# Patient Record
Sex: FEMALE | Race: WHITE | NOT HISPANIC OR LATINO | Employment: FULL TIME | URBAN - METROPOLITAN AREA
[De-identification: names, ages, dates, MRNs, and addresses within clinical notes are randomized per-mention and may not be internally consistent; named-entity substitution may affect disease eponyms.]

---

## 2017-01-04 ENCOUNTER — TRANSCRIBE ORDERS (OUTPATIENT)
Dept: ADMINISTRATIVE | Facility: HOSPITAL | Age: 22
End: 2017-01-04

## 2017-01-04 DIAGNOSIS — G47.00 INSOMNIA WITH SLEEP APNEA, UNSPECIFIED: Primary | ICD-10-CM

## 2017-01-04 DIAGNOSIS — G47.30 INSOMNIA WITH SLEEP APNEA, UNSPECIFIED: Primary | ICD-10-CM

## 2017-02-14 ENCOUNTER — ALLSCRIPTS OFFICE VISIT (OUTPATIENT)
Dept: OTHER | Facility: OTHER | Age: 22
End: 2017-02-14

## 2017-02-17 LAB
HERPES SIMPLEX TYPING (HISTORICAL): DETECTED
HERPES SIMPLEX TYPING (HISTORICAL): NOT DETECTED

## 2017-02-24 ENCOUNTER — ALLSCRIPTS OFFICE VISIT (OUTPATIENT)
Dept: OTHER | Facility: OTHER | Age: 22
End: 2017-02-24

## 2017-04-28 ENCOUNTER — APPOINTMENT (OUTPATIENT)
Dept: LAB | Age: 22
End: 2017-04-28
Attending: PREVENTIVE MEDICINE
Payer: COMMERCIAL

## 2017-04-28 ENCOUNTER — TRANSCRIBE ORDERS (OUTPATIENT)
Dept: ADMINISTRATIVE | Age: 22
End: 2017-04-28

## 2017-04-28 DIAGNOSIS — Z02.1 PRE-EMPLOYMENT HEALTH SCREENING EXAMINATION: Primary | ICD-10-CM

## 2017-04-28 DIAGNOSIS — Z02.1 PRE-EMPLOYMENT HEALTH SCREENING EXAMINATION: ICD-10-CM

## 2017-04-28 PROCEDURE — 86787 VARICELLA-ZOSTER ANTIBODY: CPT

## 2017-04-28 PROCEDURE — 86765 RUBEOLA ANTIBODY: CPT

## 2017-04-28 PROCEDURE — 36415 COLL VENOUS BLD VENIPUNCTURE: CPT

## 2017-04-28 PROCEDURE — 86480 TB TEST CELL IMMUN MEASURE: CPT

## 2017-04-28 PROCEDURE — 86762 RUBELLA ANTIBODY: CPT

## 2017-04-28 PROCEDURE — 86735 MUMPS ANTIBODY: CPT

## 2017-04-29 LAB — RUBV IGG SERPL IA-ACNC: 163.1 IU/ML

## 2017-04-30 LAB
ANNOTATION COMMENT IMP: NORMAL
GAMMA INTERFERON BACKGROUND BLD IA-ACNC: 0.03 IU/ML
M TB IFN-G BLD-IMP: NEGATIVE
M TB IFN-G CD4+ BCKGRND COR BLD-ACNC: <0.01 IU/ML
M TB IFN-G CD4+ T-CELLS BLD-ACNC: 0.02 IU/ML
MITOGEN IGNF BLD-ACNC: 8.6 IU/ML
QUANTIFERON-TB GOLD IN TUBE: NORMAL
SERVICE CMNT-IMP: NORMAL

## 2017-05-02 LAB
MEV IGG SER QL: NORMAL
MUV IGG SER QL: NORMAL
VZV IGG SER IA-ACNC: NORMAL

## 2017-05-03 ENCOUNTER — APPOINTMENT (EMERGENCY)
Dept: RADIOLOGY | Facility: HOSPITAL | Age: 22
End: 2017-05-03
Payer: COMMERCIAL

## 2017-05-03 ENCOUNTER — HOSPITAL ENCOUNTER (EMERGENCY)
Facility: HOSPITAL | Age: 22
Discharge: HOME | End: 2017-05-03
Attending: EMERGENCY MEDICINE | Admitting: EMERGENCY MEDICINE
Payer: COMMERCIAL

## 2017-05-03 VITALS
TEMPERATURE: 97.7 F | WEIGHT: 220 LBS | HEART RATE: 97 BPM | BODY MASS INDEX: 33.34 KG/M2 | HEIGHT: 68 IN | DIASTOLIC BLOOD PRESSURE: 99 MMHG | OXYGEN SATURATION: 98 % | SYSTOLIC BLOOD PRESSURE: 150 MMHG | RESPIRATION RATE: 16 BRPM

## 2017-05-03 DIAGNOSIS — S80.01XA CONTUSION OF RIGHT KNEE, INITIAL ENCOUNTER: ICD-10-CM

## 2017-05-03 DIAGNOSIS — S80.02XA CONTUSION OF LEFT KNEE, INITIAL ENCOUNTER: ICD-10-CM

## 2017-05-03 DIAGNOSIS — S09.90XA CLOSED HEAD INJURY WITHOUT CONCUSSION, INITIAL ENCOUNTER: Primary | ICD-10-CM
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
## 2017-05-03 DIAGNOSIS — S16.1XXA CERVICAL STRAIN, INITIAL ENCOUNTER: ICD-10-CM

## 2017-05-03 DIAGNOSIS — T07.XXXA ABRASIONS OF MULTIPLE SITES: ICD-10-CM

## 2017-05-03 PROCEDURE — 72040 X-RAY EXAM NECK SPINE 2-3 VW: CPT

## 2017-05-03 PROCEDURE — 73564 X-RAY EXAM KNEE 4 OR MORE: CPT

## 2017-05-03 PROCEDURE — 96372 THER/PROPH/DIAG INJ SC/IM: CPT

## 2017-05-03 PROCEDURE — 99284 EMERGENCY DEPT VISIT MOD MDM: CPT

## 2017-05-03 RX ORDER — FLUOXETINE HYDROCHLORIDE 20 MG/1
20 CAPSULE ORAL DAILY
COMMUNITY
End: 2018-01-24

## 2017-05-03 RX ORDER — KETOROLAC TROMETHAMINE 30 MG/ML
30 INJECTION, SOLUTION INTRAMUSCULAR; INTRAVENOUS ONCE
Status: COMPLETED | OUTPATIENT
Start: 2017-05-03 | End: 2017-05-03

## 2017-05-03 RX ORDER — BUPROPION HYDROCHLORIDE 150 MG/1
TABLET, EXTENDED RELEASE ORAL
COMMUNITY
End: 2018-01-16

## 2017-05-03 RX ORDER — DIAZEPAM 5 MG/1
5 TABLET ORAL EVERY 8 HOURS PRN
Qty: 15 TABLET | Refills: 0 | Status: SHIPPED | OUTPATIENT
Start: 2017-05-03 | End: 2018-01-16

## 2017-05-03 RX ADMIN — KETOROLAC TROMETHAMINE 30 MG: 30 INJECTION, SOLUTION INTRAMUSCULAR at 14:41

## 2017-07-05 ENCOUNTER — GENERIC CONVERSION - ENCOUNTER (OUTPATIENT)
Dept: OTHER | Facility: OTHER | Age: 22
End: 2017-07-05

## 2017-07-18 ENCOUNTER — TRANSCRIBE ORDERS (OUTPATIENT)
Dept: ADMINISTRATIVE | Facility: HOSPITAL | Age: 22
End: 2017-07-18

## 2017-07-18 DIAGNOSIS — R10.11 RUQ PAIN: Primary | ICD-10-CM

## 2017-07-22 ENCOUNTER — HOSPITAL ENCOUNTER (OUTPATIENT)
Dept: ULTRASOUND IMAGING | Facility: HOSPITAL | Age: 22
Discharge: HOME | End: 2017-07-22
Payer: COMMERCIAL

## 2017-07-22 DIAGNOSIS — R10.11 RUQ PAIN: ICD-10-CM

## 2017-07-22 PROCEDURE — 76705 ECHO EXAM OF ABDOMEN: CPT

## 2017-07-26 ENCOUNTER — TRANSCRIBE ORDERS (OUTPATIENT)
Dept: ADMINISTRATIVE | Facility: HOSPITAL | Age: 22
End: 2017-07-26

## 2017-07-26 DIAGNOSIS — R10.11 RUQ PAIN: Primary | ICD-10-CM

## 2017-08-28 ENCOUNTER — APPOINTMENT (OUTPATIENT)
Dept: LAB | Facility: CLINIC | Age: 22
End: 2017-08-28
Payer: COMMERCIAL

## 2017-08-28 ENCOUNTER — HOSPITAL ENCOUNTER (OUTPATIENT)
Dept: NUCLEAR MEDICINE | Facility: HOSPITAL | Age: 22
Discharge: HOME | End: 2017-08-28
Payer: COMMERCIAL

## 2017-08-28 ENCOUNTER — TRANSCRIBE ORDERS (OUTPATIENT)
Dept: LAB | Facility: CLINIC | Age: 22
End: 2017-08-28

## 2017-08-28 DIAGNOSIS — Z00.8 HEALTH EXAMINATION IN POPULATION SURVEY: Primary | ICD-10-CM

## 2017-08-28 DIAGNOSIS — Z00.8 HEALTH EXAMINATION IN POPULATION SURVEY: ICD-10-CM

## 2017-08-28 DIAGNOSIS — R10.11 RUQ PAIN: ICD-10-CM

## 2017-08-28 DIAGNOSIS — R16.0 HEPATOMEGALY: Primary | ICD-10-CM

## 2017-08-28 DIAGNOSIS — R16.0 HEPATOMEGALY: ICD-10-CM

## 2017-08-28 LAB
ALBUMIN SERPL BCP-MCNC: 3.3 G/DL (ref 3.5–5)
ALP SERPL-CCNC: 104 U/L (ref 46–116)
ALT SERPL W P-5'-P-CCNC: 27 U/L (ref 12–78)
ANION GAP SERPL CALCULATED.3IONS-SCNC: 8 MMOL/L (ref 4–13)
AST SERPL W P-5'-P-CCNC: 15 U/L (ref 5–45)
BASOPHILS # BLD AUTO: 0.02 THOUSANDS/ΜL (ref 0–0.1)
BASOPHILS NFR BLD AUTO: 0 % (ref 0–1)
BILIRUB SERPL-MCNC: 0.23 MG/DL (ref 0.2–1)
BUN SERPL-MCNC: 11 MG/DL (ref 5–25)
CALCIUM SERPL-MCNC: 8.7 MG/DL (ref 8.3–10.1)
CHLORIDE SERPL-SCNC: 109 MMOL/L (ref 100–108)
CHOLEST SERPL-MCNC: 167 MG/DL (ref 50–200)
CO2 SERPL-SCNC: 22 MMOL/L (ref 21–32)
CREAT SERPL-MCNC: 0.48 MG/DL (ref 0.6–1.3)
EOSINOPHIL # BLD AUTO: 0.12 THOUSAND/ΜL (ref 0–0.61)
EOSINOPHIL NFR BLD AUTO: 1 % (ref 0–6)
ERYTHROCYTE [DISTWIDTH] IN BLOOD BY AUTOMATED COUNT: 12.2 % (ref 11.6–15.1)
EST. AVERAGE GLUCOSE BLD GHB EST-MCNC: 94 MG/DL
FERRITIN SERPL-MCNC: 94 NG/ML (ref 8–388)
GFR SERPL CREATININE-BSD FRML MDRD: 141 ML/MIN/1.73SQ M
GLUCOSE P FAST SERPL-MCNC: 73 MG/DL (ref 65–99)
HBA1C MFR BLD: 4.9 % (ref 4.2–6.3)
HCT VFR BLD AUTO: 40.7 % (ref 34.8–46.1)
HDLC SERPL-MCNC: 60 MG/DL (ref 40–60)
HGB BLD-MCNC: 14 G/DL (ref 11.5–15.4)
IGA SERPL-MCNC: 162 MG/DL (ref 70–400)
INR PPP: 0.98 (ref 0.86–1.16)
IRON SATN MFR SERPL: 33 %
IRON SERPL-MCNC: 101 UG/DL (ref 50–170)
LDLC SERPL CALC-MCNC: 85 MG/DL (ref 0–100)
LYMPHOCYTES # BLD AUTO: 3.77 THOUSANDS/ΜL (ref 0.6–4.47)
LYMPHOCYTES NFR BLD AUTO: 38 % (ref 14–44)
MCH RBC QN AUTO: 31.7 PG (ref 26.8–34.3)
MCHC RBC AUTO-ENTMCNC: 34.4 G/DL (ref 31.4–37.4)
MCV RBC AUTO: 92 FL (ref 82–98)
MONOCYTES # BLD AUTO: 0.65 THOUSAND/ΜL (ref 0.17–1.22)
MONOCYTES NFR BLD AUTO: 7 % (ref 4–12)
NEUTROPHILS # BLD AUTO: 5.37 THOUSANDS/ΜL (ref 1.85–7.62)
NEUTS SEG NFR BLD AUTO: 54 % (ref 43–75)
NRBC BLD AUTO-RTO: 0 /100 WBCS
PLATELET # BLD AUTO: 377 THOUSANDS/UL (ref 149–390)
PMV BLD AUTO: 10 FL (ref 8.9–12.7)
POTASSIUM SERPL-SCNC: 3.8 MMOL/L (ref 3.5–5.3)
PROT SERPL-MCNC: 6.7 G/DL (ref 6.4–8.2)
PROTHROMBIN TIME: 13 SECONDS (ref 12.1–14.4)
RBC # BLD AUTO: 4.42 MILLION/UL (ref 3.81–5.12)
SODIUM SERPL-SCNC: 139 MMOL/L (ref 136–145)
TIBC SERPL-MCNC: 307 UG/DL (ref 250–450)
TRIGL SERPL-MCNC: 110 MG/DL
WBC # BLD AUTO: 9.94 THOUSAND/UL (ref 4.31–10.16)

## 2017-08-28 PROCEDURE — 86038 ANTINUCLEAR ANTIBODIES: CPT

## 2017-08-28 PROCEDURE — 85610 PROTHROMBIN TIME: CPT

## 2017-08-28 PROCEDURE — 83540 ASSAY OF IRON: CPT

## 2017-08-28 PROCEDURE — 87350 HEPATITIS BE AG IA: CPT

## 2017-08-28 PROCEDURE — 86803 HEPATITIS C AB TEST: CPT

## 2017-08-28 PROCEDURE — 83550 IRON BINDING TEST: CPT

## 2017-08-28 PROCEDURE — 83520 IMMUNOASSAY QUANT NOS NONAB: CPT

## 2017-08-28 PROCEDURE — 82103 ALPHA-1-ANTITRYPSIN TOTAL: CPT

## 2017-08-28 PROCEDURE — 82784 ASSAY IGA/IGD/IGG/IGM EACH: CPT

## 2017-08-28 PROCEDURE — 85025 COMPLETE CBC W/AUTO DIFF WBC: CPT

## 2017-08-28 PROCEDURE — 36415 COLL VENOUS BLD VENIPUNCTURE: CPT

## 2017-08-28 PROCEDURE — 82390 ASSAY OF CERULOPLASMIN: CPT

## 2017-08-28 PROCEDURE — 86235 NUCLEAR ANTIGEN ANTIBODY: CPT

## 2017-08-28 PROCEDURE — 80061 LIPID PANEL: CPT

## 2017-08-28 PROCEDURE — 83036 HEMOGLOBIN GLYCOSYLATED A1C: CPT

## 2017-08-28 PROCEDURE — 80053 COMPREHEN METABOLIC PANEL: CPT

## 2017-08-28 PROCEDURE — 86706 HEP B SURFACE ANTIBODY: CPT

## 2017-08-28 PROCEDURE — 86708 HEPATITIS A ANTIBODY: CPT

## 2017-08-28 PROCEDURE — A9537 TC99M MEBROFENIN: HCPCS

## 2017-08-28 PROCEDURE — 78227 HEPATOBIL SYST IMAGE W/DRUG: CPT

## 2017-08-28 PROCEDURE — 86256 FLUORESCENT ANTIBODY TITER: CPT

## 2017-08-28 PROCEDURE — 86704 HEP B CORE ANTIBODY TOTAL: CPT

## 2017-08-28 PROCEDURE — 82728 ASSAY OF FERRITIN: CPT

## 2017-08-28 RX ADMIN — SINCALIDE 2 MCG: 5 INJECTION, POWDER, LYOPHILIZED, FOR SOLUTION INTRAVENOUS at 09:05

## 2017-08-29 LAB
A1AT SERPL-MCNC: 152 MG/DL (ref 90–200)
ACTIN IGG SERPL-ACNC: 7 UNITS (ref 0–19)
CERULOPLASMIN SERPL-MCNC: 45.4 MG/DL (ref 19–39)
HAV AB SER QL IA: NORMAL
HBV CORE AB SER QL: NORMAL
HBV E AG SERPL QL IA: NEGATIVE
HBV SURFACE AB SER-ACNC: <3.1 MIU/ML
HCV AB SER QL: NORMAL
MITOCHONDRIA M2 IGG SER-ACNC: 1.3 UNITS (ref 0–20)
TSH RECEP AB SER-ACNC: <0.5 IU/L (ref 0–1.75)

## 2017-08-30 LAB — RYE IGE QN: NEGATIVE

## 2018-01-10 NOTE — PROGRESS NOTES
Chief Complaint  pt was here today to get flu tdap, third hpv and ppd also today temp was 97 6, pt will come in this wed feb 17/2106 to have ppd shot read then      Active Problems    1  Asthma (493 90) (J45 909)   2  Birth control counseling (V25 09) (Z30 9)   3  Chlamydia trachomatis infection of lower genitourinary site (099 53) (A56 09)   4  Chronic fatigue (780 79) (R53 82)   5  Cough (786 2) (R05)   6  Exposure to STD (V01 6) (Z20 2)   7  Headache (784 0) (R51)   8  Hemangioma (228 00) (D18 00)   9  Denied: History of self breast exam   10  Need for HPV vaccine (V04 89) (Z23)   11  Need for immunization against influenza (V04 81) (Z23)   12  Need for Tdap vaccination (V06 1) (Z23)   13  Need for tuberculosis vaccination (V03 2) (Z23)   14  PPD screening test (V74 1) (Z11 1)   15  Scoliosis (737 30) (M41 9)   16  Sinusitis (473 9) (J32 9)   17  Visit for routine gyn exam (V72 31) (Z01 419)   18  Vitamin D deficiency (268 9) (E55 9)   19  Weight gain (783 1) (R63 5)    Current Meds   1  Claritin 10 MG Oral Tablet; Therapy: (Recorded:17Lai4754) to Recorded   2  Junel FE 1/20 1-20 MG-MCG Oral Tablet; TAKE ONE TABLET BY MOUTH EVERY DAY   NO PLACEBOS; Therapy: 93YWM5514 to (Evaluate:61Odt5019)  Requested for: 11QOQ7906; Last   Rx:22Jan2016 Ordered   3  Norethin Ace-Eth Estrad-FE 1-20 MG-MCG Oral Tablet; TAKE ONE TABLET BY MOUTH   EVERY DAY NO PLACEBOS; Therapy: 61CQM5056 to (Evaluate:07Myj4690)  Requested for: 99CDR7665; Last   Rx:65Zhz3341 Ordered    Allergies    1  No Known Drug Allergies    Plan  Health Maintenance, Need for HPV vaccine    · HPV (Gardasil)  Need for immunization against influenza    · Fluzone Preservative Free 0 5 ML Intramuscular Suspension Prefilled  Syringe  Need for Tdap vaccination    · Adacel 5-2-15 5 LF-MCG/0 5 Intramuscular Suspension  PPD screening test    · Tubersol 5 UNIT/0 1ML Intradermal Solution    Signatures   Electronically signed by :  AGUILA De La Cruz ; Feb 16 2016 10: 27AM EST                       (Author)

## 2018-01-11 NOTE — RESULT NOTES
Message   Please call patient or her mother, echocardiogram is normal     Verified Results  ECHO COMPLETE WITH CONTRAST IF INDICATED [id number] 11:39AM Terrall Persons     Test Name Result Flag Reference   ECHO COMPLETE WITH CONTRAST IF INDICATED (Report)     [address]   [place], [address]   [phone]     Transthoracic Echocardiogram   2D, M-mode, Doppler, and Color Doppler     Study date: 2016     Patient: Kathryn Saldivar   MR number: [id number]   Account number: [de-identified]   : 1995   Age: 22 years   Gender: Female   Status: Outpatient   Location: Echo lab   Height: 67 in   Weight: 219 6 lb   BP: 120/ 82 mmHg     Indications: Syncope  Diagnoses: R55  - Syncope and collapse     Sonographer: Eliezer Benavides [facility] AE-PE   Primary Physician: Marley Presley MD   Referring Physician: Marley Presley MD   Group: [facility]   Interpreting Physician: Ronn Martínez MD     SUMMARY     LEFT VENTRICLE:   Systolic function was normal  Ejection fraction was estimated to be 65 %  There were no regional wall motion abnormalities  MITRAL VALVE:   There was trace regurgitation  TRICUSPID VALVE:   There was trace regurgitation  HISTORY: PRIOR HISTORY: Patient has no history of cardiovascular disease  PROCEDURE: The procedure was performed in the echo lab  This was a routine   study  The transthoracic approach was used  The study included complete 2D   imaging, M-mode, complete spectral Doppler, and color Doppler  The heart rate   was 85 bpm, at the start of the study  Image quality was adequate  LEFT VENTRICLE: Size was normal  Systolic function was normal  Ejection   fraction was estimated to be 65 %  There were no regional wall motion   abnormalities  Wall thickness was normal  No evidence of apical thrombus     DOPPLER: Left ventricular diastolic function parameters were normal      RIGHT VENTRICLE: The size was normal  Systolic function was normal  Wall   thickness was normal      LEFT ATRIUM: Size was normal      RIGHT ATRIUM: Size was normal      MITRAL VALVE: Valve structure was normal  There was normal leaflet separation  DOPPLER: The transmitral velocity was within the normal range  There was no   evidence for stenosis  There was trace regurgitation  AORTIC VALVE: The valve was trileaflet  Leaflets exhibited normal thickness and   normal cuspal separation  DOPPLER: Transaortic velocity was within the normal   range  There was no evidence for stenosis  There was no significant   regurgitation  TRICUSPID VALVE: The valve structure was normal  There was normal leaflet   separation  DOPPLER: The transtricuspid velocity was within the normal range  There was no evidence for stenosis  There was trace regurgitation  The   tricuspid jet envelope definition was inadequate for estimation of RV systolic   pressure  There are no indirect findings (abnormal RV volume or geometry,   altered pulmonary flow velocity profile, or leftward septal displacement) which   would suggest moderate or severe pulmonary hypertension  PULMONIC VALVE: Leaflets exhibited normal thickness, no calcification, and   normal cuspal separation  DOPPLER: The transpulmonic velocity was within the   normal range  There was no significant regurgitation  PERICARDIUM: There was no pericardial effusion  The pericardium was normal in   appearance  AORTA: The root exhibited normal size  SYSTEMIC VEINS: IVC: The inferior vena cava was normal in size  PULMONARY VEINS: DOPPLER: Doppler flow pattern was normal in the pulmonary   vein(s)       SYSTEM MEASUREMENT TABLES     2D   %FS: 30 93 %   Ao Diam: 3 56 cm   EDV(Teich): 132 73 ml   EF(Teich): 58 17 %   ESV(Cube): 47 83 ml   ESV(Teich): 55 52 ml   IVSd: 0 85 cm   LA Area: 13 07 cm2   LA Diam: 3 86 cm   LVEDV MOD A4C: 104 24 ml   LVEF MOD A4C: 64 86 %   LVESV MOD A4C: 36 63 ml   LVIDd: 5 26 cm   LVIDs: 3 63 cm   LVLd A4C: 6 77 cm   LVLs A4C: 5 25 cm   LVPWd: 0 85 cm   RA Area: 9 26 cm2   RV Diam : 2 86 cm   SI(Cube): 46 13 ml/m2   SI(Teich): 36 59 ml/m2   SV MOD A4C: 67 61 ml   SV(Cube): 97 34 ml   SV(Teich): 77 21 ml     MM   TAPSE: 2 3 cm     PW   E': 0 07 m/s   E/E': 9 58   MV A Blaze: 0 59 m/s   MV Dec [place]: 4 59 m/s2   MV DecT: 153 62 ms   MV E Blaze: 0 7 m/s   MV E/A Ratio: 1 2     [employer] Accredited Echocardiography Laboratory     Prepared and electronically signed by     Emily Milton MD   Signed [id number] 13:57:44       Signatures   Electronically signed by :  AGUILA Goins ; Nov 30 2016  2:07PM EST                       (Author)

## 2018-01-11 NOTE — MISCELLANEOUS
Message   Recorded as Task   Date: 07/05/2017 03:24 PM, Created By: Timothy Dacosta   Task Name: Med Renewal Request   Assigned To: Karen Zhong   Regarding Patient: Andreina Grewal, Status: Active   Dalton Sands - 05 Jul 2017 3:24 PM     TASK CREATED  Caller: Self; Renew Medication; [phone] (Home)  Pt request refill of valtrex   Darell Marroquin - 05 Jul 2017 3:29 PM     TASK EDITED  rx to [facility]l        Active Problems    1  Asthma (493 90) (J45 909)   2  Chronic fatigue (780 79) (R53 82)   3  Genital herpes (054 10) (A60 00)   4  Hemangioma (228 00) (D18 00)   5  Scoliosis (737 30) (M41 9)   6  Sleep apnea (780 57) (G47 30)   7  Vitamin D deficiency (268 9) (E55 9)   8  Weight gain (783 1) (R63 5)    Current Meds   1  BuPROPion HCl ER (SR) 150 MG Oral Tablet Extended Release 12 Hour; Take 1 tablet   twice daily Recorded   2  Claritin 10 MG Oral Tablet; Therapy: (Recorded:23Uab1612) to Recorded   3  Dominic Dirk 1/35 1-35 MG-MCG Oral Tablet; Take 1 tablet daily Recorded   4  ValACYclovir HCl - 1 GM Oral Tablet; Take 1 tablet every 12 hours; Therapy: 06FKE5255 to (Evaluate:29Bfl5566)  Requested for: 25XLT0294; Last   Rx:10Xpl6451 Ordered   5  ValACYclovir HCl - 500 MG Oral Tablet (Valtrex); Take 1 tablet daily; Therapy: 59POA9606 to (Last Rx:99Kxu2355)  Requested for: 97Waq0980 Ordered   6  Vitamin D (Ergocalciferol) 76318 UNIT Oral Capsule; TAKE 1 CAPSULE WEEKLY   Recorded    Allergies    1   No Known Drug Allergies    Plan  Genital herpes    · From  ValACYclovir HCl - 1 GM Oral Tablet Take 1 tablet every 12 hours To  ValACYclovir HCl - 1 GM Oral Tablet (Valtrex) TAKE 1 TABLET DAILY    Signatures   Electronically signed by : Niecy Fu, ; Jul 5 2017  3:29PM EST                       (Author)

## 2018-01-12 VITALS — DIASTOLIC BLOOD PRESSURE: 78 MMHG | BODY MASS INDEX: 33.6 KG/M2 | WEIGHT: 221 LBS | SYSTOLIC BLOOD PRESSURE: 122 MMHG

## 2018-01-13 VITALS — DIASTOLIC BLOOD PRESSURE: 74 MMHG | BODY MASS INDEX: 35.08 KG/M2 | SYSTOLIC BLOOD PRESSURE: 116 MMHG | WEIGHT: 224 LBS

## 2018-01-16 ENCOUNTER — APPOINTMENT (EMERGENCY)
Dept: RADIOLOGY | Facility: HOSPITAL | Age: 23
DRG: 845 | End: 2018-01-16
Payer: COMMERCIAL

## 2018-01-16 ENCOUNTER — HOSPITAL ENCOUNTER (INPATIENT)
Facility: HOSPITAL | Age: 23
LOS: 1 days | Discharge: HOME | DRG: 845 | End: 2018-01-17
Attending: EMERGENCY MEDICINE | Admitting: INTERNAL MEDICINE
Payer: COMMERCIAL

## 2018-01-16 DIAGNOSIS — R51.9 HEADACHE: ICD-10-CM

## 2018-01-16 DIAGNOSIS — R55 NEAR SYNCOPE: ICD-10-CM

## 2018-01-16 DIAGNOSIS — R22.1 NECK MASS: Primary | ICD-10-CM

## 2018-01-16 DIAGNOSIS — H55.00 NYSTAGMUS: ICD-10-CM

## 2018-01-16 LAB
ALBUMIN SERPL BCP-MCNC: 3.4 G/DL (ref 3.5–5)
ALP SERPL-CCNC: 121 U/L (ref 46–116)
ALT SERPL W P-5'-P-CCNC: 27 U/L (ref 12–78)
ANION GAP SERPL CALCULATED.3IONS-SCNC: 6 MMOL/L (ref 4–13)
AST SERPL W P-5'-P-CCNC: 9 U/L (ref 5–45)
BASOPHILS # BLD AUTO: 0.02 THOUSANDS/ΜL (ref 0–0.1)
BASOPHILS NFR BLD AUTO: 0 % (ref 0–1)
BILIRUB SERPL-MCNC: 0.26 MG/DL (ref 0.2–1)
BILIRUB UR QL STRIP: NEGATIVE
BUN SERPL-MCNC: 9 MG/DL (ref 5–25)
CALCIUM SERPL-MCNC: 8.6 MG/DL (ref 8.3–10.1)
CHLORIDE SERPL-SCNC: 108 MMOL/L (ref 100–108)
CLARITY UR: CLEAR
CO2 SERPL-SCNC: 28 MMOL/L (ref 21–32)
COLOR UR: YELLOW
COLOR, POC: NORMAL
CREAT SERPL-MCNC: 0.51 MG/DL (ref 0.6–1.3)
EOSINOPHIL # BLD AUTO: 0.05 THOUSAND/ΜL (ref 0–0.61)
EOSINOPHIL NFR BLD AUTO: 1 % (ref 0–6)
ERYTHROCYTE [DISTWIDTH] IN BLOOD BY AUTOMATED COUNT: 12.6 % (ref 11.6–15.1)
EXT PREG TEST URINE: NEGATIVE
GFR SERPL CREATININE-BSD FRML MDRD: 138 ML/MIN/1.73SQ M
GLUCOSE SERPL-MCNC: 75 MG/DL (ref 65–140)
GLUCOSE SERPL-MCNC: 84 MG/DL (ref 65–140)
GLUCOSE UR STRIP-MCNC: NEGATIVE MG/DL
HCT VFR BLD AUTO: 38.2 % (ref 34.8–46.1)
HGB BLD-MCNC: 13.2 G/DL (ref 11.5–15.4)
HGB UR QL STRIP.AUTO: NEGATIVE
KETONES UR STRIP-MCNC: NEGATIVE MG/DL
LEUKOCYTE ESTERASE UR QL STRIP: NEGATIVE
LYMPHOCYTES # BLD AUTO: 3.95 THOUSANDS/ΜL (ref 0.6–4.47)
LYMPHOCYTES NFR BLD AUTO: 39 % (ref 14–44)
MCH RBC QN AUTO: 31.4 PG (ref 26.8–34.3)
MCHC RBC AUTO-ENTMCNC: 34.6 G/DL (ref 31.4–37.4)
MCV RBC AUTO: 91 FL (ref 82–98)
MONOCYTES # BLD AUTO: 0.72 THOUSAND/ΜL (ref 0.17–1.22)
MONOCYTES NFR BLD AUTO: 7 % (ref 4–12)
NEUTROPHILS # BLD AUTO: 5.44 THOUSANDS/ΜL (ref 1.85–7.62)
NEUTS SEG NFR BLD AUTO: 53 % (ref 43–75)
NITRITE UR QL STRIP: NEGATIVE
NRBC BLD AUTO-RTO: 0 /100 WBCS
PH UR STRIP.AUTO: 6 [PH] (ref 4.5–8)
PLATELET # BLD AUTO: 338 THOUSANDS/UL (ref 149–390)
PMV BLD AUTO: 9.3 FL (ref 8.9–12.7)
POTASSIUM SERPL-SCNC: 3.2 MMOL/L (ref 3.5–5.3)
PROT SERPL-MCNC: 7 G/DL (ref 6.4–8.2)
PROT UR STRIP-MCNC: NEGATIVE MG/DL
RBC # BLD AUTO: 4.21 MILLION/UL (ref 3.81–5.12)
SODIUM SERPL-SCNC: 142 MMOL/L (ref 136–145)
SP GR UR STRIP.AUTO: 1.02 (ref 1–1.03)
UROBILINOGEN UR QL STRIP.AUTO: 0.2 E.U./DL
WBC # BLD AUTO: 10.2 THOUSAND/UL (ref 4.31–10.16)

## 2018-01-16 PROCEDURE — 80053 COMPREHEN METABOLIC PANEL: CPT | Performed by: EMERGENCY MEDICINE

## 2018-01-16 PROCEDURE — 82948 REAGENT STRIP/BLOOD GLUCOSE: CPT

## 2018-01-16 PROCEDURE — 81025 URINE PREGNANCY TEST: CPT | Performed by: EMERGENCY MEDICINE

## 2018-01-16 PROCEDURE — 96374 THER/PROPH/DIAG INJ IV PUSH: CPT

## 2018-01-16 PROCEDURE — 70496 CT ANGIOGRAPHY HEAD: CPT

## 2018-01-16 PROCEDURE — 85025 COMPLETE CBC W/AUTO DIFF WBC: CPT | Performed by: EMERGENCY MEDICINE

## 2018-01-16 PROCEDURE — 81002 URINALYSIS NONAUTO W/O SCOPE: CPT | Performed by: EMERGENCY MEDICINE

## 2018-01-16 PROCEDURE — 93005 ELECTROCARDIOGRAM TRACING: CPT

## 2018-01-16 PROCEDURE — 36415 COLL VENOUS BLD VENIPUNCTURE: CPT | Performed by: EMERGENCY MEDICINE

## 2018-01-16 PROCEDURE — 81003 URINALYSIS AUTO W/O SCOPE: CPT

## 2018-01-16 PROCEDURE — 96361 HYDRATE IV INFUSION ADD-ON: CPT

## 2018-01-16 PROCEDURE — 70498 CT ANGIOGRAPHY NECK: CPT

## 2018-01-16 PROCEDURE — 93005 ELECTROCARDIOGRAM TRACING: CPT | Performed by: EMERGENCY MEDICINE

## 2018-01-16 RX ORDER — ACETAMINOPHEN 325 MG/1
650 TABLET ORAL ONCE
Status: COMPLETED | OUTPATIENT
Start: 2018-01-16 | End: 2018-01-16

## 2018-01-16 RX ORDER — POTASSIUM CHLORIDE 20 MEQ/1
40 TABLET, EXTENDED RELEASE ORAL ONCE
Status: COMPLETED | OUTPATIENT
Start: 2018-01-16 | End: 2018-01-16

## 2018-01-16 RX ORDER — METOCLOPRAMIDE HYDROCHLORIDE 5 MG/ML
10 INJECTION INTRAMUSCULAR; INTRAVENOUS ONCE
Status: COMPLETED | OUTPATIENT
Start: 2018-01-16 | End: 2018-01-16

## 2018-01-16 RX ADMIN — SODIUM CHLORIDE 1000 ML: 0.9 INJECTION, SOLUTION INTRAVENOUS at 20:52

## 2018-01-16 RX ADMIN — ACETAMINOPHEN 650 MG: 325 TABLET, FILM COATED ORAL at 20:53

## 2018-01-16 RX ADMIN — IOHEXOL 85 ML: 350 INJECTION, SOLUTION INTRAVENOUS at 21:59

## 2018-01-16 RX ADMIN — METOCLOPRAMIDE 10 MG: 5 INJECTION, SOLUTION INTRAMUSCULAR; INTRAVENOUS at 20:53

## 2018-01-16 RX ADMIN — POTASSIUM CHLORIDE 40 MEQ: 1500 TABLET, EXTENDED RELEASE ORAL at 22:22

## 2018-01-16 NOTE — RESULT NOTES
Message   Please call patient  Urine drug screen is negative  We can provide copy for her school if necessary     Verified Results  [phone] [id number] 05:45PM Debra Evans   Performed at:  [address] [place] [address] [place] [address]  [id number]  : Kush De La Fuente MD, Phone:  [phone]     Test Name Result Flag Reference   AMPHETAMINE SCREEN URINE Negative ng/mL  Uofjys=2098   Amphetamine test includes Amphetamine and Methamphetamine  BARBITURATE SCREEN URINE Negative ng/mL  Hgznyu=903   BENZODIAZEPINE SCREEN, URINE Negative ng/mL  Gizfmg=297   CANNABINOID SCREEN URINE Negative ng/mL  Cutoff=50   COCAINE(METAB  )SCREEN, URINE Negative ng/mL  Fyxsav=889   METHADONE SCREEN, URINE Negative ng/mL  Ksyodq=004   OPIATE SCREEN URINE Negative ng/mL  Ptqewn=088   Opiate test includes Codeine and Morphine only  PHENCYCLIDINE (PCP), QUAL, UR Negative ng/mL  Cutoff=25   PROPOXYPHENE, SCREEN Negative ng/mL  Kjjpak=006       Signatures   Electronically signed by :  AGUILA Mullins ; Mar  6 2016  4:38PM EST                       (Author)

## 2018-01-16 NOTE — PROGRESS NOTES
Chief Complaint  Pt is here for a PPD  Active Problems    1  Asthma (493 90) (J45 909)   2  Birth control counseling (V25 09) (Z30 9)   3  Chlamydia trachomatis infection of lower genitourinary site (099 53) (A56 09)   4  Chronic fatigue (780 79) (R53 82)   5  Cough (786 2) (R05)   6  Exposure to STD (V01 6) (Z20 2)   7  Headache (784 0) (R51)   8  Hemangioma (228 00) (D18 00)   9  Denied: History of self breast exam   10  Scoliosis (737 30) (M41 9)   11  Sinusitis (473 9) (J32 9)   12  Visit for routine gyn exam (V72 31) (Z01 419)   13  Vitamin D deficiency (268 9) (E55 9)   14  Weight gain (783 1) (R63 5)    Current Meds   1  Claritin 10 MG Oral Tablet; Therapy: (Recorded:02Fsv0244) to Recorded   2  Junel FE 1/20 1-20 MG-MCG Oral Tablet; TAKE ONE TABLET BY MOUTH EVERY DAY   NO PLACEBOS; Therapy: 85HTN6604 to (Evaluate:17Lvn6149)  Requested for: 49OOG5924; Last   Rx:22Jan2016 Ordered   3  Norethin Ace-Eth Estrad-FE 1-20 MG-MCG Oral Tablet; TAKE ONE TABLET BY MOUTH   EVERY DAY NO PLACEBOS; Therapy: 40UBN5781 to (Evaluate:06Eqz9871)  Requested for: 28HBF1484; Last   Rx:01Ped2338 Ordered    Allergies    1  No Known Drug Allergies    Plan  Need for tuberculosis vaccination    · PPD    Signatures   Electronically signed by :  AGUILA Patricia ; Feb 5 2016  4:59PM EST                       (Author)

## 2018-01-17 VITALS
TEMPERATURE: 98.5 F | HEIGHT: 67 IN | DIASTOLIC BLOOD PRESSURE: 97 MMHG | HEART RATE: 85 BPM | SYSTOLIC BLOOD PRESSURE: 141 MMHG | OXYGEN SATURATION: 98 % | RESPIRATION RATE: 21 BRPM | BODY MASS INDEX: 39.1 KG/M2 | WEIGHT: 249.12 LBS

## 2018-01-17 PROBLEM — R22.2 SUPRACLAVICULAR MASS: Status: ACTIVE | Noted: 2018-01-17

## 2018-01-17 PROBLEM — G89.29 CHRONIC NONINTRACTABLE HEADACHE: Status: ACTIVE | Noted: 2018-01-17

## 2018-01-17 PROBLEM — R51.9 CHRONIC NONINTRACTABLE HEADACHE: Status: ACTIVE | Noted: 2018-01-17

## 2018-01-17 PROBLEM — Z86.018: Status: ACTIVE | Noted: 2018-01-17

## 2018-01-17 PROBLEM — Z98.890: Status: ACTIVE | Noted: 2018-01-17

## 2018-01-17 PROBLEM — F32.A DEPRESSION: Status: ACTIVE | Noted: 2018-01-17

## 2018-01-17 LAB
ANION GAP SERPL CALCULATED.3IONS-SCNC: 6 MMOL/L (ref 4–13)
ATRIAL RATE: 82 BPM
BUN SERPL-MCNC: 6 MG/DL (ref 5–25)
CALCIUM SERPL-MCNC: 8.4 MG/DL (ref 8.3–10.1)
CHLORIDE SERPL-SCNC: 109 MMOL/L (ref 100–108)
CO2 SERPL-SCNC: 27 MMOL/L (ref 21–32)
CREAT SERPL-MCNC: 0.44 MG/DL (ref 0.6–1.3)
GFR SERPL CREATININE-BSD FRML MDRD: 145 ML/MIN/1.73SQ M
GLUCOSE SERPL-MCNC: 90 MG/DL (ref 65–140)
MAGNESIUM SERPL-MCNC: 2.4 MG/DL (ref 1.6–2.6)
P AXIS: 33 DEGREES
PHOSPHATE SERPL-MCNC: 3.1 MG/DL (ref 2.7–4.5)
POTASSIUM SERPL-SCNC: 3.6 MMOL/L (ref 3.5–5.3)
PR INTERVAL: 192 MS
QRS AXIS: -2 DEGREES
QRSD INTERVAL: 96 MS
QT INTERVAL: 378 MS
QTC INTERVAL: 441 MS
SODIUM SERPL-SCNC: 142 MMOL/L (ref 136–145)
T WAVE AXIS: 22 DEGREES
VENTRICULAR RATE: 82 BPM

## 2018-01-17 PROCEDURE — 99285 EMERGENCY DEPT VISIT HI MDM: CPT

## 2018-01-17 PROCEDURE — 84100 ASSAY OF PHOSPHORUS: CPT | Performed by: INTERNAL MEDICINE

## 2018-01-17 PROCEDURE — 83735 ASSAY OF MAGNESIUM: CPT | Performed by: INTERNAL MEDICINE

## 2018-01-17 PROCEDURE — 36415 COLL VENOUS BLD VENIPUNCTURE: CPT | Performed by: INTERNAL MEDICINE

## 2018-01-17 PROCEDURE — 80048 BASIC METABOLIC PNL TOTAL CA: CPT | Performed by: INTERNAL MEDICINE

## 2018-01-17 RX ORDER — ONDANSETRON 2 MG/ML
4 INJECTION INTRAMUSCULAR; INTRAVENOUS EVERY 6 HOURS PRN
Status: DISCONTINUED | OUTPATIENT
Start: 2018-01-17 | End: 2018-01-17 | Stop reason: HOSPADM

## 2018-01-17 RX ORDER — MAGNESIUM HYDROXIDE/ALUMINUM HYDROXICE/SIMETHICONE 120; 1200; 1200 MG/30ML; MG/30ML; MG/30ML
30 SUSPENSION ORAL EVERY 6 HOURS PRN
Status: DISCONTINUED | OUTPATIENT
Start: 2018-01-17 | End: 2018-01-17 | Stop reason: HOSPADM

## 2018-01-17 RX ORDER — FLUOXETINE HYDROCHLORIDE 20 MG/1
20 CAPSULE ORAL DAILY
Status: DISCONTINUED | OUTPATIENT
Start: 2018-01-17 | End: 2018-01-17 | Stop reason: HOSPADM

## 2018-01-17 RX ORDER — ACETAMINOPHEN 325 MG/1
650 TABLET ORAL EVERY 6 HOURS PRN
Status: DISCONTINUED | OUTPATIENT
Start: 2018-01-17 | End: 2018-01-17 | Stop reason: HOSPADM

## 2018-01-17 RX ORDER — DOCUSATE SODIUM 100 MG/1
100 CAPSULE, LIQUID FILLED ORAL 2 TIMES DAILY PRN
Status: DISCONTINUED | OUTPATIENT
Start: 2018-01-17 | End: 2018-01-17 | Stop reason: HOSPADM

## 2018-01-17 RX ADMIN — FLUOXETINE 20 MG: 20 CAPSULE ORAL at 08:21

## 2018-01-17 NOTE — PROGRESS NOTES
01/16/18 2307   Patient Spiritual Encounters   Coping 5   Family Spiritual Encounters   Family Coping Anxiety; Fearful;Open/discussion   Family Participation in Care 5

## 2018-01-17 NOTE — CASE MANAGEMENT
Initial Clinical Review    Admission: Date/Time/Statement: 1/16/18 @ 2347     Orders Placed This Encounter   Procedures    Inpatient Admission (expected length of stay for this patient is greater than two midnights)     Standing Status:   Standing     Number of Occurrences:   1     Order Specific Question:   Admitting Physician     Answer:   Pramod Robles [[id number]]     Order Specific Question:   Level of Care     Answer:   Med Surg [16]     Order Specific Question:   Estimated length of stay     Answer:   More than 2 Midnights     Order Specific Question:   Certification     Answer:   I certify that inpatient services are medically necessary for this patient for a duration of greater than two midnights  See H&P and MD Progress Notes for additional information about the patient's course of treatment  ED: Date/Time/Mode of Arrival:   ED Arrival Information     Expected Arrival Acuity Means of Arrival Escorted By Service Admission Type    - 1/16/2018 19:30 Urgent Walk-In Family Member General Medicine Urgent    Arrival Complaint    lethargic          Chief Complaint:   Chief Complaint   Patient presents with    Dizziness     Pt states she was at work and felt really tired and dizzy  Pt states she sometimes feels like that but not to this extent  Pt took nap after work and still feels lethargic and states her "vision is not all there " Pt states it is "almost a chore to keep eyes open "       History of Illness: 25year-old female comes in for evaluation of headache; that started suddenly while at work today  States has history of posterior headaches; but this is frontal and different  She tried to take a nap; but it worsened  She is here for evaluation  She did feel near-syncope; but did not actually syncopize; no vertigo but a sensation of going to pass out  Denies chest pain; shortness of breath  She states she takes oral contraceptives and Prozac    She states 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  She is not sure if she has a unifying diagnosis for hemangiomas; but she was seen [facility] multiple times when she was younger  Denies any falls trauma; or accidents  She states her eyes feel heavy; most like they are difficult to open and she intermittently has blurry vision  She has normal gait she has normal finger to nose and she has normal rapid alternating movements  Headache not present this morning  No neck stiffness  Motor strength is 5/5 and symmetrical  Patient appears well; she has no dysarthria and she complains of no dysphagia  Symptoms are not improving with rest     The patient this morning was feeling different saying that her headache was somewhat more intense as well  The headache is characterized more to be located and frontal area  Somewhat throbbing  No photophobia  She did not lose consciousness  She did not fall or did have any ambulatory dysfunction  Patient earlier had blurry vision but currently has returned back to clear  No note of any black outs  ED Vital Signs:   ED Triage Vitals   Temperature Pulse Respirations Blood Pressure SpO2   01/16/18 1938 01/16/18 1938 01/16/18 1938 01/16/18 1938 01/16/18 1938   97 7 °F (36 5 °C) 99 16 148/92 97 %      Temp Source Heart Rate Source Patient Position - Orthostatic VS BP Location FiO2 (%)   01/17/18 0558 01/16/18 2216 01/16/18 2216 01/16/18 2216 --   Tympanic Monitor Lying Right arm       Pain Score       01/16/18 1938       Worst Possible Pain        Wt Readings from Last 1 Encounters:   01/17/18 113 kg (249 lb 1 9 oz)       Vital Signs (abnormal): WNL    Abnormal Labs:   01/16/18 2051     Sodium 136 - 145 mmol/L 142    Potassium 3 5 - 5 3 mmol/L 3 2        01/16/18 2051    WBC 4 31 - 10 16 Thousand/uL 10 20         Diagnostic Test Results: CTA Head and Neck -  No evidence of dissection, occlusion, aneurysm or significant stenosis    2  Large 4 2 x 4 5 x 5 7 cm soft tissue mass/neoplasm identified along the left medial supraclavicular region causing mass effect on the left thyroid gland and compressing the trachea  The mass displaces the common carotid artery and internal jugular vein anteriorly  The mass is anterior to the left vertebral artery  Differential includes neurogenic tumor, metastatic disease or other etiology  Mild erosive changes along the left anterolateral aspect of C6 vertebral body is identified could possibly related to the mass or degenerative change  Please follow-up with contrast enhanced neck MRI  ED Treatment:   Medication Administration from 01/16/2018 1929 to 01/17/2018 0536       Date/Time Order Dose Route Action     01/16/2018 2053 metoclopramide (REGLAN) injection 10 mg 10 mg Intravenous Given     01/16/2018 2052 sodium chloride 0 9 % bolus 1,000 mL 1,000 mL Intravenous New Bag     01/16/2018 2053 acetaminophen (TYLENOL) tablet 650 mg 650 mg Oral Given     01/16/2018 2222 potassium chloride (K-DUR,KLOR-CON) CR tablet 40 mEq 40 mEq Oral Given     01/16/2018 2159 iohexol (OMNIPAQUE) 350 MG/ML injection (MULTI-DOSE) 85 mL 85 mL Intravenous Given          Past Medical/Surgical History: Active Ambulatory Problems     Diagnosis Date Noted    No Active Ambulatory Problems     Resolved Ambulatory Problems     Diagnosis Date Noted    No Resolved Ambulatory Problems     Past Medical History:   Diagnosis Date    Asthma        Admitting Diagnosis: Neck mass [R22 1]  Nystagmus [H55 00]  Lethargic [R53 83]  Near syncope [R55]  Headache [R51]    Age/Sex: 25 y o  female    Assessment/Plan:   Supraclavicular mass   Assessment & Plan     Admission, medical-surgical floor  (Patient has no respiratory compromise  )  ENT consult to continue as there is need to do a biopsy    At this point, we will first need to know what the biopsy results will be prior to getting any other consults or workup        Chronic nonintractable headache   Assessment & Plan     Patient will continue acetaminophen for headache         History of excision of hemangioma   Assessment & Plan     Currently stable        Depression   Assessment & Plan     Would continue Zoloft             VTE Prophylaxis: Pharmacologic VTE Prophylaxis contraindicated due to May need surgical intervention and patient is of low risk  / sequential compression device   Code Status: No Order full code  POLST: There is no POLST form on file for this patient (pre-hospital)     Anticipated Length of Stay:  Patient will be admitted on an Inpatient basis with an anticipated length of stay of  greater than 2 midnights     Justification for Hospital Stay: Please see detailed plans noted above        Admission Orders:  MRI Neck - supraclavicular mass  ENT cons    Scheduled Meds:   FLUoxetine 20 mg Oral Daily     Continuous Infusions:    PRN Meds:   acetaminophen    aluminum-magnesium hydroxide-simethicone    docusate sodium    ondansetron

## 2018-01-17 NOTE — CONSULTS
Consultation - ENT   Taz Beauchamp 25 y o  female MRN: [id number]  Unit/Bed#: -01 Encounter: [id number]      Assessment/Plan     Assessment:  25 F admitted with headaches, dizziness, nystagmus - possibly Migraine related    L level IV neck mass  Plan:  Agree with MRI c AISHA neck  Would pursue further workup as an outpatient, which may involve a FNAB pending the results of the MRI    Regarding her headache and dizziness, this has resolved  She denies vertigo and further otologic sx  Given her history of migraine and vestibular problems, this is likely related to Migraine Vestibulopathy    History of Present Illness   Physician Requesting Consult: Ree Feliz MD  Reason for Consult / Principal Problem: neck mass  HPI: Taz Beauchamp is a 25y o  year old female who presents with headaches and dizziness  CTA in the ED notable for left level IV mass with tracheal compression  She has a strong history for hemangiomas as a child with involved treatment including INF and a tracheostomy  She denies dysphagia, hoarseness, pain, dyspnea  Long standing migraines  Denies vertigo, hearing loss, tinnitus, aural fullness  Her headaches and dizziness have since resolved  Consults    Review of Systems    Historical Information   Past Medical History:   Diagnosis Date    Asthma      History reviewed  No pertinent surgical history    Social History   History   Alcohol Use    Yes     Comment: occasional     History   Drug Use No     History   Smoking Status    Never Smoker   Smokeless Tobacco    Never Used     Family History: non-contributory    Meds/Allergies   all current active meds have been reviewed and current meds:   Current Facility-Administered Medications   Medication Dose Route Frequency    acetaminophen (TYLENOL) tablet 650 mg  650 mg Oral Q6H PRN    aluminum-magnesium hydroxide-simethicone (MYLANTA) 200-200-20 mg/5 mL oral suspension 30 mL  30 mL Oral Q6H PRN    docusate sodium (COLACE) capsule 100 mg  100 mg Oral BID PRN    FLUoxetine (PROzac) capsule 20 mg  20 mg Oral Daily    ondansetron (ZOFRAN) injection 4 mg  4 mg Intravenous Q6H PRN       No Known Allergies    Objective       Intake/Output Summary (Last 24 hours) at 01/17/18 1040  Last data filed at 01/17/18 0118   Gross per 24 hour   Intake             1000 ml   Output                0 ml   Net             1000 ml       Invasive Devices     Peripheral Intravenous Line            Peripheral IV 01/16/18 Right Antecubital less than 1 day                Physical Exam     OC/OP wnl  Scarring lip from prior hemangioma  Voice strong, no stridor  Neck supple, palpable mass L level IV  No nystagmus on exam, EOMI    Lab Results:   I have personally reviewed pertinent lab results  , CBC:   Lab Results   Component Value Date    WBC 10 20 (H) 01/16/2018    HGB 13 2 01/16/2018    HCT 38 2 01/16/2018    MCV 91 01/16/2018     01/16/2018    MCH 31 4 01/16/2018    MCHC 34 6 01/16/2018    RDW 12 6 01/16/2018    MPV 9 3 01/16/2018    NRBC 0 01/16/2018   , CMP:   Lab Results   Component Value Date     01/17/2018    K 3 6 01/17/2018     (H) 01/17/2018    CO2 27 01/17/2018    ANIONGAP 6 01/17/2018    BUN 6 01/17/2018    CREATININE 0 44 (L) 01/17/2018    GLUCOSE 90 01/17/2018    CALCIUM 8 4 01/17/2018    AST 9 01/16/2018    ALT 27 01/16/2018    ALKPHOS 121 (H) 01/16/2018    PROT 7 0 01/16/2018    ALBUMIN 3 4 (L) 01/16/2018    BILITOT 0 26 01/16/2018    EGFR 145 01/17/2018     Imaging Studies: I have personally reviewed pertinent reports     and I have personally reviewed pertinent films in PACS

## 2018-01-17 NOTE — PLAN OF CARE
Problem: INFECTION - ADULT  Goal: Absence of fever/infection during neutropenic period  INTERVENTIONS:  - Monitor WBC  - Implement neutropenic guidelines   Outcome: Completed Date Met: 01/17/18

## 2018-01-17 NOTE — SOCIAL WORK
CM met with the Pt at bedside to explain the CM role and discuss any potential D/C needs  Pt lives with her fiance in a 1st floor apartment with 4STE  PTA IADL's, Pt drives and is employed  No hx of HHC or IP rehab  Pt has hx of depression with no MH admission and managed by PCP  Pt's home pharmacy is [facility]

## 2018-01-17 NOTE — ED PROVIDER NOTES
History  Chief Complaint   Patient presents with    Dizziness     Pt states she was at work and felt really tired and dizzy  Pt states she sometimes feels like that but not to this extent  Pt took nap after work and still feels lethargic and states her "vision is not all there " Pt states it is "almost a chore to keep eyes open "     27-year-old female comes in for evaluation of headache; that started suddenly while at work today  States has history of posterior headaches; but this is frontal and different  She tried to take a nap; but it worsened  She is here for evaluation  She did feel near-syncope; but did not actually syncopize; no vertigo but a sensation of going to pass out  Denies chest pain; shortness of breath  She states she takes oral contraceptives and Prozac  She states 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  She is not sure if she has a unifying diagnosis for hemangiomas; but she was seen at [facility] multiple times when she was younger  Denies any falls trauma; or accidents  She states her eyes feel heavy; most like they are difficult to open and she intermittently has blurry vision  She has normal gait she has normal finger to nose and she has normal rapid alternating movements  Headache not present this morning  No neck stiffness  Motor strength is 5/5 and symmetrical  Patient appears well; she has no dysarthria and she complains of no dysphagia  Symptoms are not improving with rest             Prior to Admission Medications   Prescriptions Last Dose Informant Patient Reported? Taking? FLUoxetine (PROzac) 20 mg capsule 1/15/2018 at Unknown time  Yes Yes   Sig: Take 20 mg by mouth daily      Facility-Administered Medications: None       Past Medical History:   Diagnosis Date    Asthma        History reviewed  No pertinent surgical history  History reviewed  No pertinent family history    I have reviewed and agree with the history as documented  Social History   Substance Use Topics    Smoking status: Never Smoker    Smokeless tobacco: Never Used    Alcohol use Yes      Comment: occasional        Review of Systems   Constitutional: Positive for activity change  Negative for appetite change, chills and fever  HENT: Negative for congestion, ear pain, rhinorrhea, sore throat and trouble swallowing  Eyes: Positive for visual disturbance  Negative for photophobia and pain  Respiratory: Negative for cough, chest tightness, shortness of breath and wheezing  Cardiovascular: Negative for chest pain and palpitations  Gastrointestinal: Negative for abdominal distention, abdominal pain, constipation, diarrhea, nausea and vomiting  Genitourinary: Negative for difficulty urinating, dysuria, flank pain, hematuria and urgency  Musculoskeletal: Negative for arthralgias, back pain, joint swelling, neck pain and neck stiffness  Skin: Negative for color change, pallor and rash  Neurological: Positive for weakness and light-headedness  Negative for dizziness, seizures, syncope, speech difficulty and headaches  Diffuse nonfocal   Hematological: Negative for adenopathy  Psychiatric/Behavioral: Negative for confusion, hallucinations and self-injury         Physical Exam  ED Triage Vitals   Temperature Pulse Respirations Blood Pressure SpO2   01/16/18 1938 01/16/18 1938 01/16/18 1938 01/16/18 1938 01/16/18 1938   97 7 °F (36 5 °C) 99 16 148/92 97 %      Temp src Heart Rate Source Patient Position - Orthostatic VS BP Location FiO2 (%)   -- 01/16/18 2216 01/16/18 2216 01/16/18 2216 --    Monitor Lying Right arm       Pain Score       01/16/18 1938       Worst Possible Pain           Orthostatic Vital Signs  Vitals:    01/16/18 2030 01/16/18 2216 01/16/18 2318 01/17/18 0118   BP: 147/100 131/74 126/81 134/67   Pulse: 90 81 74 92   Patient Position - Orthostatic VS:  Lying Lying        Physical Exam   Constitutional: She is oriented to person, place, and time  She appears well-developed and well-nourished  No distress  HENT:   Head: Normocephalic and atraumatic  Right Ear: External ear normal    Left Ear: External ear normal    Mouth/Throat: Oropharynx is clear and moist  No oropharyngeal exudate  Eyes: Conjunctivae and EOM are normal  Pupils are equal, round, and reactive to light  Right eye exhibits no discharge  Left eye exhibits no discharge  Pupils 4 and reactive  Does appear to have slight nystagmus even with focusing  Neck: Normal range of motion  Neck supple  No tracheal deviation present  No thyromegaly present  Full flexion-extension and range of motion   Cardiovascular: Normal rate, normal heart sounds and intact distal pulses  No murmur heard  Pulmonary/Chest: Effort normal and breath sounds normal  No respiratory distress  She has no wheezes  She has no rales  Abdominal: Soft  Bowel sounds are normal  She exhibits no distension  There is no tenderness  There is no rebound and no guarding  Mild epigastric discomfort no rebound or guarding and no distention   Musculoskeletal: Normal range of motion  She exhibits no edema, tenderness or deformity  Lymphadenopathy:     She has no cervical adenopathy  Neurological: She is alert and oriented to person, place, and time  No cranial nerve deficit or sensory deficit  She exhibits normal muscle tone  GCS 15  Motor 5/5 and symmetrical  Cranial nerves 2 through 12 intact  No dysarthria  Full range of motion of neck  Normal finger to nose  Normal heel to shin  Heel to toe appears slightly unsteady; but patient states is baseline   Skin: Skin is warm  Capillary refill takes less than 2 seconds  No rash noted  She is not diaphoretic  No erythema  No pallor  Psychiatric: She has a normal mood and affect   Her behavior is normal  Judgment and thought content normal        ED Medications  Medications   FLUoxetine (PROzac) capsule 20 mg (not administered)   docusate sodium (COLACE) capsule 100 mg (not administered)   ondansetron (ZOFRAN) injection 4 mg (not administered)   aluminum-magnesium hydroxide-simethicone (MYLANTA) 200-200-20 mg/5 mL oral suspension 30 mL (not administered)   acetaminophen (TYLENOL) tablet 650 mg (not administered)   metoclopramide (REGLAN) injection 10 mg (10 mg Intravenous Given 1/16/18 2053)   sodium chloride 0 9 % bolus 1,000 mL (0 mL Intravenous Stopped 1/17/18 0118)   acetaminophen (TYLENOL) tablet 650 mg (650 mg Oral Given 1/16/18 2053)   potassium chloride (K-DUR,KLOR-CON) CR tablet 40 mEq (40 mEq Oral Given 1/16/18 2222)   iohexol (OMNIPAQUE) 350 MG/ML injection (MULTI-DOSE) 85 mL (85 mL Intravenous Given 1/16/18 2159)       Diagnostic Studies  Results Reviewed     Procedure Component Value Units Date/Time    Comprehensive metabolic panel [[id number]]  (Abnormal) Collected:  01/16/18 2051    Lab Status:  Final result Specimen:  Blood from Arm, Right Updated:  01/16/18 2123     Sodium 142 mmol/L      Potassium 3 2 (L) mmol/L      Chloride 108 mmol/L      CO2 28 mmol/L      Anion Gap 6 mmol/L      BUN 9 mg/dL      Creatinine 0 51 (L) mg/dL      Glucose 84 mg/dL      Calcium 8 6 mg/dL      AST 9 U/L      ALT 27 U/L      Alkaline Phosphatase 121 (H) U/L      Total Protein 7 0 g/dL      Albumin 3 4 (L) g/dL      Total Bilirubin 0 26 mg/dL      eGFR 138 ml/min/1 73sq m     Narrative:         National Kidney Disease Education Program recommendations are as follows:  GFR calculation is accurate only with a steady state creatinine  Chronic Kidney disease less than 60 ml/min/1 73 sq  meters  Kidney failure less than 15 ml/min/1 73 sq  meters      CBC and differential [[id number]]  (Abnormal) Collected:  01/16/18 2051    Lab Status:  Final result Specimen:  Blood from Arm, Right Updated:  01/16/18 2106     WBC 10 20 (H) Thousand/uL      RBC 4 21 Million/uL      Hemoglobin 13 2 g/dL      Hematocrit 38 2 %      MCV 91 fL      MCH 31 4 pg      MCHC 34 6 g/dL      RDW 12 6 %      MPV 9 3 fL      Platelets 217 Thousands/uL      nRBC 0 /100 WBCs      Neutrophils Relative 53 %      Lymphocytes Relative 39 %      Monocytes Relative 7 %      Eosinophils Relative 1 %      Basophils Relative 0 %      Neutrophils Absolute 5 44 Thousands/µL      Lymphocytes Absolute 3 95 Thousands/µL      Monocytes Absolute 0 72 Thousand/µL      Eosinophils Absolute 0 05 Thousand/µL      Basophils Absolute 0 02 Thousands/µL     POCT pregnancy, urine [[id number]]  (Normal) Resulted:  01/16/18 2104    Lab Status:  Final result Updated:  01/16/18 2104     EXT PREG TEST UR (Ref: Negative) Negative    POCT urinalysis dipstick [[id number]]  (Normal) Resulted:  01/16/18 2104    Lab Status:  Final result Updated:  01/16/18 2104     Color, UA results in chart    ED Urine Macroscopic [[id number]]  (Normal) Collected:  01/16/18 2106    Lab Status:  Final result Specimen:  Urine Updated:  01/16/18 2103     Color, UA Yellow     Clarity, UA Clear     pH, UA 6 0     Leukocytes, UA Negative     Nitrite, UA Negative     Protein, UA Negative mg/dl      Glucose, UA Negative mg/dl      Ketones, UA Negative mg/dl      Urobilinogen, UA 0 2 E U /dl      Bilirubin, UA Negative     Blood, UA Negative     Specific Gravity, UA 1 025    Narrative:       CLINITEK RESULT    Fingerstick Glucose (POCT) [[id number]]  (Normal) Collected:  01/16/18 2026    Lab Status:  Final result Updated:  01/16/18 2028     POC Glucose 75 mg/dl                  CTA head and neck with and without contrast   Final Result by Britni Goetz MD (01/16 2235)         1  No evidence of dissection, occlusion, aneurysm or significant stenosis  2  Large 4 2 x 4 5 x 5 7 cm soft tissue mass/neoplasm identified along the left medial supraclavicular region causing mass effect on the left thyroid gland and compressing the trachea  The mass displaces the common carotid artery and internal jugular    vein anteriorly  The mass is anterior to the left vertebral artery   Differential includes neurogenic tumor, metastatic disease or other etiology  Mild erosive changes along the left anterolateral aspect of C6 vertebral body is identified could possibly    related to the mass or degenerative change  Please follow-up with contrast enhanced neck MRI  Workstation performed: [id number]               Procedures  ECG 12 Lead Documentation  Date/Time: 1/17/2018 1:23 AM  Performed by: Ashley Carcamo by: Era Clinton     Indications / Diagnosis:  Near syncope  ECG reviewed by me, the ED Provider: yes    Patient location:  ED  Previous ECG:     Previous ECG:  Unavailable  Interpretation:     Interpretation: normal    Rate:     ECG rate:  86  Rhythm:     Rhythm: sinus rhythm    Ectopy:     Ectopy: none    QRS:     QRS axis:  Normal  Conduction:     Conduction: normal    ST segments:     ST segments:  Normal  T waves:     T waves: normal            Phone Consults  ED Phone Contact    ED Course  ED Course as of Jan 17 0131 Tue Jan 16, 2018 2142 replace Potassium: (!) 3 2   2307 Dr Modesto Hinojosa ENT discussed case will be able to see as consult no further w/o recommended this evening    2337 Lengthy discussion with family  Discussed findings of mass in  Attempted to generalized further steps that would be taken  Did discuss will admit for further evaluation  Pastoral care representative at bedside  MDM  Number of Diagnoses or Management Options  Headache:   Near syncope:   Neck mass:   Nystagmus:   Diagnosis management comments: Patient presents with headache with different presentation from prior  Started approximately 6 hours prior to arrival   CTA head and neck was done to evaluate for possible subarachnoid hemorrhage  Large neck mass was found concerning for malignancy  No acute intracranial findings were observed    Given that patient has large mass back in likely explain symptoms LP was not pursued this time given that patient is going to be admitted and subarachnoid is less likely given findings     Mass exhibiting mass effect on the surrounding structures  Possibly explaining patient's symptoms  Patient did have nystagmus; no other cerebellar signs; normal heel-shin normal finger-to-nose and normal rapid alternating movements  Patient's vital signs unremarkable  Patient without leukocytosis; or anemia  Patient without renal dysfunction  Patient mild hypokalemia which was repleted  Did speak with ENT and they did not recommend any further workup this evening  Patient admitted to medicine service  I did have long discussion with patient about findings and that further actions will need to be taken to diagnose the exact etiology  Did have pastoral care throughout discussion and family and patient voiced understanding  Patient admitted to Medicine service  Patient was a GCS of 15 with no focal extremity findings; no altered strength; or sensation  CritCare Time    Disposition  Final diagnoses:   Neck mass   Headache   Near syncope   Nystagmus     Time reflects when diagnosis was documented in both MDM as applicable and the Disposition within this note     Time User Action Codes Description Comment    1/16/2018 11:47 PM Kim Powers Add [R22 1] Neck mass     1/16/2018 11:47 PM Kim Powers Add [R51] Headache     1/16/2018 11:47 PM Kim Powers Add [R55] Near syncope     1/16/2018 11:47 PM Kim Powers Add [H55 00] Nystagmus       ED Disposition     ED Disposition Condition Comment    Admit  Case was discussed with Medicine and the patient's admission status was agreed to be Admission Status: inpatient status to the service of Dr Elvis Heranndez   Follow-up Information    None       Patient's Medications   Discharge Prescriptions    No medications on file     No discharge procedures on file  ED Provider  Attending physically available and evaluated Loni Ordoñez  I managed the patient along with the ED Attending      Electronically Signed by         Lauryn Landa,   01/17/18 5735

## 2018-01-17 NOTE — PROGRESS NOTES
01/16/18 [facility] 7 Involvement Patient active with [facility]   Spiritual Beliefs/Perceptions   Support Systems Spouse/significant other;Parent   Coping Responses   Patient Coping Accepting   Family Coping Anxiety; Fearful;Open/discussion   Family Spiritual Assessment   Fear of Death/Unknown fear of unknown   Plan of Care   Care Plan Initiated Yes   Comments Physician asked for  to accompany him as he explained tests results to 24 yo pt and her mother  Prayers w both  Pt is her parents' only child, and she is engaged to be   A tumor was found in her neck which will need to be biopsied  She has a history of tumors and surgeries to remove them  Family plans to call their        Assessment Completed by: Unit visit

## 2018-01-17 NOTE — DISCHARGE SUMMARY
Discharge Summary - [facility] Internal Medicine    Patient Information: Jennifer Chun 25 y o  female MRN: [id number]  Unit/Bed#: -01 Encounter: [id number]    Discharging Physician / Practitioner: Vinay Vang PA-C  PCP: Angelito Asher MD  Admission Date: 1/16/2018  Discharge Date: 01/17/18    Reason for Admission:  Neck Mass    Discharge Diagnoses:     Principal Problem:    Supraclavicular mass  Active Problems:    History of excision of hemangioma    Chronic nonintractable headache    Depression  Resolved Problems:    * No resolved hospital problems  *      Consultations During Hospital Stay:  · ENT Dr Aziza Shah    Procedures Performed:     CTA head and neck with and without contrast   Final Result by Claudio Stephens MD (01/16 2235)         1  No evidence of dissection, occlusion, aneurysm or significant stenosis  2  Large 4 2 x 4 5 x 5 7 cm soft tissue mass/neoplasm identified along the left medial supraclavicular region causing mass effect on the left thyroid gland and compressing the trachea  The mass displaces the common carotid artery and internal jugular    vein anteriorly  The mass is anterior to the left vertebral artery  Differential includes neurogenic tumor, metastatic disease or other etiology  Mild erosive changes along the left anterolateral aspect of C6 vertebral body is identified could possibly    related to the mass or degenerative change  Please follow-up with contrast enhanced neck MRI                     Workstation performed: [id number]         MRI soft tissue neck wo and w contrast    (Results Pending)     Significant Findings:    · Left level IV neck mass (4 2 x 4 5 x 5 7 centimeter soft tissue mass/neoplasm in the left medial supraclavicular region with mass effect on left thyroid, compressing the trachea, displacing common carotid artery and internal jugular vein anteriorly, anterior to left vertebral artery) DDX: neurogenic tumor vs  Metastatic disease vs  schwanoma vs  Thyroid mass vs  schwannoma    Incidental Findings:   · Mild erosive changes along left anterolateral aspect of C6 vertebral body     Test Results Pending at Discharge (will require follow up): · None     Outpatient Tests Requested:  · MRI tomorrow at 5 PM  · Pending MRI reports, plan will be for likely IR guided biopsy    Complications:  none    Hospital Course:     Chao Shah is a 25 y o  female patient who originally presented to the hospital on 1/16/2018 due to dizziness and headache  This occurred when she was at work  She attempted to take a nap but this worsened her symptoms and she felt near syncopal and therefore came into the emergency department  In the emergency department the patient was noted to have a 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  She was decannulated at 3-2/2years old  She was noted to have a CTA in the emergency department which was noted to show no evidence of subarachnoid hemorrhage  However she was noted to show neck mass measuring 4 5 x 4 2 x 5 7 cm, it was noted to have mass effect on left thyroid and compressing the trachea  There was noted to displace the common carotid artery and internal jugular vein anteriorly  The patient has had no respiratory issues, no palpable mass, no paresthesia  She was noted to have complete resolution of her headache  She was noted to be seen in consultation by ENT who felt this was likely a slow growing mass which was incidentally found on imaging  They recommended MRI of the neck and then based on these findings would likely plan for evaluation regarding potential need for biopsy (potentially IR guided if able with vasculature)  DDX includes schwannoma vs  thyroid mass (less likely) vs  Neurogenic tumor vs  Metastatic disease   The patient had no respiratory compromise, no neurological symptoms, no further headache or dizziness and was felt to be stable for d/c home with MRI as outpatient tomorrow at [address] [place] [address] at [facility]  The results will be communicated with PCP, our office and ENT  She is recommended to follow up with ENT by early next week to discuss results of this MRI report  Condition at Discharge: stable     Discharge Day Visit / Exam:     Subjective:  Feeling well  No dizziness, headache  No nausea, vomiting, diarrhea or abdominal pain  No paresthesias  No SOB  No chest pain  No neck pain  No dysphagia, dysarthria   Vitals: Blood Pressure: 141/97 (01/17/18 0709)  Pulse: 85 (01/17/18 0709)  Temperature: 98 5 °F (36 9 °C) (01/17/18 0709)  Temp Source: Oral (01/17/18 0709)  Respirations: 21 (01/17/18 0709)  Height: 5' 7" (170 2 cm) (01/17/18 0558)  Weight - Scale: 113 kg (249 lb 1 9 oz) (01/17/18 0558)  SpO2: 98 % (01/17/18 0709)  Exam:   Physical Exam   Constitutional: She is oriented to person, place, and time  No distress  HENT:   Head: Normocephalic and atraumatic  Lip scarring   Eyes: Conjunctivae are normal    Neck: No JVD present  Left supraclavicular palpable mass   Cardiovascular: Normal rate, regular rhythm, normal heart sounds and intact distal pulses  No murmur heard  Pulmonary/Chest: Effort normal and breath sounds normal  No respiratory distress (good air movement)  She has no wheezes  She has no rales  No distress, no stridor   Abdominal: Soft  Bowel sounds are normal  She exhibits no distension  There is no tenderness  There is no rebound and no guarding  obese   Musculoskeletal: She exhibits no edema  Neurological: She is alert and oriented to person, place, and time  No cranial nerve deficit  Coordination normal    EOMS intact without nystagmus, strabismus or lid lag  Speech clear  CN 2-12 intact  MS 5/5 in all four extremities  Speech without dysarthria  Skin: Skin is warm and dry  No rash noted  She is not diaphoretic  No erythema  Tracheostomy scar   Psychiatric: She has a normal mood and affect   Her behavior is normal    Nursing note and vitals reviewed  Discussion with Family: mother at bedside    Discharge instructions/Information to patient and family:   See after visit summary for information provided to patient and family  Provisions for Follow-Up Care:  See after visit summary for information related to follow-up care and any pertinent home health orders  Disposition:     Home    For Discharges to [facility]:   · Not Applicable to this Patient - Not Applicable to this Patient    Planned Readmission: no     Discharge Statement:  I spent >45 minutes discharging the patient  This time was spent on the day of discharge  I had direct contact with the patient on the day of discharge  Greater than 50% of the total time was spent examining patient, answering all patient questions, arranging and discussing plan of care with patient as well as directly providing post-discharge instructions  Additional time then spent on discharge activities  Discharge Medications:  See after visit summary for reconciled discharge medications provided to patient and family        ** Please Note: This note has been constructed using a voice recognition system **

## 2018-01-17 NOTE — ASSESSMENT & PLAN NOTE
Admission, medical-surgical floor  (Patient has no respiratory compromise  )  ENT consult to continue as there is need to do a biopsy  At this point, we will first need to know what the biopsy results will be prior to getting any other consults or workup

## 2018-01-17 NOTE — H&P
H&P- Sandy Winston 1995, 25 y o  female MRN: [id number]    Unit/Bed#: ED 18 Encounter: [id number]    Primary Care Provider: Joycelyn Pandya MD   Date and time admitted to hospital: 1/16/2018  8:13 PM        * Supraclavicular mass   Assessment & Plan    Admission, medical-surgical floor  (Patient has no respiratory compromise  )  ENT consult to continue as there is need to do a biopsy  At this point, we will first need to know what the biopsy results will be prior to getting any other consults or workup  Chronic nonintractable headache   Assessment & Plan    Patient will continue acetaminophen for headache  History of excision of hemangioma   Assessment & Plan    Currently stable  Depression   Assessment & Plan    Would continue Zoloft  VTE Prophylaxis: Pharmacologic VTE Prophylaxis contraindicated due to May need surgical intervention and patient is of low risk  / sequential compression device   Code Status: No Order full code  POLST: There is no POLST form on file for this patient (pre-hospital)    Anticipated Length of Stay:  Patient will be admitted on an Inpatient basis with an anticipated length of stay of  greater than 2 midnights  Justification for Hospital Stay: Please see detailed plans noted above  Chief Complaint:     Supraclavicular mass with headache  History of Present Illness:  Sandy Winston is a 25 y o  female who has a past medical history significant for being and in did throw fertilization baby  Likewise, at the time that she was 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 and that was already taken cared of as she was an infant and was intubated at that time and was confined in [facility]  The patient has been doing quite well however has on and off headaches which are located usually at around the posterior neck with radiation towards the occiput    Patient has depression and currently is on Prozac  Otherwise, the patient does not have much of a medical problem at this point  She occasionally may have migraine which comes and goes  No note of any neurologic deficit  The patient this morning was feeling different saying that her headache was somewhat more intense as well  The headache is characterized more to be located and frontal area  Somewhat throbbing  No photophobia  She did not lose consciousness  She did not fall or did have any ambulatory dysfunction  Patient earlier had blurry vision but currently has returned back to clear  No note of any black outs  Review of Systems:    Constitutional:  Denies fever or chills   Eyes:  Denies change in visual acuity but earlier had blurred vision  HENT:  Denies nasal congestion or sore throat   Respiratory:  Denies cough or shortness of breath   Cardiovascular:  Denies chest pain or edema   GI:  Denies abdominal pain, nausea, vomiting, bloody stools or diarrhea   :  Denies dysuria   Musculoskeletal:  Denies back pain or joint pain   Integument:  Denies rash   Neurologic:  Headache as noted above with no note of any focal weakness or sensory changes or falls  No loss of consciousness  Endocrine:  Denies polyuria or polydipsia   Lymphatic:  Denies swollen glands   Psychiatric:  Depression as noted    Past Medical and Surgical History:   Past Medical History:   Diagnosis Date    Asthma      History reviewed  No pertinent surgical history      Meds/Allergies:   Prozac 20 mg daily  Allergies: No Known Allergies  History:  Marital Status: Single   Occupation:  Works in  of the [employer]   Patient Pre-hospital Living Situation:  Lives at home with mom  Patient Pre-hospital Level of Mobility:  Ambulatory  Patient Pre-hospital Diet Restrictions:  Regular diet  Substance Use History:   History   Alcohol Use    Yes     Comment: occasional     History   Smoking Status    Never Smoker   Smokeless Tobacco    Never Used     History   Drug Use No       Family History:  History reviewed  No pertinent family history  Physical Exam:     Vitals:   Blood Pressure: 126/81 (01/16/18 2318)  Pulse: 74 (01/16/18 2318)  Temperature: 97 7 °F (36 5 °C) (01/16/18 1938)  Respirations: 18 (01/16/18 2318)  Weight - Scale: 99 8 kg (220 lb) (01/16/18 1938)  SpO2: 98 % (01/16/18 2318)    Constitutional:  Well developed, well nourished, no acute distress, non-toxic appearance and able to speak with no voice hoarseness  Eyes:  PERRL, conjunctiva normal   HENT:  Atraumatic, external ears normal, nose normal, oropharynx moist, no pharyngeal exudates  Neck- normal range of motion, no tenderness, supple ; note of supraclavicular pass which is delineated well and somewhat measuring 5 cm left supraclavicular area  Respiratory:  No respiratory distress, normal breath sounds, no rales, no wheezing   Cardiovascular:  Normal rate, normal rhythm, no murmurs, no gallops, no rubs   GI:  Soft, nondistended, normal bowel sounds, nontender, no organomegaly, no mass, no rebound, no guarding   :  No costovertebral angle tenderness   Musculoskeletal:  No edema, no tenderness, no deformities  Back- no tenderness  Integument:  Well hydrated, no rash   Lymphatic:  No lymphadenopathy noted   Neurologic:  Alert &awake, communicative, CN 2-12 normal, normal motor function, normal sensory function, no focal deficits noted   Psychiatric:  Speech and behavior appropriate       Lab Results: I have personally reviewed pertinent reports          Results from last 7 days  Lab Units 01/16/18 2051   WBC Thousand/uL 10 20*   HEMOGLOBIN g/dL 13 2   HEMATOCRIT % 38 2   PLATELETS Thousands/uL 338   NEUTROS PCT % 53   LYMPHS PCT % 39   MONOS PCT % 7   EOS PCT % 1       Results from last 7 days  Lab Units 01/16/18 2051   SODIUM mmol/L 142   POTASSIUM mmol/L 3 2*   CHLORIDE mmol/L 108   CO2 mmol/L 28   BUN mg/dL 9   CREATININE mg/dL 0 51*   CALCIUM mg/dL 8 6   TOTAL PROTEIN g/dL 7 0   BILIRUBIN TOTAL mg/dL 0 26   ALK PHOS U/L 121*   ALT U/L 27   AST U/L 9   GLUCOSE RANDOM mg/dL 84           EKG:  Normal sinus rhythm, no abnormal ST T wave changes  Imaging: I have personally reviewed pertinent reports  and I have personally reviewed pertinent films in PACS    Cta Head And Neck With And Without Contrast    Result Date: 1/16/2018  Narrative: CTA NECK AND BRAIN WITH AND WITHOUT CONTRAST INDICATION: eval for nystagmus, H/A, Hx hemangiomas  History taken directly from the electronic ordering system  COMPARISON:   None  TECHNIQUE:  Routine CT imaging of the Brain without contrast   Post contrast imaging was performed after administration of iodinated contrast through the neck and brain  Post contrast axial 0 625 mm images timed to opacify the arterial system  3D rendering was performed on an independent workstation  MIP reconstructions performed  Coronal reconstructions were performed of the noncontrast portion of the brain  Radiation dose length product (DLP) for this visit:  1392 19 mGy-cm   This examination, like all CT scans performed in the [facility], was performed utilizing techniques to minimize radiation dose exposure, including the use of iterative reconstruction and automated exposure control  IV Contrast:  85 mL of iohexol (OMNIPAQUE)     IMAGE QUALITY:   Diagnostic FINDINGS: NONCONTRAST BRAIN PARENCHYMA:  No intracranial mass, mass effect or midline shift  No acute intracranial hemorrhage  No CT signs of acute infarction  VENTRICLES AND EXTRA-AXIAL SPACES:  Normal for patient's age  VISUALIZED ORBITS AND PARANASAL SINUSES:  Unremarkable  CALVARIUM AND EXTRACRANIAL SOFT TISSUES:   Normal  CERVICAL VASCULATURE AORTIC ARCH AND GREAT VESSELS:  Normal aortic arch and great vessel origins  Normal visualized subclavian vessels  RIGHT VERTEBRAL ARTERY CERVICAL SEGMENT:  Normal origin  The vessel is normal in caliber throughout the neck   LEFT VERTEBRAL ARTERY CERVICAL SEGMENT:  Normal origin  The vessel is normal in caliber throughout the neck  RIGHT EXTRACRANIAL CAROTID SEGMENT:  Normal caliber common carotid artery  Normal bifurcation and cervical internal carotid artery  No stenosis or dissection  LEFT EXTRACRANIAL CAROTID SEGMENT:  Normal caliber common carotid artery  Normal bifurcation and cervical internal carotid artery  No stenosis or dissection  NASCET criteria was used to determine the degree of internal carotid artery diameter stenosis  INTRACRANIAL VASCULATURE INTERNAL CAROTID ARTERIES:  Normal enhancement of the intracranial portions of the internal carotid arteries  Normal ophthalmic artery origins  Normal ICA terminus  ANTERIOR CIRCULATION:  Symmetric A1 segments and anterior cerebral arteries with normal enhancement  Normal anterior communicating artery  MIDDLE CEREBRAL ARTERY CIRCULATION:  M1 segment and middle cerebral artery branches demonstrate normal enhancement bilaterally  DISTAL VERTEBRAL ARTERIES:  Normal distal vertebral arteries  Posterior inferior cerebellar artery origins are normal  Normal vertebral basilar junction  BASILAR ARTERY:  Basilar artery is normal in caliber  Normal superior cerebellar arteries  POSTERIOR CEREBRAL ARTERIES: Both posterior cerebral arteries arises from the basilar tip  Both arteries demonstrate normal flow-related enhancement  Normal posterior communicating arteries  DURAL VENOUS SINUSES:  Normal  NON VASCULAR ANATOMY BONY STRUCTURES:  Mild erosive changes along the left lateral aspect of C6 is identified could possibly related to the mass  SOFT TISSUES OF THE NECK:  There is a large 4 2 x 4 5 x 5 7 cm soft tissue mass/neoplasm identified along the left medial supraclavicular region causing mass effect on the left thyroid gland and compressing the trachea  The mass displaces the common carotid artery and internal jugular vein anteriorly   The mass is anterior to the left vertebral artery  Differential includes neurogenic tumor, metastatic disease or other etiology  Please follow-up with contrast enhanced neck MRI  Additionally, there is  prominent prevascular tissue which although could be residual thymus, I cannot exclude adenopathy  Impression: 1  No evidence of dissection, occlusion, aneurysm or significant stenosis  2  Large 4 2 x 4 5 x 5 7 cm soft tissue mass/neoplasm identified along the left medial supraclavicular region causing mass effect on the left thyroid gland and compressing the trachea  The mass displaces the common carotid artery and internal jugular vein anteriorly  The mass is anterior to the left vertebral artery  Differential includes neurogenic tumor, metastatic disease or other etiology  Mild erosive changes along the left anterolateral aspect of C6 vertebral body is identified could possibly related to the mass or degenerative change  Please follow-up with contrast enhanced neck MRI  Workstation performed: [id number]         ** Please Note: Dragon 360 Dictation voice to text software was used in the creation of this document   **

## 2018-01-18 ENCOUNTER — GENERIC CONVERSION - ENCOUNTER (OUTPATIENT)
Dept: OTHER | Facility: OTHER | Age: 23
End: 2018-01-18

## 2018-01-18 ENCOUNTER — HOSPITAL ENCOUNTER (OUTPATIENT)
Dept: RADIOLOGY | Facility: HOSPITAL | Age: 23
Discharge: HOME | End: 2018-01-18
Payer: COMMERCIAL

## 2018-01-18 DIAGNOSIS — R22.1 NECK MASS: ICD-10-CM

## 2018-01-18 PROCEDURE — 70543 MRI ORBT/FAC/NCK W/O &W/DYE: CPT

## 2018-01-18 PROCEDURE — A9585 GADOBUTROL INJECTION: HCPCS | Performed by: RADIOLOGY

## 2018-01-18 RX ADMIN — GADOBUTROL 10 ML: 604.72 INJECTION INTRAVENOUS at 17:39

## 2018-01-18 NOTE — RESULT NOTES
Message   Please call patient or her mother  MRI of brain is normal     Verified Results  * MRI BRAIN W WO CONTRAST [id number] 11:39AM Lexi Echevarria     Test Name Result Flag Reference   MRI BRAIN W WO CONTRAST (Report)     This is a summary report  The complete report is available in the patient's medical record  If you cannot access the medical record, please contact the sending organization for a detailed fax or copy  MRI BRAIN WITH AND WITHOUT CONTRAST     INDICATION: Chronic headaches associated with nausea, blurred vision, vertigo, and generalized weakness  COMPARISON: None  TECHNIQUE:   Sagittal T1, axial T2, axial FLAIR, axial T1, axial Stratton, axial diffusion  Sagittal, axial and coronal T1 postcontrast  Axial BRAVO post contrast     IV Contrast: Gadobutrol injection (SINGLE-DOSE) SOLN 9 mL Note: (SINGLE DOSE/MULTI DOSE) information refers to the container from which the contrast was acquired  Contrast was injected one time intravenously without immediate complication  IMAGE QUALITY:  Diagnostic  FINDINGS:     BRAIN PARENCHYMA: There is no discrete mass, mass effect or midline shift  No abnormal white matter signal identified  Brainstem and cerebellum demonstrate normal signal  There is no intracranial hemorrhage  There is no evidence of acute infarction and   diffusion imaging is unremarkable  Postcontrast imaging of the brain demonstrates no abnormal enhancement  VENTRICLES: Normal      SELLA AND PITUITARY GLAND: Normal      ORBITS: Normal      PARANASAL SINUSES: Normal      VASCULATURE: Evaluation of the major intracranial vasculature demonstrates appropriate flow voids  CALVARIUM AND SKULL BASE: Normal      EXTRACRANIAL SOFT TISSUES: Normal        IMPRESSION:     Normal examination  Workstation performed: [id number]     Signed by:   Taylor Delaney MD   12/1/16       Signatures   Electronically signed by :  Shelle Halsted, M D ; Dec  2 2016  4:06PM EST (Author)

## 2018-01-19 ENCOUNTER — ALLSCRIPTS OFFICE VISIT (OUTPATIENT)
Dept: OTHER | Facility: OTHER | Age: 23
End: 2018-01-19

## 2018-01-19 NOTE — ED ATTENDING ATTESTATION
Sheila Christian MD, saw and evaluated the patient  I have discussed the patient with the resident/non-physician practitioner and agree with the resident's/non-physician practitioner's findings, Plan of Care, and MDM as documented in the resident's/non-physician practitioner's note, except where noted  All available labs and Radiology studies were reviewed  At this point I agree with the current assessment done in the Emergency Department  I have conducted an independent evaluation of this patient a history and physical is as follows: This is a 67-year-old female who presents to the emergency department with sudden onset of headache  The patient states the headache began about 6 hours ago while at work  The patient has never had a headache like this before  It is bifrontal and severe  She laid down hoping it would make her feel better, as with different types of headaches in the past, sleeping makes them better  The patient woke up with headache worse than before  Additionally, she feels dizzy  The patient denies numbness, tingling, or weakness  She denies fevers or chills  She denies trauma  She has no abdominal pain  The patient denies neck pain  She has a 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  She is not being treated for any conditions currently  Her review of systems otherwise negative in 12 systems were reviewed  On exam the patient has non fatigable nystagmus bilaterally  Her neurological exam is otherwise intact, with normal gait, finger-to-nose, and rapid alternating movements  Her strength and sensation are normal  Her pupils are round reactive to light  Her cranial nerves are intact otherwise  Her oropharynx is clear  Her neck is supple and nontender  Her heart is regular with no murmurs, rubs, or gallops  Her lungs are clear and equal with no wheezes, rales, rhonchi    Her abdomen is soft and nontender with no masses, rebound, or guarding  Her back exam is normal  Her skin exam is normal   Impression:  Thunderclap headache, concern for subarachnoid, concern for dissection, we will plan to CT CTA head and neck        Critical Care Time  CritCare Time    Procedures

## 2018-01-20 NOTE — PROGRESS NOTES
1/20/18: Reviewed MRI report and discussed case via phone with Dr Kush Lanza  He was made aware of this MRI report already and has already discussed with surg/onc and neurosurgery  He personally called and spoke with the patient yesterday afternoon to discuss the result of this MRI and she will be seen in office by both ENT and surgical oncology  Plan will likely be core needle bx to rule out malignancy (less likely)  Would be unable to fully resect this mass

## 2018-01-23 VITALS
SYSTOLIC BLOOD PRESSURE: 122 MMHG | RESPIRATION RATE: 18 BRPM | TEMPERATURE: 98.8 F | DIASTOLIC BLOOD PRESSURE: 74 MMHG | HEART RATE: 88 BPM

## 2018-01-23 PROBLEM — Z86.018: Status: RESOLVED | Noted: 2018-01-17 | Resolved: 2018-01-23

## 2018-01-23 PROBLEM — A64 SEXUALLY TRANSMITTED DISEASE (STD): Status: ACTIVE | Noted: 2018-01-23

## 2018-01-23 PROBLEM — G47.30 SLEEP APNEA: Status: ACTIVE | Noted: 2018-01-23

## 2018-01-23 PROBLEM — A64 SEXUALLY TRANSMITTED DISEASE (STD): Status: RESOLVED | Noted: 2018-01-23 | Resolved: 2018-01-23

## 2018-01-23 PROBLEM — G47.30 SLEEP APNEA: Status: RESOLVED | Noted: 2018-01-23 | Resolved: 2018-01-23

## 2018-01-23 PROBLEM — Z98.890: Status: RESOLVED | Noted: 2018-01-17 | Resolved: 2018-01-23

## 2018-01-23 NOTE — MISCELLANEOUS
Message  Return to work or school:   Geraldo Palomares is under my professional care  She was seen in my office on 1/19/2018   She is able to return to work on  1/25/2018      PLEASE EXCUSE FROM WORK FROM WED  , JAN 17, 2018 Petrona Mcghee , JAN 25, 2018  Signatures   Electronically signed by :  AGUILA De La Cruz ; Jan 19 2018  1:17PM EST                       (Author)

## 2018-01-24 ENCOUNTER — DOCUMENTATION (OUTPATIENT)
Dept: SURGICAL ONCOLOGY | Facility: CLINIC | Age: 23
End: 2018-01-24

## 2018-01-24 ENCOUNTER — OFFICE VISIT (OUTPATIENT)
Dept: SURGICAL ONCOLOGY | Facility: CLINIC | Age: 23
End: 2018-01-24
Payer: COMMERCIAL

## 2018-01-24 VITALS
WEIGHT: 243 LBS | DIASTOLIC BLOOD PRESSURE: 84 MMHG | HEIGHT: 68 IN | TEMPERATURE: 98.2 F | BODY MASS INDEX: 36.83 KG/M2 | SYSTOLIC BLOOD PRESSURE: 124 MMHG | HEART RATE: 92 BPM

## 2018-01-24 DIAGNOSIS — R22.1 NECK MASS: Primary | ICD-10-CM

## 2018-01-24 DIAGNOSIS — R22.1 MASS IN NECK: ICD-10-CM

## 2018-01-24 PROBLEM — R22.2 SUPRACLAVICULAR MASS: Status: RESOLVED | Noted: 2018-01-17 | Resolved: 2018-01-24

## 2018-01-24 PROBLEM — R10.13 EPIGASTRIC PAIN: Status: ACTIVE | Noted: 2017-09-18

## 2018-01-24 PROBLEM — F41.8 DEPRESSION WITH ANXIETY: Status: ACTIVE | Noted: 2018-01-19

## 2018-01-24 PROBLEM — R51.9 CHRONIC NONINTRACTABLE HEADACHE: Status: RESOLVED | Noted: 2018-01-17 | Resolved: 2018-01-24

## 2018-01-24 PROBLEM — G89.29 CHRONIC NONINTRACTABLE HEADACHE: Status: RESOLVED | Noted: 2018-01-17 | Resolved: 2018-01-24

## 2018-01-24 PROBLEM — A60.00 GENITAL HERPES: Status: ACTIVE | Noted: 2017-02-14

## 2018-01-24 PROBLEM — A64 SEXUALLY TRANSMITTED DISEASE (STD): Status: ACTIVE | Noted: 2017-02-14

## 2018-01-24 PROBLEM — A60.00 GENITAL HERPES: Status: RESOLVED | Noted: 2017-02-14 | Resolved: 2018-01-24

## 2018-01-24 PROBLEM — A64 SEXUALLY TRANSMITTED DISEASE (STD): Status: RESOLVED | Noted: 2017-02-14 | Resolved: 2018-01-24

## 2018-01-24 PROCEDURE — 86850 RBC ANTIBODY SCREEN: CPT | Performed by: SURGERY

## 2018-01-24 PROCEDURE — 86901 BLOOD TYPING SEROLOGIC RH(D): CPT | Performed by: SURGERY

## 2018-01-24 PROCEDURE — 99245 OFF/OP CONSLTJ NEW/EST HI 55: CPT | Performed by: SURGERY

## 2018-01-24 PROCEDURE — 86900 BLOOD TYPING SEROLOGIC ABO: CPT | Performed by: SURGERY

## 2018-01-24 RX ORDER — VALACYCLOVIR HYDROCHLORIDE 500 MG/1
TABLET, FILM COATED ORAL
Refills: 3 | COMMUNITY
Start: 2017-12-25 | End: 2018-02-14 | Stop reason: SDUPTHER

## 2018-01-24 RX ORDER — ETHYNODIOL DIACETATE AND ETHINYL ESTRADIOL 1 MG-35MCG
1 KIT ORAL DAILY
COMMUNITY
End: 2018-02-14 | Stop reason: SDUPTHER

## 2018-01-24 RX ORDER — AMOXICILLIN 500 MG/1
CAPSULE ORAL
Refills: 0 | COMMUNITY
Start: 2017-12-18 | End: 2018-02-09

## 2018-01-24 RX ORDER — ETHYNODIOL DIACETATE AND ETHINYL ESTRADIOL 1 MG-35MCG
KIT ORAL
Refills: 4 | COMMUNITY
Start: 2017-12-23 | End: 2018-01-26 | Stop reason: SDUPTHER

## 2018-01-24 RX ORDER — LORAZEPAM 0.5 MG/1
1 TABLET ORAL EVERY 8 HOURS PRN
COMMUNITY
Start: 2018-01-19 | End: 2018-10-12 | Stop reason: SDUPTHER

## 2018-01-24 RX ORDER — FLUOXETINE 20 MG/1
TABLET, FILM COATED ORAL
Refills: 11 | COMMUNITY
Start: 2017-12-25 | End: 2018-10-12 | Stop reason: SDUPTHER

## 2018-01-24 RX ORDER — LORATADINE 10 MG/1
1 TABLET ORAL DAILY PRN
COMMUNITY

## 2018-01-24 RX ORDER — BUPROPION HYDROCHLORIDE 150 MG/1
1 TABLET, EXTENDED RELEASE ORAL 2 TIMES DAILY
COMMUNITY
End: 2018-02-09

## 2018-01-24 NOTE — MISCELLANEOUS
Assessment    1  Supraclavicular mass (786 6) (R22 2)   2  Depression with anxiety (300 4) (F41 8)   3  Family history of Pericardial cyst : Mother    Plan  Depression with anxiety    · LORazepam 0 5 MG Oral Tablet; TAKE 1 TABLET 3 times daily PRN anxiety   Rx By: Tyra Cline; Dispense: 10 Days ; #:30 Tablet; Refill: 0; For: Depression with anxiety; GURWINDER = N; Call Rx    Discussion/Summary  Discussion Summary:   Patient presents after recent hospitalization at [facility]  She originally presented with headache that has eventually resolved  CT brain incidentally revealed neck mass and further diagnostic workup confirmed presence of 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  Patient will be proceeding with further evaluation by Surgical Oncology  She is feeling generally well but is obviously worried about recent health developments  She specifically denies odynophagia and dysphagia and has reportedly noticed some left-sided neck asymmetry for quite a while but never paid significant attention  Past medical history is notorious for 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  Family history is notorious for hemochromatosis/father and pericardial cyst/mother  I am prescribing patient lorazepam to be used on a as needed basis only for this stressful time  We will be awaiting results of her consult with Surgical Oncology  All questions answered in length  Follow-up with primary care as needed  Patient may require further evaluation by GI and ENT, will let Surgical Oncology manage  Counseling Documentation With Imm: The patient, patient's family was counseled regarding instructions for management, impressions  Chief Complaint  Chief Complaint Free Text Note Form: MAYANK: PT was admitted to [facility] from 01/16/2018 through 01/17/2018  Dx: Supraclavicular mass (Left neck mass)  Spoke with pt who reports she is doing ok  Denies dysphagia, SOB or fever  Pt states she is scheduled for an MRI of the lefy neck today at 4pm at [facility]  Follow up with PCP scheduled for 01/19/2017 @ 11:45am       History of Present Illness  TCM Communication [facility]: The patient is being contacted for follow-up after hospitalization and 01/18/2018  She was hospitalized at [facility]  The date of admission: 01/16/2018, date of discharge: 01/17/2018  Diagnosis: left neck mass  She was discharged to home  Medications reviewed and updated today  She scheduled a follow up appointment  The patient is currently asymptomatic  Counseling was provided to the patient  Topics counseled included importance of compliance with treatment  Communication performed and completed by Pauline Hickey       HPI: Patient presents for follow-up after recent hospitalization at [facility] from January 16th through January 17th  She presented to [facility] Emergency room for evaluation of headache and had CT scan of brain done which incidentally noted left-sided neck/chest mass  Patient was hospitalized and underwent further evaluation with CTA head and neck with and without contrast which revealed large soft tissue mass/ neoplasm identified along the left medial supraclavicular region causing mass effect on the left thyroid gland and compressing the trachea, size of the mass by CT criteria described as 4 2x 4 5 x 5 7 centimeters  Radiologist also noted mild erosive changes along the left anterior lateral aspect of C6 vertebral body which could be related to mass effect or degenerative change  Patient subsequently was discharged home and had MRI of neck performed on January 18th  She is here today accompanied by her fiance mother to review those results    Past surgical history: tracheostomy tube from age 11 month till 4 years- due to hemangiomas  Patient admits to no dyspnea' no dysphagia  She now thinks that she always observed some asymmetry of her left neck compared to the right but never acknowledged any particular discomfort  No dysphagia, no odynophagia   HA has resolved  Patient generally feels well but is obviously extremely worried about results of recent testing  MRI results reviewed in length today with patient and her family  I did contact [facility], appointment for patient is set for January 24th with Dr Nikia Blanc, who also has mentioned possibility of consulting Neurosurgery for Co joint case      Review of Systems  Complete-Female:   Constitutional: No fever, no chills, feels well, no tiredness, no recent weight gain or weight loss  Eyes: No complaints of eye pain, no red eyes, no eyesight problems, no discharge, no dry eyes, no itching of eyes  ENT: as noted in HPI  Cardiovascular: No complaints of slow heart rate, no fast heart rate, no chest pain, no palpitations, no leg claudication, no lower extremity edema  Respiratory: No complaints of shortness of breath, no wheezing, no cough, no SOB on exertion, no orthopnea, no PND  Gastrointestinal: No complaints of abdominal pain, no constipation, no nausea or vomiting, no diarrhea, no bloody stools  Genitourinary: No complaints of dysuria, no incontinence, no pelvic pain, no dysmenorrhea, no vaginal discharge or bleeding  Musculoskeletal: No complaints of arthralgias, no myalgias, no joint swelling or stiffness, no limb pain or swelling  Integumentary: No complaints of skin rash or lesions, no itching, no skin wounds, no breast pain or lump  Neurological: No complaints of headache, no confusion, no convulsions, no numbness, no dizziness or fainting, no tingling, no limb weakness, no difficulty walking  Psychiatric: anxiety  Endocrine: No complaints of proptosis, no hot flashes, no muscle weakness, no deepening of the voice, no feelings of weakness  Hematologic/Lymphatic: mass left neck, but as noted in HPI  Active Problems    1  Asthma (269 69) (S17 998)   2   Chronic fatigue (780 79) (R53 82)   3  Genital herpes (054 10) (A60 00)   4  Hemangioma (228 00) (D18 00)   5  Need for influenza vaccination (V04 81) (Z23)   6  Scoliosis (737 30) (M41 9)   7  Sleep apnea (780 57) (G47 30)   8  Vitamin D deficiency (268 9) (E55 9)   9  Weight gain (783 1) (R63 5)    Past Medical History    1  History of Chlamydia trachomatis infection of lower genitourinary site (099 53) (A56 09)   2  History of  0 (V49 89)   3  History of varicella (V12 09) (Z86 19)    Surgical History    1  History of Lip Surgery   2  History of Neck Surgery   3  History of Tonsillectomy With Adenoidectomy   4  History of Tracheostomy  Surgical History Reviewed: The surgical history was reviewed and updated today  Family History  Mother    1  Family history of Generalized Anxiety Disorder   2  Family history of Hypertension (V17 49)   3  Family history of Pericardial cyst  Father    4  Family history of hemochromatosis (V18 19) (Z83 49)   5  Family history of hypertension (V17 49) (Z82 49)   6  Family history of Nonalcoholic Fatty Liver Disease  Maternal Grandmother    7  Family history of hypertension (V17 49) (Z82 49)  Paternal Grandmother    6  Family history of Bone cancer   9  Family history of thyroid disease (V18 19) (Z83 49)  Maternal Grandfather    10  Family history of lung cancer (V16 1) (Z80 1)  Paternal Grandfather    6  Family history of lung cancer (V16 1) (Z80 1)  Maternal Aunt    12  Family history of thyroid disease (V18 19) (Z83 49)  Paternal Aunt    15  Family history of type 2 diabetes mellitus (V18 0) (Z83 3)  Paternal Uncle    15  Family history of type 2 diabetes mellitus (V18 0) (Z83 3)  Family History Reviewed: The family history was reviewed and updated today  Social History    · Alcohol use   · Always uses seat belt   · Caffeine use (V49 89) (F15 90)   · Denied: History of Drug use   · Jainism   · Never A Smoker   · Oral contraceptives   · Single  Social History Reviewed:  The social history was reviewed and updated today  Current Meds   1  Claritin 10 MG Oral Tablet; TAKE 1 TABLET DAILY AS NEEDED; Therapy: (Recorded:19Jan2018) to Recorded   2  Chergena Clunes 1/35 1-35 MG-MCG Oral Tablet; Take 1 tablet daily; Therapy: (Recorded:19Jan2018) to Recorded   3  PROzac 20 MG Oral Capsule; take 1 tab po bid; Therapy: (Recorded:19Jan2018) to Recorded   4  ValACYclovir HCl - 500 MG Oral Tablet; Take 1 tablet daily; Therapy: [id number] to (Last Rx:[id number])  Requested for: [id number] Ordered  Medication List Reviewed: The medication list was reviewed and updated today  Allergies    1  No Known Drug Allergies    Vitals  Signs   Recorded: [id number] 11:54AM   Temperature: 98 8 F  Heart Rate: 88  Respiration: 18  Systolic: 142  Diastolic: 74  Patient Refused Height: Yes  Patient Refused Weight: Yes  Patient Refused Height: Yes  Patient Refused Weight: Yes    Physical Exam    Constitutional   General appearance: No acute distress, well appearing and well nourished  well developed and overweight  Pulmonary   Respiratory effort: No increased work of breathing or signs of respiratory distress  Auscultation of lungs: Clear to auscultation  Cardiovascular   Auscultation of heart: Normal rate and rhythm, normal S1 and S2, without murmurs  Examination of extremities for edema and/or varicosities: Normal     Carotid pulses: Normal     Musculoskeletal   Gait and station: Normal     Neurologic   Cranial nerves: Cranial nerves 2-12 intact  Psychiatric   Orientation to person, place, and time: Normal     Mood and affect: Normal     Additional Exam:  Protruding left soft tissue mass left lateral neck, nontender, no erythema, no induration          Results/Data  * MRI SOFT TISSUE NECK WO AND W CONTRAST [id number] 04:37PM EPIC, Provider   Test ordered by: Alli Gonzalez     Test Name Result Flag Reference   MRI SOFT TISSUE NECK WO AND W CONTRAST (Report)     MRI OF THE SOFT TISSUES OF THE NECK - WITH AND WITHOUT CONTRAST     INDICATION: R22 1: Localized swelling, mass and lump, neck  History taken directly from the electronic ordering system  Left-sided neck mass     COMPARISON: 1/16/2018     TECHNIQUE: Sagittal T1, axial T2, axial inversion recovery or fat suppressed T2 and axial T1 precontrast  Axial and coronal T1 postcontrast with fat suppression  IV Contrast: 10 mL of gadobutrol injection (MULTI-DOSE)      IMAGE QUALITY: Diagnostic  FINDINGS:     VISUALIZED BRAIN PARENCHYMA: Normal      VISUALIZED ORBITS AND PARANASAL SINUSES: Normal      NASAL CAVITY AND NASOPHARYNX: Normal      SUPRAHYOID NECK: Normal oral cavity, tongue base, tonsillar fossa and epiglottis  Marked expansion of the parapharyngeal fat pads bilaterally with slight mass effect on the pterygoid muscles which are mildly displaced anteriorly  There is also    asymmetric lipomatous hypertrophy of the submandibular fat pads left greater than right  INFRAHYOID NECK: Aryepiglottic folds, laryngeal tissues, and piriform sinuses are normal  There is a T1 hypointense bilobed ovoid mass in the left supraclavicular fossa, displacing the trachea and left lobe of the thyroid gland towards the right  There is associated tracheal narrowing, the narrowest transverse cross-sectional diameter of the trachea is approximately 0 5 cm on image 42, series 3 with an AP diameter of approximately 1 6 cm at this level  The left paratracheal/supraclavicular mass    is slightly heterogeneous with a few internal T1 hyperintense elements  It demonstrates exuberant enhancement and has a bilobed morphology, the more posterior margin extending to the left neural foramen at C6-C7  The posterior bilobed configuration    appears to be associated with the exiting nerve root at this level, perhaps best seen on image 39, series 9 but well seen on other images as well including image 39 of series 6   The tumor enhances exuberantly and has T2 hyperintensity, characteristics    of a nerve sheath tumor  There are smooth margins of the mass  In aggregate the tumor measures approximately 6 7 cm maximal craniocaudal dimension x 5 1 cm maximal AP dimension x 4 2 cm maximal transverse dimension  THYROID GLAND:  The left lobe of the thyroid gland is effaced and compressed, displaced anteriorly into the right  PAROTID AND SUBMANDIBULAR GLANDS: Normal      LYMPH NODES: No pathologic or enlarged adenopathy  VASCULAR STRUCTURES: Grossly normal flow voids of the cervical vasculature  THORACIC INLET: Lung apices and upper mediastinum are grossly unremarkable  CERVICAL SPINE: Normal alignment of the cervical spine  Normal marrow signal  No gross evidence of degenerative disease  Cervical cord is grossly normal in signal        IMPRESSION:     Large, 6 7 cm exuberantly enhancing solid bilobed mass in the left paratracheal/supraclavicular region with the posterior margin of the tumor associated with the left C6-C7 neural foramen, most suggestive of a bilobed nerve sheath tumor such as    schwannoma or neurofibroma which is compressing and displacing the trachea to the right  Other mass lesions not excluded but felt to be less likely given the bilobed configuration and association with the neural foramen  Does the patient have stigmata    of neurofibromatosis? Workstation performed: [id number]     Signed by: Yonathan Alford MD   1/18/18     Future Appointments    Date/Time Provider Specialty Site   02/12/2018 03:20 PM Radha Palomares MD Gastroenterology [facility] 9     Signatures   Electronically signed by :  AGUILA De Anda ; Jan 22 2018  1:07PM EST                       (Author)

## 2018-01-24 NOTE — PROGRESS NOTES
Surgical Oncology Follow Up       [address]  [facility] [address] Deepa Villa  1995  [id number]  [address]  [facility] [id number]    No chief complaint on file  Assessment/Plan          No history exists  History of Present Illness:   22-year-ol female who has been having increasing headaches, and occasional difficulty swallowing  She ultimately was seen in the ER on January 16th  This revealed a 4 2 x 4 5 x 5 7 cm soft tissue mass in the left supraclavicular region  This was compressing the thyroid as well as the trachea  This displaced to the common carotid artery and the internal jugular vein anteriorly  This was anterior to the left vertebral artery  MRI on January 18, 2018 revealed a 6 7 cm enhancing solid bilobed mass  The posterior margin of the tumor was associated with the left C6-C7 neural foramen  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  I personally reviewed her films  She comes in now to discuss further therapy  She occasionally has a pressure sensation in her neck when she lies down  She does have occasional dysphagia  No hoarseness  She does have some worsening shortness of breath  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  There was a lymph leak at that time and she also had a tracheostomy for 3 years  Review of Systems   Constitutional: Positive for fatigue  HENT: Negative  Eyes: Negative  Respiratory: Positive for shortness of breath  Cardiovascular: Negative  Gastrointestinal: Negative  Endocrine: Negative  Genitourinary: Negative  Musculoskeletal: Negative  Skin: Negative  Allergic/Immunologic: Negative  Neurological: Negative  Hematological: Negative  Psychiatric/Behavioral: Negative  Patient Active Problem List   Diagnosis    Depression    Chronic fatigue    Depression with anxiety    Vitamin D deficiency    Scoliosis    Epigastric pain    Asthma    Hemangioma    Sleep apnea    Weight gain     Past Medical History:   Diagnosis Date    Asthma     Sexually transmitted disease (STD)     Sleep apnea      Past Surgical History:   Procedure Laterality Date    NECK SURGERY      SURGERY OF LIP      TONSILLECTOMY AND ADENOIDECTOMY      TRACHEOSTOMY       Family History   Problem Relation Age of Onset    Hemochromatosis Father     Lung cancer Maternal Grandfather     Bone cancer Paternal Grandmother     Lung cancer Paternal Grandfather      Social History     Social History    Marital status: Single     Spouse name: N/A    Number of children: N/A    Years of education: N/A     Occupational History    Not on file       Social History Main Topics    Smoking status: Never Smoker    Smokeless tobacco: Never Used    Alcohol use Yes      Comment: occasional    Drug use: No    Sexual activity: Not on file     Other Topics Concern    Not on file     Social History Narrative    No narrative on file       Current Outpatient Prescriptions:     buPROPion (WELLBUTRIN SR) 150 mg 12 hr tablet, Take 1 tablet by mouth 2 (two) times a day, Disp: , Rfl:     ethynodiol-ethinyl estradiol (Loise Lek 1/35) 1-35 MG-MCG per tablet, Take 1 tablet by mouth daily, Disp: , Rfl:     FLUoxetine (PROzac) 20 MG tablet, TAKE 1/2-1 TABLET AT BEDTIME, Disp: , Rfl: 11    KELNOR 1/35 1-35 MG-MCG per tablet, 1 ORAL TAKE AS DIRECTED, Disp: , Rfl: 4    loratadine (CLARITIN) 10 mg tablet, Take 1 tablet by mouth daily as needed, Disp: , Rfl:     LORazepam (ATIVAN) 0 5 mg tablet, Take 1 tablet by mouth 3 (three) times a day, Disp: , Rfl:     valACYclovir (VALTREX) 500 mg tablet, TAKE 1 TABLET DAILY, Disp: , Rfl: 3    amoxicillin (AMOXIL) 500 mg capsule, TAKE 1 CAPSULE 3 TIMES A DAY, Disp: , Rfl: 0  No Known Allergies  Vitals:    01/24/18 1403   BP: 124/84   Pulse: 92   Temp: 98 2 °F (36 8 °C)       Physical Exam   Constitutional: She appears well-developed  HENT:   Head: Normocephalic  Eyes: EOM are normal  Pupils are equal, round, and reactive to light  Neck: Normal range of motion  Tracheal deviation present  There is a palpable left neck mass  This is 6 cm in size  Cardiovascular: Normal rate, regular rhythm and normal heart sounds  Pulmonary/Chest: Effort normal and breath sounds normal    Abdominal: Soft  Musculoskeletal: Normal range of motion  Lymphadenopathy:   No cervical, axillary, or inguinal adenopathy bilaterally  Neurological: She is alert  Skin: Skin is warm  Psychiatric: She has a normal mood and affect  Her behavior is normal  Judgment normal        Pathology:      Labs:      Imaging  Cta Head And Neck With And Without Contrast    Result Date: 1/16/2018  Narrative: CTA NECK AND BRAIN WITH AND WITHOUT CONTRAST INDICATION: eval for nystagmus, H/A, Hx hemangiomas  History taken directly from the electronic ordering system  COMPARISON:   None  TECHNIQUE:  Routine CT imaging of the Brain without contrast   Post contrast imaging was performed after administration of iodinated contrast through the neck and brain  Post contrast axial 0 625 mm images timed to opacify the arterial system  3D rendering was performed on an independent workstation  MIP reconstructions performed  Coronal reconstructions were performed of the noncontrast portion of the brain  Radiation dose length product (DLP) for this visit:  1392 19 mGy-cm   This examination, like all CT scans performed in the [facility], was performed utilizing techniques to minimize radiation dose exposure, including the use of iterative reconstruction and automated exposure control     IV Contrast:  85 mL of iohexol (OMNIPAQUE)     IMAGE QUALITY:   Diagnostic FINDINGS: NONCONTRAST BRAIN PARENCHYMA:  No intracranial mass, mass effect or midline shift  No acute intracranial hemorrhage  No CT signs of acute infarction  VENTRICLES AND EXTRA-AXIAL SPACES:  Normal for patient's age  VISUALIZED ORBITS AND PARANASAL SINUSES:  Unremarkable  CALVARIUM AND EXTRACRANIAL SOFT TISSUES:   Normal  CERVICAL VASCULATURE AORTIC ARCH AND GREAT VESSELS:  Normal aortic arch and great vessel origins  Normal visualized subclavian vessels  RIGHT VERTEBRAL ARTERY CERVICAL SEGMENT:  Normal origin  The vessel is normal in caliber throughout the neck  LEFT VERTEBRAL ARTERY CERVICAL SEGMENT:  Normal origin  The vessel is normal in caliber throughout the neck  RIGHT EXTRACRANIAL CAROTID SEGMENT:  Normal caliber common carotid artery  Normal bifurcation and cervical internal carotid artery  No stenosis or dissection  LEFT EXTRACRANIAL CAROTID SEGMENT:  Normal caliber common carotid artery  Normal bifurcation and cervical internal carotid artery  No stenosis or dissection  NASCET criteria was used to determine the degree of internal carotid artery diameter stenosis  INTRACRANIAL VASCULATURE INTERNAL CAROTID ARTERIES:  Normal enhancement of the intracranial portions of the internal carotid arteries  Normal ophthalmic artery origins  Normal ICA terminus  ANTERIOR CIRCULATION:  Symmetric A1 segments and anterior cerebral arteries with normal enhancement  Normal anterior communicating artery  MIDDLE CEREBRAL ARTERY CIRCULATION:  M1 segment and middle cerebral artery branches demonstrate normal enhancement bilaterally  DISTAL VERTEBRAL ARTERIES:  Normal distal vertebral arteries  Posterior inferior cerebellar artery origins are normal  Normal vertebral basilar junction  BASILAR ARTERY:  Basilar artery is normal in caliber  Normal superior cerebellar arteries  POSTERIOR CEREBRAL ARTERIES: Both posterior cerebral arteries arises from the basilar tip  Both arteries demonstrate normal flow-related enhancement     Normal posterior communicating arteries  DURAL VENOUS SINUSES:  Normal  NON VASCULAR ANATOMY BONY STRUCTURES:  Mild erosive changes along the left lateral aspect of C6 is identified could possibly related to the mass  SOFT TISSUES OF THE NECK:  There is a large 4 2 x 4 5 x 5 7 cm soft tissue mass/neoplasm identified along the left medial supraclavicular region causing mass effect on the left thyroid gland and compressing the trachea  The mass displaces the common carotid artery and internal jugular vein anteriorly  The mass is anterior to the left vertebral artery  Differential includes neurogenic tumor, metastatic disease or other etiology  Please follow-up with contrast enhanced neck MRI  Additionally, there is  prominent prevascular tissue which although could be residual thymus, I cannot exclude adenopathy  Impression: 1  No evidence of dissection, occlusion, aneurysm or significant stenosis  2  Large 4 2 x 4 5 x 5 7 cm soft tissue mass/neoplasm identified along the left medial supraclavicular region causing mass effect on the left thyroid gland and compressing the trachea  The mass displaces the common carotid artery and internal jugular vein anteriorly  The mass is anterior to the left vertebral artery  Differential includes neurogenic tumor, metastatic disease or other etiology  Mild erosive changes along the left anterolateral aspect of C6 vertebral body is identified could possibly related to the mass or degenerative change  Please follow-up with contrast enhanced neck MRI  Workstation performed: [id number]     Mri Soft Tissue Neck Wo And W Contrast    Result Date: 1/18/2018  Narrative: MRI OF THE SOFT TISSUES OF THE NECK - WITH AND WITHOUT CONTRAST INDICATION:  R22 1: Localized swelling, mass and lump, neck  History taken directly from the electronic ordering system   Left-sided neck mass COMPARISON:  1/16/2018 TECHNIQUE:  Sagittal T1, axial T2, axial inversion recovery or fat suppressed T2 and axial T1 precontrast  Axial and coronal T1 postcontrast with fat suppression  IV Contrast:  10 mL of gadobutrol injection (MULTI-DOSE) IMAGE QUALITY:  Diagnostic  FINDINGS: VISUALIZED BRAIN PARENCHYMA:  Normal  VISUALIZED ORBITS AND PARANASAL SINUSES:  Normal  NASAL CAVITY AND NASOPHARYNX:  Normal  SUPRAHYOID NECK:  Normal oral cavity, tongue base, tonsillar fossa and epiglottis  Marked expansion of the parapharyngeal fat pads bilaterally with slight mass effect on the pterygoid muscles which are mildly displaced anteriorly  There is also asymmetric lipomatous hypertrophy of the submandibular fat pads left greater than right  INFRAHYOID NECK:  Aryepiglottic folds, laryngeal tissues, and piriform sinuses are normal   There is a T1 hypointense bilobed ovoid mass in the left supraclavicular fossa, displacing the trachea and left lobe of the thyroid gland towards the right  There is associated tracheal narrowing, the narrowest transverse cross-sectional diameter of the trachea is approximately 0 5 cm on image 42, series 3 with an AP diameter of approximately 1 6 cm at this level  The left paratracheal/supraclavicular mass is slightly heterogeneous with a few internal T1 hyperintense elements  It demonstrates exuberant enhancement and has a bilobed morphology, the more posterior margin extending to the left neural foramen at C6-C7  The posterior bilobed configuration appears to be associated with the exiting nerve root at this level, perhaps best seen on image 39, series 9 but well seen on other images as well including image 39 of series 6  The tumor enhances exuberantly and has T2 hyperintensity, characteristics of a nerve sheath tumor  There are smooth margins of the mass  In aggregate the tumor measures approximately 6 7 cm maximal craniocaudal dimension x 5 1 cm maximal AP dimension x 4 2 cm maximal transverse dimension   THYROID GLAND:   The left lobe of the thyroid gland is effaced and compressed, displaced anteriorly into the right  PAROTID AND SUBMANDIBULAR GLANDS:  Normal  LYMPH NODES:  No pathologic or enlarged adenopathy  VASCULAR STRUCTURES:  Grossly normal flow voids of the cervical vasculature  THORACIC INLET: Lung apices and upper mediastinum are grossly unremarkable  CERVICAL SPINE:  Normal alignment of the cervical spine  Normal marrow signal   No gross evidence of degenerative disease  Cervical cord is grossly normal in signal      Impression: Large, 6 7 cm exuberantly enhancing solid bilobed mass in the left paratracheal/supraclavicular region with the posterior margin of the tumor associated with the left C6-C7 neural foramen, 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  I have discussed this previously with Dr Yeyo Mays  Given that this is symptomatic, and is probably a schwannoma, I recommended excision  I did discuss that if these do have a risk of malignant degeneration and given her young age I would recommend surgical excision  The risks were explained including bleeding, infection, recurrence, need for further surgery, wound complications, adjacent organ injury, neurovascular injury, MI, stroke, DVT, pulmonary embolism, and death  Informed consent was obtained  We will coordinate this with Dr Yeyo Mays and we will schedule this at our earliest mutual convenience  She and her mother are agreeable to this  All their questions were answered

## 2018-01-24 NOTE — LETTER
January 24, 2018     Sri Martinez MD  [address] [facility] [address]    Patient: Carlos Woo   YOB: 1995   Date of Visit: 1/24/2018       Dear Dr Valerie Dennison: Thank you for referring Kimberly Reyna to me for evaluation  Below are my notes for this consultation  If you have questions, please do not hesitate to call me  I look forward to following your patient along with you  Sincerely,        Sergio Bruno MD        CC: MD Sergio Madera MD  1/24/2018  2:54 PM  Signed               Surgical Oncology Follow Up       [address] [place] [address]  CANCER CARE [facility] SURGICAL ONCOLOGY [address]  1995  [id number]  [address] [place] [address]  CANCER CARE [facility] SURGICAL ONCOLOGY [address] [address]    No chief complaint on file  Assessment/Plan          No history exists  History of Present Illness:   22-year-ol female who has been having increasing headaches, and occasional difficulty swallowing  She ultimately was seen in the ER on January 16th  This revealed a 4 2 x 4 5 x 5 7 cm soft tissue mass in the left supraclavicular region  This was compressing the thyroid as well as the trachea  This displaced to the common carotid artery and the internal jugular vein anteriorly  This was anterior to the left vertebral artery  MRI on January 18, 2018 revealed a 6 7 cm enhancing solid bilobed mass  The posterior margin of the tumor was associated with the left C6-C7 neural foramen  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  I personally reviewed her films  She comes in now to discuss further therapy  She occasionally has a pressure sensation in her neck when she lies down  She does have occasional dysphagia  No hoarseness  She does have some worsening shortness of breath    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  There was a lymph leak at that time and she also had a tracheostomy for 3 years  Review of Systems   Constitutional: Positive for fatigue  HENT: Negative  Eyes: Negative  Respiratory: Positive for shortness of breath  Cardiovascular: Negative  Gastrointestinal: Negative  Endocrine: Negative  Genitourinary: Negative  Musculoskeletal: Negative  Skin: Negative  Allergic/Immunologic: Negative  Neurological: Negative  Hematological: Negative  Psychiatric/Behavioral: Negative  Patient Active Problem List   Diagnosis    Depression    Chronic fatigue    Depression with anxiety    Vitamin D deficiency    Scoliosis    Epigastric pain    Asthma    Hemangioma    Sleep apnea    Weight gain     Past Medical History:   Diagnosis Date    Asthma     Sexually transmitted disease (STD)     Sleep apnea      Past Surgical History:   Procedure Laterality Date    NECK SURGERY      SURGERY OF LIP      TONSILLECTOMY AND ADENOIDECTOMY      TRACHEOSTOMY       Family History   Problem Relation Age of Onset    Hemochromatosis Father     Lung cancer Maternal Grandfather     Bone cancer Paternal Grandmother     Lung cancer Paternal Grandfather      Social History     Social History    Marital status: Single     Spouse name: N/A    Number of children: N/A    Years of education: N/A     Occupational History    Not on file       Social History Main Topics    Smoking status: Never Smoker    Smokeless tobacco: Never Used    Alcohol use Yes      Comment: occasional    Drug use: No    Sexual activity: Not on file     Other Topics Concern    Not on file     Social History Narrative    No narrative on file       Current Outpatient Prescriptions:     buPROPion (WELLBUTRIN SR) 150 mg 12 hr tablet, Take 1 tablet by mouth 2 (two) times a day, Disp: , Rfl:     ethynodiol-ethinyl estradiol (Alta Holter 1/35) 1-35 MG-MCG per tablet, Take 1 tablet by mouth daily, Disp: , Rfl:     FLUoxetine (PROzac) 20 MG tablet, TAKE 1/2-1 TABLET AT BEDTIME, Disp: , Rfl: 11    KELNOR 1/35 1-35 MG-MCG per tablet, 1 ORAL TAKE AS DIRECTED, Disp: , Rfl: 4    loratadine (CLARITIN) 10 mg tablet, Take 1 tablet by mouth daily as needed, Disp: , Rfl:     LORazepam (ATIVAN) 0 5 mg tablet, Take 1 tablet by mouth 3 (three) times a day, Disp: , Rfl:     valACYclovir (VALTREX) 500 mg tablet, TAKE 1 TABLET DAILY, Disp: , Rfl: 3    amoxicillin (AMOXIL) 500 mg capsule, TAKE 1 CAPSULE 3 TIMES A DAY, Disp: , Rfl: 0  No Known Allergies  Vitals:    01/24/18 1403   BP: 124/84   Pulse: 92   Temp: 98 2 °F (36 8 °C)       Physical Exam   Constitutional: She appears well-developed  HENT:   Head: Normocephalic  Eyes: EOM are normal  Pupils are equal, round, and reactive to light  Neck: Normal range of motion  Tracheal deviation present  There is a palpable left neck mass  This is 6 cm in size  Cardiovascular: Normal rate, regular rhythm and normal heart sounds  Pulmonary/Chest: Effort normal and breath sounds normal    Abdominal: Soft  Musculoskeletal: Normal range of motion  Lymphadenopathy:   No cervical, axillary, or inguinal adenopathy bilaterally  Neurological: She is alert  Skin: Skin is warm  Psychiatric: She has a normal mood and affect  Her behavior is normal  Judgment normal        Pathology:      Labs:      Imaging  Cta Head And Neck With And Without Contrast    Result Date: 1/16/2018  Narrative: CTA NECK AND BRAIN WITH AND WITHOUT CONTRAST INDICATION: eval for nystagmus, H/A, Hx hemangiomas  History taken directly from the electronic ordering system  COMPARISON:   None  TECHNIQUE:  Routine CT imaging of the Brain without contrast   Post contrast imaging was performed after administration of iodinated contrast through the neck and brain  Post contrast axial 0 625 mm images timed to opacify the arterial system  3D rendering was performed on an independent workstation     MIP reconstructions performed  Coronal reconstructions were performed of the noncontrast portion of the brain  Radiation dose length product (DLP) for this visit:  1392 19 mGy-cm   This examination, like all CT scans performed in the [facility], was performed utilizing techniques to minimize radiation dose exposure, including the use of iterative reconstruction and automated exposure control  IV Contrast:  85 mL of iohexol (OMNIPAQUE)     IMAGE QUALITY:   Diagnostic FINDINGS: NONCONTRAST BRAIN PARENCHYMA:  No intracranial mass, mass effect or midline shift  No acute intracranial hemorrhage  No CT signs of acute infarction  VENTRICLES AND EXTRA-AXIAL SPACES:  Normal for patient's age  VISUALIZED ORBITS AND PARANASAL SINUSES:  Unremarkable  CALVARIUM AND EXTRACRANIAL SOFT TISSUES:   Normal  CERVICAL VASCULATURE AORTIC ARCH AND GREAT VESSELS:  Normal aortic arch and great vessel origins  Normal visualized subclavian vessels  RIGHT VERTEBRAL ARTERY CERVICAL SEGMENT:  Normal origin  The vessel is normal in caliber throughout the neck  LEFT VERTEBRAL ARTERY CERVICAL SEGMENT:  Normal origin  The vessel is normal in caliber throughout the neck  RIGHT EXTRACRANIAL CAROTID SEGMENT:  Normal caliber common carotid artery  Normal bifurcation and cervical internal carotid artery  No stenosis or dissection  LEFT EXTRACRANIAL CAROTID SEGMENT:  Normal caliber common carotid artery  Normal bifurcation and cervical internal carotid artery  No stenosis or dissection  NASCET criteria was used to determine the degree of internal carotid artery diameter stenosis  INTRACRANIAL VASCULATURE INTERNAL CAROTID ARTERIES:  Normal enhancement of the intracranial portions of the internal carotid arteries  Normal ophthalmic artery origins  Normal ICA terminus  ANTERIOR CIRCULATION:  Symmetric A1 segments and anterior cerebral arteries with normal enhancement  Normal anterior communicating artery   MIDDLE CEREBRAL ARTERY CIRCULATION:  M1 segment and middle cerebral artery branches demonstrate normal enhancement bilaterally  DISTAL VERTEBRAL ARTERIES:  Normal distal vertebral arteries  Posterior inferior cerebellar artery origins are normal  Normal vertebral basilar junction  BASILAR ARTERY:  Basilar artery is normal in caliber  Normal superior cerebellar arteries  POSTERIOR CEREBRAL ARTERIES: Both posterior cerebral arteries arises from the basilar tip  Both arteries demonstrate normal flow-related enhancement  Normal posterior communicating arteries  DURAL VENOUS SINUSES:  Normal  NON VASCULAR ANATOMY BONY STRUCTURES:  Mild erosive changes along the left lateral aspect of C6 is identified could possibly related to the mass  SOFT TISSUES OF THE NECK:  There is a large 4 2 x 4 5 x 5 7 cm soft tissue mass/neoplasm identified along the left medial supraclavicular region causing mass effect on the left thyroid gland and compressing the trachea  The mass displaces the common carotid artery and internal jugular vein anteriorly  The mass is anterior to the left vertebral artery  Differential includes neurogenic tumor, metastatic disease or other etiology  Please follow-up with contrast enhanced neck MRI  Additionally, there is  prominent prevascular tissue which although could be residual thymus, I cannot exclude adenopathy  Impression: 1  No evidence of dissection, occlusion, aneurysm or significant stenosis  2  Large 4 2 x 4 5 x 5 7 cm soft tissue mass/neoplasm identified along the left medial supraclavicular region causing mass effect on the left thyroid gland and compressing the trachea  The mass displaces the common carotid artery and internal jugular vein anteriorly  The mass is anterior to the left vertebral artery  Differential includes neurogenic tumor, metastatic disease or other etiology   Mild erosive changes along the left anterolateral aspect of C6 vertebral body is identified could possibly related to the mass or degenerative change  Please follow-up with contrast enhanced neck MRI  Workstation performed: [id number]     Mri Soft Tissue Neck Wo And W Contrast    Result Date: 1/18/2018  Narrative: MRI OF THE SOFT TISSUES OF THE NECK - WITH AND WITHOUT CONTRAST INDICATION:  R22 1: Localized swelling, mass and lump, neck  History taken directly from the electronic ordering system  Left-sided neck mass COMPARISON:  1/16/2018 TECHNIQUE:  Sagittal T1, axial T2, axial inversion recovery or fat suppressed T2 and axial T1 precontrast   Axial and coronal T1 postcontrast with fat suppression  IV Contrast:  10 mL of gadobutrol injection (MULTI-DOSE) IMAGE QUALITY:  Diagnostic  FINDINGS: VISUALIZED BRAIN PARENCHYMA:  Normal  VISUALIZED ORBITS AND PARANASAL SINUSES:  Normal  NASAL CAVITY AND NASOPHARYNX:  Normal  SUPRAHYOID NECK:  Normal oral cavity, tongue base, tonsillar fossa and epiglottis  Marked expansion of the parapharyngeal fat pads bilaterally with slight mass effect on the pterygoid muscles which are mildly displaced anteriorly  There is also asymmetric lipomatous hypertrophy of the submandibular fat pads left greater than right  INFRAHYOID NECK:  Aryepiglottic folds, laryngeal tissues, and piriform sinuses are normal   There is a T1 hypointense bilobed ovoid mass in the left supraclavicular fossa, displacing the trachea and left lobe of the thyroid gland towards the right  There is associated tracheal narrowing, the narrowest transverse cross-sectional diameter of the trachea is approximately 0 5 cm on image 42, series 3 with an AP diameter of approximately 1 6 cm at this level  The left paratracheal/supraclavicular mass is slightly heterogeneous with a few internal T1 hyperintense elements  It demonstrates exuberant enhancement and has a bilobed morphology, the more posterior margin extending to the left neural foramen at C6-C7    The posterior bilobed configuration appears to be associated with the exiting nerve root at this level, perhaps best seen on image 39, series 9 but well seen on other images as well including image 39 of series 6  The tumor enhances exuberantly and has T2 hyperintensity, characteristics of a nerve sheath tumor  There are smooth margins of the mass  In aggregate the tumor measures approximately 6 7 cm maximal craniocaudal dimension x 5 1 cm maximal AP dimension x 4 2 cm maximal transverse dimension  THYROID GLAND:   The left lobe of the thyroid gland is effaced and compressed, displaced anteriorly into the right  PAROTID AND SUBMANDIBULAR GLANDS:  Normal  LYMPH NODES:  No pathologic or enlarged adenopathy  VASCULAR STRUCTURES:  Grossly normal flow voids of the cervical vasculature  THORACIC INLET: Lung apices and upper mediastinum are grossly unremarkable  CERVICAL SPINE:  Normal alignment of the cervical spine  Normal marrow signal   No gross evidence of degenerative disease  Cervical cord is grossly normal in signal      Impression: Large, 6 7 cm exuberantly enhancing solid bilobed mass in the left paratracheal/supraclavicular region with the posterior margin of the tumor associated with the left C6-C7 neural foramen, 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  I have discussed this previously with Dr Yumiko Meredith  Given that this is symptomatic, and is probably a schwannoma, I recommended excision    I did discuss that if these do have a risk of malignant degeneration and given her young age I would recommend surgical excision  The risks were explained including bleeding, infection, recurrence, need for further surgery, wound complications, adjacent organ injury, neurovascular injury, MI, stroke, DVT, pulmonary embolism, and death  Informed consent was obtained  We will coordinate this with Dr Angel Lara and we will schedule this at our earliest mutual convenience  She and her mother are agreeable to this  All their questions were answered

## 2018-01-25 ENCOUNTER — LAB REQUISITION (OUTPATIENT)
Dept: LAB | Facility: HOSPITAL | Age: 23
End: 2018-01-25
Payer: COMMERCIAL

## 2018-01-25 ENCOUNTER — TRANSCRIBE ORDERS (OUTPATIENT)
Dept: LAB | Facility: HOSPITAL | Age: 23
End: 2018-01-25

## 2018-01-25 ENCOUNTER — APPOINTMENT (OUTPATIENT)
Dept: LAB | Facility: HOSPITAL | Age: 23
End: 2018-01-25
Attending: SURGERY
Payer: COMMERCIAL

## 2018-01-25 ENCOUNTER — HOSPITAL ENCOUNTER (OUTPATIENT)
Dept: RADIOLOGY | Facility: HOSPITAL | Age: 23
Discharge: HOME | End: 2018-01-25
Attending: SURGERY
Payer: COMMERCIAL

## 2018-01-25 DIAGNOSIS — R22.1 NECK MASS: ICD-10-CM

## 2018-01-25 DIAGNOSIS — R22.1 NECK MASS: Primary | ICD-10-CM

## 2018-01-25 DIAGNOSIS — R22.1 LOCALIZED SWELLING, MASS OR LUMP OF NECK: ICD-10-CM

## 2018-01-25 LAB
ABO GROUP BLD: NORMAL
APTT PPP: 28 SECONDS (ref 23–35)
BLD GP AB SCN SERPL QL: NEGATIVE
INR PPP: 1 (ref 0.86–1.16)
PROTHROMBIN TIME: 13.2 SECONDS (ref 12.1–14.4)
RH BLD: POSITIVE
SPECIMEN EXPIRATION DATE: NORMAL
T4 FREE SERPL-MCNC: 1.08 NG/DL (ref 0.76–1.46)
TSH SERPL DL<=0.05 MIU/L-ACNC: 2.53 UIU/ML

## 2018-01-25 PROCEDURE — 84439 ASSAY OF FREE THYROXINE: CPT | Performed by: SURGERY

## 2018-01-25 PROCEDURE — 71046 X-RAY EXAM CHEST 2 VIEWS: CPT

## 2018-01-25 PROCEDURE — 85730 THROMBOPLASTIN TIME PARTIAL: CPT | Performed by: SURGERY

## 2018-01-25 PROCEDURE — 85610 PROTHROMBIN TIME: CPT | Performed by: SURGERY

## 2018-01-25 PROCEDURE — 36415 COLL VENOUS BLD VENIPUNCTURE: CPT | Performed by: SURGERY

## 2018-01-25 PROCEDURE — 84443 ASSAY THYROID STIM HORMONE: CPT

## 2018-01-25 NOTE — PROGRESS NOTES
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, had a trach postop, that was removed few years later  More recently presented to the hospital with headaches, dizziness  That prompted workup which demonstrated a left neck mass  On MRI of the neck as well as CTA of the neck this appears to be a mass contiguous with the exiting C6, C7 nerve roots i e  a schwannoma  It displaces the carotid anterior medially, the jugular anterior laterally, the vertebral artery posteriorly, and is in continuity with the exiting nerve roots  Plan is for surgical excision (anticipated subtotal resection) as co-surgeon with Dr Jus Trevino  We will likely be able to offer focused radiation/radiosurgery as adjuvant therapy to residual to prevent recurrence, will be handled through [facility]    Risks personally reviewed with patient and her mother:  Stroke from vessel injury which could result in speech difficulty, right-sided hai paresis/plegia, or brainstem injury, dysphagia, vagal nerve injury, and exiting cervical nerve root injury causing weakness, numbness, or dysesthetic pain  Alternatives reviewed  Agreed to proceed  Consent obtained  EQ5D5L:  111 3 4, or 0 661, VA S 60-70, KPS 90, ECOG 0        Subjective:      Patient ID: Shelby Judge is a 25 y o  female  Recent hospitalization for headache, dizziness  Imaging demonstrated left neck mass-incidental     Denies left arm weakness, numbness, tingling  Some dysphagia           Current Outpatient Prescriptions on File Prior to Visit   Medication Sig Dispense Refill    buPROPion [facility] SR) 150 mg 12 hr tablet Take 1 tablet by mouth 2 (two) times a day      ethynodiol-ethinyl estradiol (Daniel Level 1/35) 1-35 MG-MCG per tablet Take 1 tablet by mouth daily      FLUoxetine (PROzac) 20 MG tablet TAKE 1 TABLET 2x daily  11    loratadine (CLARITIN) 10 mg tablet Take 1 tablet by mouth daily as needed      LORazepam (ATIVAN) 0 5 mg tablet Take 1 tablet by mouth every 8 (eight) hours as needed        valACYclovir (VALTREX) 500 mg tablet TAKE 1 TABLET DAILY PRN  3    amoxicillin (AMOXIL) 500 mg capsule TAKE 1 CAPSULE 3 TIMES A DAY  0    [DISCONTINUED] [facility] 1/35 1-35 MG-MCG per tablet 1 ORAL TAKE AS DIRECTED  4     No current facility-administered medications on file prior to visit  No Known Allergies      Past Medical History:   Diagnosis Date    Asthma     Sexually transmitted disease (STD)     Sleep apnea      Past Surgical History:   Procedure Laterality Date    NECK SURGERY      SURGERY OF LIP      TONSILLECTOMY AND ADENOIDECTOMY      TRACHEOSTOMY       Social History   Substance Use Topics    Smoking status: Former Smoker    Smokeless tobacco: Never Used      Comment: socially, former    Alcohol use Yes      Comment: occasional     Family History   Problem Relation Age of Onset    Hemochromatosis Father     Lung cancer Maternal Grandfather     Bone cancer Paternal Grandmother     Lung cancer Paternal Grandfather          The following portions of the patient's history were reviewed and updated as appropriate: allergies, current medications, past family history, past medical history, past social history and past surgical history  Review of Systems      Constitutional: Positive for unexpected weight change (gained 30 lbs past year)  HENT: Positive for tinnitus (occasional, bilateral, pulsatile) and trouble swallowing (does not have trouble swallowing but can feel tumor when swallowing)  Eyes: Positive for photophobia (with headaches), pain (with headaches) and visual disturbance (blurred with headaches)  Respiratory: Positive for shortness of breath (on exertion)      Gastrointestinal: Positive for abdominal pain (occasional), constipation and nausea (frequent severe nausea, lexie to endocrinologist and will f/u with gastro)  Endocrine:        Always hot, gets hot flashes frequently   Genitourinary:        Occasional stress incontinence   Musculoskeletal: Negative  Skin: Negative  Allergic/Immunologic:        Seasonal   Neurological: Positive for dizziness, weakness, light-headedness and headaches (5/10 frontal, has had one for a couple days, constant, aleve helped but did not relieve, aching pain with intermittent throbbing)  Negative for seizures, syncope, speech difficulty and numbness  Hematological: Negative  Psychiatric/Behavioral: Positive for decreased concentration and sleep disturbance (severe fatigue, difficulty sleeping, napping a lot)  Negative for confusion  The patient is nervous/anxious  Objective:     Physical Exam   Constitutional: She is oriented to person, place, and time  She appears well-developed and well-nourished  HENT:   Head: Normocephalic  Neck:       Cardiovascular: Normal rate and regular rhythm  Pulmonary/Chest: Breath sounds normal    Abdominal: Soft  Neurological: She is alert and oriented to person, place, and time  No cranial nerve deficit  GCS eye subscore is 4  GCS verbal subscore is 5  GCS motor subscore is 6  Skin: Skin is warm  Psychiatric: Her behavior is normal          Neurologic Exam     Mental Status   Oriented to person, place, and time  Motor Exam     Strength   Right deltoid: 5/5  Left deltoid: 5/5  Left biceps: 5/5  Left triceps: 5/5  Left wrist extension: 5/5  Left interossei: 5/5    Sensory Exam   Light touch normal    Pinprick normal        MEDICAL DECISION MAKING    Imaging Studies:     Cta Head And Neck With And Without Contrast    Result Date: 1/16/2018  Narrative: CTA NECK AND BRAIN WITH AND WITHOUT CONTRAST INDICATION: eval for nystagmus, H/A, Hx hemangiomas  History taken directly from the electronic ordering system  COMPARISON:   None   TECHNIQUE:  Routine CT imaging of the Brain without contrast   Post contrast imaging was performed after administration of iodinated contrast through the neck and brain  Post contrast axial 0 625 mm images timed to opacify the arterial system  3D rendering was performed on an independent workstation  MIP reconstructions performed  Coronal reconstructions were performed of the noncontrast portion of the brain  Radiation dose length product (DLP) for this visit:  1392 19 mGy-cm   This examination, like all CT scans performed in the [facility], was performed utilizing techniques to minimize radiation dose exposure, including the use of iterative reconstruction and automated exposure control  IV Contrast:  85 mL of iohexol (OMNIPAQUE)     IMAGE QUALITY:   Diagnostic FINDINGS: NONCONTRAST BRAIN PARENCHYMA:  No intracranial mass, mass effect or midline shift  No acute intracranial hemorrhage  No CT signs of acute infarction  VENTRICLES AND EXTRA-AXIAL SPACES:  Normal for patient's age  VISUALIZED ORBITS AND PARANASAL SINUSES:  Unremarkable  CALVARIUM AND EXTRACRANIAL SOFT TISSUES:   Normal  CERVICAL VASCULATURE AORTIC ARCH AND GREAT VESSELS:  Normal aortic arch and great vessel origins  Normal visualized subclavian vessels  RIGHT VERTEBRAL ARTERY CERVICAL SEGMENT:  Normal origin  The vessel is normal in caliber throughout the neck  LEFT VERTEBRAL ARTERY CERVICAL SEGMENT:  Normal origin  The vessel is normal in caliber throughout the neck  RIGHT EXTRACRANIAL CAROTID SEGMENT:  Normal caliber common carotid artery  Normal bifurcation and cervical internal carotid artery  No stenosis or dissection  LEFT EXTRACRANIAL CAROTID SEGMENT:  Normal caliber common carotid artery  Normal bifurcation and cervical internal carotid artery  No stenosis or dissection  NASCET criteria was used to determine the degree of internal carotid artery diameter stenosis   INTRACRANIAL VASCULATURE INTERNAL CAROTID ARTERIES:  Normal enhancement of the intracranial portions of the internal carotid arteries  Normal ophthalmic artery origins  Normal ICA terminus  ANTERIOR CIRCULATION:  Symmetric A1 segments and anterior cerebral arteries with normal enhancement  Normal anterior communicating artery  MIDDLE CEREBRAL ARTERY CIRCULATION:  M1 segment and middle cerebral artery branches demonstrate normal enhancement bilaterally  DISTAL VERTEBRAL ARTERIES:  Normal distal vertebral arteries  Posterior inferior cerebellar artery origins are normal  Normal vertebral basilar junction  BASILAR ARTERY:  Basilar artery is normal in caliber  Normal superior cerebellar arteries  POSTERIOR CEREBRAL ARTERIES: Both posterior cerebral arteries arises from the basilar tip  Both arteries demonstrate normal flow-related enhancement  Normal posterior communicating arteries  DURAL VENOUS SINUSES:  Normal  NON VASCULAR ANATOMY BONY STRUCTURES:  Mild erosive changes along the left lateral aspect of C6 is identified could possibly related to the mass  SOFT TISSUES OF THE NECK:  There is a large 4 2 x 4 5 x 5 7 cm soft tissue mass/neoplasm identified along the left medial supraclavicular region causing mass effect on the left thyroid gland and compressing the trachea  The mass displaces the common carotid artery and internal jugular vein anteriorly  The mass is anterior to the left vertebral artery  Differential includes neurogenic tumor, metastatic disease or other etiology  Please follow-up with contrast enhanced neck MRI  Additionally, there is  prominent prevascular tissue which although could be residual thymus, I cannot exclude adenopathy  Impression: 1  No evidence of dissection, occlusion, aneurysm or significant stenosis  2  Large 4 2 x 4 5 x 5 7 cm soft tissue mass/neoplasm identified along the left medial supraclavicular region causing mass effect on the left thyroid gland and compressing the trachea  The mass displaces the common carotid artery and internal jugular vein anteriorly   The mass is anterior to the left vertebral artery  Differential includes neurogenic tumor, metastatic disease or other etiology  Mild erosive changes along the left anterolateral aspect of C6 vertebral body is identified could possibly related to the mass or degenerative change  Please follow-up with contrast enhanced neck MRI  Workstation performed: [id number]     Mri Soft Tissue Neck Wo And W Contrast    Result Date: 1/18/2018  Narrative: MRI OF THE SOFT TISSUES OF THE NECK - WITH AND WITHOUT CONTRAST INDICATION:  R22 1: Localized swelling, mass and lump, neck  History taken directly from the electronic ordering system  Left-sided neck mass COMPARISON:  1/16/2018 TECHNIQUE:  Sagittal T1, axial T2, axial inversion recovery or fat suppressed T2 and axial T1 precontrast   Axial and coronal T1 postcontrast with fat suppression  IV Contrast:  10 mL of gadobutrol injection (MULTI-DOSE) IMAGE QUALITY:  Diagnostic  FINDINGS: VISUALIZED BRAIN PARENCHYMA:  Normal  VISUALIZED ORBITS AND PARANASAL SINUSES:  Normal  NASAL CAVITY AND NASOPHARYNX:  Normal  SUPRAHYOID NECK:  Normal oral cavity, tongue base, tonsillar fossa and epiglottis  Marked expansion of the parapharyngeal fat pads bilaterally with slight mass effect on the pterygoid muscles which are mildly displaced anteriorly  There is also asymmetric lipomatous hypertrophy of the submandibular fat pads left greater than right  INFRAHYOID NECK:  Aryepiglottic folds, laryngeal tissues, and piriform sinuses are normal   There is a T1 hypointense bilobed ovoid mass in the left supraclavicular fossa, displacing the trachea and left lobe of the thyroid gland towards the right  There is associated tracheal narrowing, the narrowest transverse cross-sectional diameter of the trachea is approximately 0 5 cm on image 42, series 3 with an AP diameter of approximately 1 6 cm at this level    The left paratracheal/supraclavicular mass is slightly heterogeneous with a few internal T1 hyperintense elements  It demonstrates exuberant enhancement and has a bilobed morphology, the more posterior margin extending to the left neural foramen at C6-C7  The posterior bilobed configuration appears to be associated with the exiting nerve root at this level, perhaps best seen on image 39, series 9 but well seen on other images as well including image 39 of series 6  The tumor enhances exuberantly and has T2 hyperintensity, characteristics of a nerve sheath tumor  There are smooth margins of the mass  In aggregate the tumor measures approximately 6 7 cm maximal craniocaudal dimension x 5 1 cm maximal AP dimension x 4 2 cm maximal transverse dimension  THYROID GLAND:   The left lobe of the thyroid gland is effaced and compressed, displaced anteriorly into the right  PAROTID AND SUBMANDIBULAR GLANDS:  Normal  LYMPH NODES:  No pathologic or enlarged adenopathy  VASCULAR STRUCTURES:  Grossly normal flow voids of the cervical vasculature  THORACIC INLET: Lung apices and upper mediastinum are grossly unremarkable  CERVICAL SPINE:  Normal alignment of the cervical spine  Normal marrow signal   No gross evidence of degenerative disease  Cervical cord is grossly normal in signal      Impression: Large, 6 7 cm exuberantly enhancing solid bilobed mass in the left paratracheal/supraclavicular region with the posterior margin of the tumor associated with the left C6-C7 neural foramen, 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 Workstation performed: [id number]       I have personally reviewed pertinent reports     and I have personally reviewed pertinent films in PACS    Dr Linda Osullivan office note from 1/24/18 personally reviewed, summarized in discussion

## 2018-01-26 ENCOUNTER — OFFICE VISIT (OUTPATIENT)
Dept: NEUROSURGERY | Facility: CLINIC | Age: 23
End: 2018-01-26
Payer: COMMERCIAL

## 2018-01-26 VITALS
HEIGHT: 68 IN | TEMPERATURE: 98 F | RESPIRATION RATE: 16 BRPM | SYSTOLIC BLOOD PRESSURE: 140 MMHG | HEART RATE: 92 BPM | BODY MASS INDEX: 37.13 KG/M2 | DIASTOLIC BLOOD PRESSURE: 103 MMHG | WEIGHT: 245 LBS

## 2018-01-26 DIAGNOSIS — D49.2 NERVE SHEATH TUMOR: ICD-10-CM

## 2018-01-26 DIAGNOSIS — D18.00 HEMANGIOMA: Primary | ICD-10-CM

## 2018-01-26 DIAGNOSIS — R22.1 NECK MASS: Primary | ICD-10-CM

## 2018-01-26 PROCEDURE — 99205 OFFICE O/P NEW HI 60 MIN: CPT | Performed by: NEUROLOGICAL SURGERY

## 2018-01-29 ENCOUNTER — DOCUMENTATION (OUTPATIENT)
Dept: FAMILY MEDICINE CLINIC | Facility: CLINIC | Age: 23
End: 2018-01-29

## 2018-01-30 PROBLEM — D49.2 NERVE SHEATH TUMOR: Status: ACTIVE | Noted: 2018-01-30

## 2018-02-07 DIAGNOSIS — R11.0 NAUSEA: Primary | ICD-10-CM

## 2018-02-09 ENCOUNTER — ANESTHESIA EVENT (OUTPATIENT)
Dept: PERIOP | Facility: HOSPITAL | Age: 23
DRG: 465 | End: 2018-02-09
Payer: COMMERCIAL

## 2018-02-09 NOTE — PRE-PROCEDURE INSTRUCTIONS
Pre-Surgery Instructions:   Medication Instructions    ethynodiol-ethinyl estradiol Tavares Saucedo 1/35) 1-35 MG-MCG per tablet Instructed patient per Anesthesia Guidelines   FLUoxetine (PROzac) 20 MG tablet Instructed patient per Anesthesia Guidelines   loratadine (CLARITIN) 10 mg tablet Instructed patient per Anesthesia Guidelines   LORazepam (ATIVAN) 0 5 mg tablet Instructed patient per Anesthesia Guidelines   valACYclovir (VALTREX) 500 mg tablet Instructed patient per Anesthesia Guidelines

## 2018-02-14 ENCOUNTER — LAB (OUTPATIENT)
Dept: LAB | Facility: HOSPITAL | Age: 23
DRG: 465 | End: 2018-02-14
Attending: NEUROLOGICAL SURGERY
Payer: COMMERCIAL

## 2018-02-14 ENCOUNTER — OFFICE VISIT (OUTPATIENT)
Dept: OBGYN CLINIC | Facility: CLINIC | Age: 23
End: 2018-02-14
Payer: COMMERCIAL

## 2018-02-14 VITALS
DIASTOLIC BLOOD PRESSURE: 88 MMHG | BODY MASS INDEX: 37.74 KG/M2 | HEIGHT: 68 IN | WEIGHT: 249 LBS | SYSTOLIC BLOOD PRESSURE: 142 MMHG

## 2018-02-14 DIAGNOSIS — R35.0 URINE FREQUENCY: ICD-10-CM

## 2018-02-14 DIAGNOSIS — Z01.419 ENCNTR FOR GYN EXAM (GENERAL) (ROUTINE) W/O ABN FINDINGS: Primary | ICD-10-CM

## 2018-02-14 DIAGNOSIS — D18.00 HEMANGIOMA: ICD-10-CM

## 2018-02-14 DIAGNOSIS — Z11.3 SCREEN FOR STD (SEXUALLY TRANSMITTED DISEASE): ICD-10-CM

## 2018-02-14 LAB
BACTERIA UR QL AUTO: ABNORMAL /HPF
BILIRUB UR QL STRIP: NEGATIVE
CLARITY UR: CLEAR
COLOR UR: YELLOW
EST. AVERAGE GLUCOSE BLD GHB EST-MCNC: 91 MG/DL
GLUCOSE UR STRIP-MCNC: NEGATIVE MG/DL
HBA1C MFR BLD: 4.8 % (ref 4.2–6.3)
HGB UR QL STRIP.AUTO: NEGATIVE
KETONES UR STRIP-MCNC: NEGATIVE MG/DL
LEUKOCYTE ESTERASE UR QL STRIP: NEGATIVE
NITRITE UR QL STRIP: NEGATIVE
NON-SQ EPI CELLS URNS QL MICRO: ABNORMAL /HPF
PH UR STRIP.AUTO: 6 [PH] (ref 4.5–8)
PROT UR STRIP-MCNC: ABNORMAL MG/DL
RBC #/AREA URNS AUTO: ABNORMAL /HPF
SP GR UR STRIP.AUTO: 1.03 (ref 1–1.03)
UROBILINOGEN UR QL STRIP.AUTO: 0.2 E.U./DL
WBC #/AREA URNS AUTO: ABNORMAL /HPF

## 2018-02-14 PROCEDURE — 81001 URINALYSIS AUTO W/SCOPE: CPT | Performed by: PHYSICIAN ASSISTANT

## 2018-02-14 PROCEDURE — 36415 COLL VENOUS BLD VENIPUNCTURE: CPT

## 2018-02-14 PROCEDURE — 99395 PREV VISIT EST AGE 18-39: CPT | Performed by: PHYSICIAN ASSISTANT

## 2018-02-14 PROCEDURE — 83036 HEMOGLOBIN GLYCOSYLATED A1C: CPT

## 2018-02-14 PROCEDURE — 87491 CHLMYD TRACH DNA AMP PROBE: CPT | Performed by: PHYSICIAN ASSISTANT

## 2018-02-14 PROCEDURE — 87591 N.GONORRHOEAE DNA AMP PROB: CPT | Performed by: PHYSICIAN ASSISTANT

## 2018-02-14 PROCEDURE — 87147 CULTURE TYPE IMMUNOLOGIC: CPT | Performed by: PHYSICIAN ASSISTANT

## 2018-02-14 PROCEDURE — 87086 URINE CULTURE/COLONY COUNT: CPT | Performed by: PHYSICIAN ASSISTANT

## 2018-02-14 RX ORDER — VALACYCLOVIR HYDROCHLORIDE 500 MG/1
500 TABLET, FILM COATED ORAL DAILY
Qty: 90 TABLET | Refills: 3 | Status: SHIPPED | OUTPATIENT
Start: 2018-02-14 | End: 2019-07-19

## 2018-02-14 RX ORDER — ETHYNODIOL DIACETATE AND ETHINYL ESTRADIOL 1 MG-35MCG
1 KIT ORAL DAILY
Qty: 90 TABLET | Refills: 3 | Status: SHIPPED | OUTPATIENT
Start: 2018-02-14 | End: 2018-03-01 | Stop reason: ALTCHOICE

## 2018-02-14 NOTE — PROGRESS NOTES
[facility]  1995    CC:  Yearly exam    S:  26 y o  female here for yearly exam  Her cycles are regular, not heavy or crampy  She is sexually active  She uses [employer] 1/35 for contraception  She does not request STD testing today  She is engaged to be   She will be undergoing surgery for a neck mass recently identified in the ER  She is now working as a medical assistant at [employer]  She notes some stress urinary incontinence; we discussed checking urinalysis and urine culture and if these are negative referring her to urogyn or urology  She does get headaches about once a week  We discussed this  She is going to keep a diary to see whether these are occurring on only active pills, only placebo pills, or regardless of pill  She will return in 3 months to discuss this  Last Pap 2017 neg      Current Outpatient Prescriptions:     ethynodiol-ethinyl estradiol [facility] 1/35) 1-35 MG-MCG per tablet, Take 1 tablet by mouth daily, Disp: , Rfl:     FLUoxetine (PROzac) 20 MG tablet, TAKE 1 TABLET 2x daily, Disp: , Rfl: 11    loratadine (CLARITIN) 10 mg tablet, Take 1 tablet by mouth daily as needed, Disp: , Rfl:     LORazepam (ATIVAN) 0 5 mg tablet, Take 1 tablet by mouth every 8 (eight) hours as needed  , Disp: , Rfl:     valACYclovir (VALTREX) 500 mg tablet, TAKE 1 TABLET DAILY PRN, Disp: , Rfl: 3  Social History     Social History    Marital status: Single     Spouse name: N/A    Number of children: N/A    Years of education: N/A     Occupational History    Not on file       Social History Main Topics    Smoking status: Former Smoker    Smokeless tobacco: Never Used    Alcohol use 2 4 oz/week     4 Glasses of wine per week      Comment: occasional    Drug use: No    Sexual activity: Yes     Birth control/ protection: OCP     Other Topics Concern    Not on file     Social History Narrative    No narrative on file     Family History   Problem Relation Age of Onset  Hemochromatosis Father     Lung cancer Maternal Grandfather     Bone cancer Paternal Grandmother     Lung cancer Paternal Grandfather     No Known Problems Mother      Past Medical History:   Diagnosis Date    Hemangioma of lip     at [de-identified] old with chemo, surgery and tracheostomy    New onset of headaches     Sexually transmitted disease (STD)          O:  Blood pressure 142/88, height 5' 7 72" (1 72 m), weight 113 kg (249 lb), last menstrual period 01/30/2018  Patient appears well and is not in distress  Breasts are symmetrical without mass, tenderness, nipple discharge, skin changes or adenopathy  Abdomen is soft and nontender without masses  External genitals are normal without lesions or rashes  Vagina is normal without discharge or bleeding  Cervix is normal without discharge or lesion  Uterus is normal, mobile, nontender without palpable mass  Adnexa are normal, nontender, without palpable mass  A:  Yearly exam  Stress urinary incontinence  Headaches  P:  Rx Kelnor refilled  RTO in 3 months to discuss headaches  Check urinalysis, urine C&S  RTO one year for yearly exam or sooner as needed

## 2018-02-15 LAB
CHLAMYDIA DNA CVX QL NAA+PROBE: NORMAL
N GONORRHOEA DNA GENITAL QL NAA+PROBE: NORMAL

## 2018-02-16 ENCOUNTER — TELEPHONE (OUTPATIENT)
Dept: OBGYN CLINIC | Facility: CLINIC | Age: 23
End: 2018-02-16

## 2018-02-16 DIAGNOSIS — R32 URINARY INCONTINENCE, UNSPECIFIED TYPE: Primary | ICD-10-CM

## 2018-02-16 LAB — BACTERIA UR CULT: NORMAL

## 2018-02-16 NOTE — TELEPHONE ENCOUNTER
----- Message from Lia Sweeney PA-C sent at 2/16/2018  2:14 PM EST -----  Please call the patient regarding her urine testing  It is negative  I would suggest referral to urogyn for stress incontinence

## 2018-02-21 ENCOUNTER — TELEPHONE (OUTPATIENT)
Dept: NEUROSURGERY | Facility: CLINIC | Age: 23
End: 2018-02-21

## 2018-02-21 ENCOUNTER — DOCUMENTATION (OUTPATIENT)
Dept: NEUROSURGERY | Facility: CLINIC | Age: 23
End: 2018-02-21

## 2018-02-21 NOTE — ANESTHESIA PREPROCEDURE EVALUATION
Review of Systems/Medical History  Patient summary reviewed  Chart reviewed  No history of anesthetic complications     Cardiovascular  Negative cardio ROS Exercise tolerance: good,     Pulmonary  Asthma: , Sleep apnea ,        GI/Hepatic  Negative GI/hepatic ROS          Negative  ROS        Endo/Other     GYN  Negative gynecology ROS          Hematology  Negative hematology ROS      Musculoskeletal  Negative musculoskeletal ROS        Neurology    Headaches,   Comment: Neck mass Psychology   Depression ,              Physical Exam    Airway    Mallampati score: I  TM Distance: >3 FB  Neck ROM: full     Dental   No notable dental hx     Cardiovascular  Comment: Negative ROS, Cardiovascular exam normal    Pulmonary  Pulmonary exam normal     Other Findings        Anesthesia Plan  ASA Score- 2     Anesthesia Type- general with ASA Monitors  Additional Monitors: arterial line  Airway Plan: ETT  Comment: I, Dr Diamante Vick, the attending physician, has personally seen and evaluated the patient prior to anesthetic care  I have reviewed the pre-anesthetic record, and other medical records if appropriate to the anesthetic care  Risks and benefits discussed with patient; patient consented and agrees to proceed  If a CRNA is involved in the case, I have reviewed the CRNA assessment, if present, and agree  The patient is in a suitable condition to proceed with my formulated anesthetic plan        Plan Factors-Patient not instructed to abstain from smoking on day of procedure       Induction- awake intubation  Postoperative Plan-     Informed Consent- Anesthetic plan and risks discussed with patient

## 2018-02-21 NOTE — PROGRESS NOTES
Patient is scheduled for surgery on 2/22/18 for a excision of left neck mass  No record found on [facility]  Attached record found on [place] PDMP

## 2018-02-21 NOTE — TELEPHONE ENCOUNTER
Called patient to provide preop call  However, patient did not answer  Left a message  Provided my direct line

## 2018-02-22 ENCOUNTER — HOSPITAL ENCOUNTER (INPATIENT)
Facility: HOSPITAL | Age: 23
LOS: 1 days | Discharge: HOME HEALTH | DRG: 465 | End: 2018-02-23
Attending: SURGERY | Admitting: SURGERY
Payer: COMMERCIAL

## 2018-02-22 ENCOUNTER — APPOINTMENT (INPATIENT)
Dept: RADIOLOGY | Facility: HOSPITAL | Age: 23
DRG: 465 | End: 2018-02-22
Payer: COMMERCIAL

## 2018-02-22 ENCOUNTER — ANESTHESIA (OUTPATIENT)
Dept: PERIOP | Facility: HOSPITAL | Age: 23
DRG: 465 | End: 2018-02-22
Payer: COMMERCIAL

## 2018-02-22 DIAGNOSIS — R22.1 NECK MASS: ICD-10-CM

## 2018-02-22 LAB
ABO GROUP BLD: NORMAL
BLD GP AB SCN SERPL QL: NEGATIVE
EXT PREGNANCY TEST URINE: NEGATIVE
GLUCOSE SERPL-MCNC: 121 MG/DL (ref 65–140)
RH BLD: POSITIVE
SPECIMEN EXPIRATION DATE: NORMAL

## 2018-02-22 PROCEDURE — 81025 URINE PREGNANCY TEST: CPT | Performed by: SURGERY

## 2018-02-22 PROCEDURE — 21557 RESECT NECK THORAX TUMOR<5CM: CPT | Performed by: SURGERY

## 2018-02-22 PROCEDURE — 86900 BLOOD TYPING SEROLOGIC ABO: CPT | Performed by: SURGERY

## 2018-02-22 PROCEDURE — 88305 TISSUE EXAM BY PATHOLOGIST: CPT | Performed by: SURGERY

## 2018-02-22 PROCEDURE — 86901 BLOOD TYPING SEROLOGIC RH(D): CPT | Performed by: SURGERY

## 2018-02-22 PROCEDURE — 88341 IMHCHEM/IMCYTCHM EA ADD ANTB: CPT | Performed by: PATHOLOGY

## 2018-02-22 PROCEDURE — 86923 COMPATIBILITY TEST ELECTRIC: CPT

## 2018-02-22 PROCEDURE — 82948 REAGENT STRIP/BLOOD GLUCOSE: CPT

## 2018-02-22 PROCEDURE — 88307 TISSUE EXAM BY PATHOLOGIST: CPT | Performed by: SURGERY

## 2018-02-22 PROCEDURE — 86850 RBC ANTIBODY SCREEN: CPT | Performed by: SURGERY

## 2018-02-22 PROCEDURE — 88342 IMHCHEM/IMCYTCHM 1ST ANTB: CPT | Performed by: PATHOLOGY

## 2018-02-22 PROCEDURE — 07B20ZX EXCISION OF LEFT NECK LYMPHATIC, OPEN APPROACH, DIAGNOSTIC: ICD-10-PCS | Performed by: SURGERY

## 2018-02-22 PROCEDURE — 95940 IONM IN OPERATNG ROOM 15 MIN: CPT | Performed by: SURGERY

## 2018-02-22 PROCEDURE — 88307 TISSUE EXAM BY PATHOLOGIST: CPT | Performed by: PATHOLOGY

## 2018-02-22 PROCEDURE — 71045 X-RAY EXAM CHEST 1 VIEW: CPT

## 2018-02-22 PROCEDURE — 21557 RESECT NECK THORAX TUMOR<5CM: CPT | Performed by: NEUROLOGICAL SURGERY

## 2018-02-22 PROCEDURE — 88305 TISSUE EXAM BY PATHOLOGIST: CPT | Performed by: PATHOLOGY

## 2018-02-22 PROCEDURE — 88341 IMHCHEM/IMCYTCHM EA ADD ANTB: CPT | Performed by: SURGERY

## 2018-02-22 PROCEDURE — 88342 IMHCHEM/IMCYTCHM 1ST ANTB: CPT | Performed by: SURGERY

## 2018-02-22 PROCEDURE — 0JB50ZZ EXCISION OF LEFT NECK SUBCUTANEOUS TISSUE AND FASCIA, OPEN APPROACH: ICD-10-PCS | Performed by: SURGERY

## 2018-02-22 RX ORDER — ETHYNODIOL DIACETATE AND ETHINYL ESTRADIOL 1 MG-35MCG
1 KIT ORAL DAILY
Status: DISCONTINUED | OUTPATIENT
Start: 2018-02-22 | End: 2018-02-23 | Stop reason: HOSPADM

## 2018-02-22 RX ORDER — ONDANSETRON 2 MG/ML
4 INJECTION INTRAMUSCULAR; INTRAVENOUS ONCE AS NEEDED
Status: DISCONTINUED | OUTPATIENT
Start: 2018-02-22 | End: 2018-02-22 | Stop reason: HOSPADM

## 2018-02-22 RX ORDER — LABETALOL HYDROCHLORIDE 5 MG/ML
10 INJECTION, SOLUTION INTRAVENOUS
Status: DISCONTINUED | OUTPATIENT
Start: 2018-02-22 | End: 2018-02-22 | Stop reason: HOSPADM

## 2018-02-22 RX ORDER — FENTANYL CITRATE/PF 50 MCG/ML
25 SYRINGE (ML) INJECTION
Status: DISCONTINUED | OUTPATIENT
Start: 2018-02-22 | End: 2018-02-22 | Stop reason: HOSPADM

## 2018-02-22 RX ORDER — FLUOXETINE 20 MG/1
20 TABLET, FILM COATED ORAL DAILY
Status: DISCONTINUED | OUTPATIENT
Start: 2018-02-22 | End: 2018-02-22

## 2018-02-22 RX ORDER — ONDANSETRON 2 MG/ML
4 INJECTION INTRAMUSCULAR; INTRAVENOUS EVERY 6 HOURS PRN
Status: DISCONTINUED | OUTPATIENT
Start: 2018-02-22 | End: 2018-02-23 | Stop reason: HOSPADM

## 2018-02-22 RX ORDER — ALBUTEROL SULFATE 2.5 MG/3ML
2.5 SOLUTION RESPIRATORY (INHALATION) ONCE AS NEEDED
Status: DISCONTINUED | OUTPATIENT
Start: 2018-02-22 | End: 2018-02-22 | Stop reason: HOSPADM

## 2018-02-22 RX ORDER — CHLORHEXIDINE GLUCONATE 0.12 MG/ML
15 RINSE ORAL EVERY 12 HOURS SCHEDULED
Status: DISCONTINUED | OUTPATIENT
Start: 2018-02-22 | End: 2018-02-22 | Stop reason: HOSPADM

## 2018-02-22 RX ORDER — HYDROCODONE BITARTRATE AND ACETAMINOPHEN 5; 325 MG/1; MG/1
1 TABLET ORAL EVERY 6 HOURS PRN
Status: DISCONTINUED | OUTPATIENT
Start: 2018-02-22 | End: 2018-02-23 | Stop reason: HOSPADM

## 2018-02-22 RX ORDER — LORATADINE 10 MG/1
10 TABLET ORAL DAILY PRN
Status: DISCONTINUED | OUTPATIENT
Start: 2018-02-22 | End: 2018-02-23 | Stop reason: HOSPADM

## 2018-02-22 RX ORDER — SODIUM CHLORIDE, SODIUM LACTATE, POTASSIUM CHLORIDE, CALCIUM CHLORIDE 600; 310; 30; 20 MG/100ML; MG/100ML; MG/100ML; MG/100ML
20 INJECTION, SOLUTION INTRAVENOUS CONTINUOUS
Status: DISCONTINUED | OUTPATIENT
Start: 2018-02-22 | End: 2018-02-22

## 2018-02-22 RX ORDER — GLYCOPYRROLATE 0.2 MG/ML
INJECTION INTRAMUSCULAR; INTRAVENOUS AS NEEDED
Status: DISCONTINUED | OUTPATIENT
Start: 2018-02-22 | End: 2018-02-22 | Stop reason: SURG

## 2018-02-22 RX ORDER — SODIUM CHLORIDE, SODIUM LACTATE, POTASSIUM CHLORIDE, CALCIUM CHLORIDE 600; 310; 30; 20 MG/100ML; MG/100ML; MG/100ML; MG/100ML
INJECTION, SOLUTION INTRAVENOUS CONTINUOUS PRN
Status: DISCONTINUED | OUTPATIENT
Start: 2018-02-22 | End: 2018-02-22 | Stop reason: SURG

## 2018-02-22 RX ORDER — MIDAZOLAM HYDROCHLORIDE 1 MG/ML
INJECTION INTRAMUSCULAR; INTRAVENOUS AS NEEDED
Status: DISCONTINUED | OUTPATIENT
Start: 2018-02-22 | End: 2018-02-22 | Stop reason: SURG

## 2018-02-22 RX ORDER — LIDOCAINE HYDROCHLORIDE 10 MG/ML
INJECTION, SOLUTION INFILTRATION; PERINEURAL AS NEEDED
Status: DISCONTINUED | OUTPATIENT
Start: 2018-02-22 | End: 2018-02-22 | Stop reason: HOSPADM

## 2018-02-22 RX ORDER — VALACYCLOVIR HYDROCHLORIDE 500 MG/1
500 TABLET, FILM COATED ORAL DAILY
Status: DISCONTINUED | OUTPATIENT
Start: 2018-02-22 | End: 2018-02-23 | Stop reason: HOSPADM

## 2018-02-22 RX ORDER — FLUOXETINE HYDROCHLORIDE 20 MG/1
20 CAPSULE ORAL 2 TIMES DAILY
Status: DISCONTINUED | OUTPATIENT
Start: 2018-02-22 | End: 2018-02-23 | Stop reason: HOSPADM

## 2018-02-22 RX ORDER — MAGNESIUM HYDROXIDE 1200 MG/15ML
LIQUID ORAL AS NEEDED
Status: DISCONTINUED | OUTPATIENT
Start: 2018-02-22 | End: 2018-02-22 | Stop reason: HOSPADM

## 2018-02-22 RX ORDER — ACETAMINOPHEN 325 MG/1
650 TABLET ORAL EVERY 6 HOURS PRN
Status: DISCONTINUED | OUTPATIENT
Start: 2018-02-22 | End: 2018-02-23 | Stop reason: HOSPADM

## 2018-02-22 RX ORDER — FENTANYL CITRATE 50 UG/ML
INJECTION, SOLUTION INTRAMUSCULAR; INTRAVENOUS AS NEEDED
Status: DISCONTINUED | OUTPATIENT
Start: 2018-02-22 | End: 2018-02-22 | Stop reason: SURG

## 2018-02-22 RX ORDER — LORAZEPAM 0.5 MG/1
0.5 TABLET ORAL EVERY 8 HOURS PRN
Status: DISCONTINUED | OUTPATIENT
Start: 2018-02-22 | End: 2018-02-23 | Stop reason: HOSPADM

## 2018-02-22 RX ORDER — PROPOFOL 10 MG/ML
INJECTION, EMULSION INTRAVENOUS AS NEEDED
Status: DISCONTINUED | OUTPATIENT
Start: 2018-02-22 | End: 2018-02-22 | Stop reason: SURG

## 2018-02-22 RX ORDER — DEXMEDETOMIDINE HYDROCHLORIDE 100 UG/ML
INJECTION, SOLUTION INTRAVENOUS AS NEEDED
Status: DISCONTINUED | OUTPATIENT
Start: 2018-02-22 | End: 2018-02-22 | Stop reason: SURG

## 2018-02-22 RX ORDER — HYDROCODONE BITARTRATE AND ACETAMINOPHEN 5; 325 MG/1; MG/1
2 TABLET ORAL EVERY 6 HOURS PRN
Status: DISCONTINUED | OUTPATIENT
Start: 2018-02-22 | End: 2018-02-23 | Stop reason: HOSPADM

## 2018-02-22 RX ORDER — DOCUSATE SODIUM 100 MG/1
100 CAPSULE, LIQUID FILLED ORAL 2 TIMES DAILY
Status: DISCONTINUED | OUTPATIENT
Start: 2018-02-22 | End: 2018-02-23 | Stop reason: HOSPADM

## 2018-02-22 RX ORDER — SENNOSIDES 8.6 MG
1 TABLET ORAL DAILY
Status: DISCONTINUED | OUTPATIENT
Start: 2018-02-22 | End: 2018-02-23 | Stop reason: HOSPADM

## 2018-02-22 RX ORDER — SODIUM CHLORIDE 9 MG/ML
50 INJECTION, SOLUTION INTRAVENOUS CONTINUOUS
Status: DISCONTINUED | OUTPATIENT
Start: 2018-02-22 | End: 2018-02-23 | Stop reason: HOSPADM

## 2018-02-22 RX ORDER — IBUPROFEN 400 MG/1
400 TABLET ORAL EVERY 6 HOURS PRN
Status: DISCONTINUED | OUTPATIENT
Start: 2018-02-22 | End: 2018-02-23 | Stop reason: HOSPADM

## 2018-02-22 RX ORDER — ONDANSETRON 2 MG/ML
INJECTION INTRAMUSCULAR; INTRAVENOUS AS NEEDED
Status: DISCONTINUED | OUTPATIENT
Start: 2018-02-22 | End: 2018-02-22 | Stop reason: SURG

## 2018-02-22 RX ADMIN — HYDROCODONE BITARTRATE AND ACETAMINOPHEN 2 TABLET: 5; 325 TABLET ORAL at 20:35

## 2018-02-22 RX ADMIN — FENTANYL CITRATE 50 MCG: 50 INJECTION, SOLUTION INTRAMUSCULAR; INTRAVENOUS at 09:25

## 2018-02-22 RX ADMIN — FLUOXETINE 20 MG: 20 CAPSULE ORAL at 17:39

## 2018-02-22 RX ADMIN — DEXMEDETOMIDINE HYDROCHLORIDE 4 MCG: 100 INJECTION, SOLUTION INTRAVENOUS at 08:02

## 2018-02-22 RX ADMIN — CEFAZOLIN SODIUM 2000 MG: 2 SOLUTION INTRAVENOUS at 08:28

## 2018-02-22 RX ADMIN — FENTANYL CITRATE 50 MCG: 50 INJECTION, SOLUTION INTRAMUSCULAR; INTRAVENOUS at 10:12

## 2018-02-22 RX ADMIN — MIDAZOLAM HYDROCHLORIDE 2 MG: 1 INJECTION, SOLUTION INTRAMUSCULAR; INTRAVENOUS at 07:58

## 2018-02-22 RX ADMIN — DEXMEDETOMIDINE HYDROCHLORIDE 4 MCG: 100 INJECTION, SOLUTION INTRAVENOUS at 07:49

## 2018-02-22 RX ADMIN — DEXMEDETOMIDINE HYDROCHLORIDE 4 MCG: 100 INJECTION, SOLUTION INTRAVENOUS at 07:57

## 2018-02-22 RX ADMIN — SODIUM CHLORIDE, SODIUM LACTATE, POTASSIUM CHLORIDE, AND CALCIUM CHLORIDE: .6; .31; .03; .02 INJECTION, SOLUTION INTRAVENOUS at 07:29

## 2018-02-22 RX ADMIN — DEXAMETHASONE SODIUM PHOSPHATE 8 MG: 10 INJECTION INTRAMUSCULAR; INTRAVENOUS at 09:35

## 2018-02-22 RX ADMIN — FENTANYL CITRATE 50 MCG: 50 INJECTION, SOLUTION INTRAMUSCULAR; INTRAVENOUS at 08:25

## 2018-02-22 RX ADMIN — FENTANYL CITRATE 25 MCG: 50 INJECTION INTRAMUSCULAR; INTRAVENOUS at 13:20

## 2018-02-22 RX ADMIN — SENNOSIDES 8.6 MG: 8.6 TABLET, FILM COATED ORAL at 17:39

## 2018-02-22 RX ADMIN — Medication 0.2 MCG/KG/MIN: at 08:29

## 2018-02-22 RX ADMIN — HYDROCODONE BITARTRATE AND ACETAMINOPHEN 2 TABLET: 5; 325 TABLET ORAL at 15:57

## 2018-02-22 RX ADMIN — MIDAZOLAM HYDROCHLORIDE 2 MG: 1 INJECTION, SOLUTION INTRAMUSCULAR; INTRAVENOUS at 08:02

## 2018-02-22 RX ADMIN — DEXMEDETOMIDINE HYDROCHLORIDE 4 MCG: 100 INJECTION, SOLUTION INTRAVENOUS at 07:51

## 2018-02-22 RX ADMIN — PHENYLEPHRINE HYDROCHLORIDE 50 MCG/MIN: 10 INJECTION INTRAVENOUS at 08:44

## 2018-02-22 RX ADMIN — SODIUM CHLORIDE 50 ML/HR: 0.9 INJECTION, SOLUTION INTRAVENOUS at 11:27

## 2018-02-22 RX ADMIN — LORATADINE 10 MG: 10 TABLET ORAL at 17:39

## 2018-02-22 RX ADMIN — DEXMEDETOMIDINE HYDROCHLORIDE 4 MCG: 100 INJECTION, SOLUTION INTRAVENOUS at 07:54

## 2018-02-22 RX ADMIN — FENTANYL CITRATE 50 MCG: 50 INJECTION, SOLUTION INTRAMUSCULAR; INTRAVENOUS at 09:37

## 2018-02-22 RX ADMIN — DEXMEDETOMIDINE HYDROCHLORIDE 4 MCG: 100 INJECTION, SOLUTION INTRAVENOUS at 07:46

## 2018-02-22 RX ADMIN — Medication 0.2 MCG/KG/HR: at 08:29

## 2018-02-22 RX ADMIN — GLYCOPYRROLATE 0.2 MG: 0.2 INJECTION, SOLUTION INTRAMUSCULAR; INTRAVENOUS at 07:10

## 2018-02-22 RX ADMIN — FENTANYL CITRATE 25 MCG: 50 INJECTION INTRAMUSCULAR; INTRAVENOUS at 11:49

## 2018-02-22 RX ADMIN — DOCUSATE SODIUM 100 MG: 100 CAPSULE, LIQUID FILLED ORAL at 17:39

## 2018-02-22 RX ADMIN — ONDANSETRON 4 MG: 2 INJECTION INTRAMUSCULAR; INTRAVENOUS at 10:28

## 2018-02-22 RX ADMIN — MIDAZOLAM HYDROCHLORIDE 2 MG: 1 INJECTION, SOLUTION INTRAMUSCULAR; INTRAVENOUS at 07:46

## 2018-02-22 RX ADMIN — PROPOFOL 150 MG: 10 INJECTION, EMULSION INTRAVENOUS at 08:08

## 2018-02-22 NOTE — H&P (VIEW-ONLY)
Surgical Oncology Follow Up       [address] [place] [address],[facility]  [facility]  [address]  1995  [id number]  [address] [facility],[address] [place] [address]  [facility] [place]  [address] [place] [id number]    No chief complaint on file  Assessment/Plan          No history exists  History of Present Illness:   22-year-ol female who has been having increasing headaches, and occasional difficulty swallowing  She ultimately was seen in the ER on January 16th  This revealed a 4 2 x 4 5 x 5 7 cm soft tissue mass in the left supraclavicular region  This was compressing the thyroid as well as the trachea  This displaced to the common carotid artery and the internal jugular vein anteriorly  This was anterior to the left vertebral artery  MRI on January 18, 2018 revealed a 6 7 cm enhancing solid bilobed mass  The posterior margin of the tumor was associated with the left C6-C7 neural foramen  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  I personally reviewed her films  She comes in now to discuss further therapy  She occasionally has a pressure sensation in her neck when she lies down  She does have occasional dysphagia  No hoarseness  She does have some worsening shortness of breath  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  There was a lymph leak at that time and she also had a tracheostomy for 3 years  Review of Systems   Constitutional: Positive for fatigue  HENT: Negative  Eyes: Negative  Respiratory: Positive for shortness of breath  Cardiovascular: Negative  Gastrointestinal: Negative  Endocrine: Negative  Genitourinary: Negative  Musculoskeletal: Negative  Skin: Negative  Allergic/Immunologic: Negative  Neurological: Negative  Hematological: Negative  Psychiatric/Behavioral: Negative  Patient Active Problem List   Diagnosis    Depression    Chronic fatigue    Depression with anxiety    Vitamin D deficiency    Scoliosis    Epigastric pain    Asthma    Hemangioma    Sleep apnea    Weight gain     Past Medical History:   Diagnosis Date    Asthma     Sexually transmitted disease (STD)     Sleep apnea      Past Surgical History:   Procedure Laterality Date    NECK SURGERY      SURGERY OF LIP      TONSILLECTOMY AND ADENOIDECTOMY      TRACHEOSTOMY       Family History   Problem Relation Age of Onset    Hemochromatosis Father     Lung cancer Maternal Grandfather     Bone cancer Paternal Grandmother     Lung cancer Paternal Grandfather      Social History     Social History    Marital status: Single     Spouse name: N/A    Number of children: N/A    Years of education: N/A     Occupational History    Not on file       Social History Main Topics    Smoking status: Never Smoker    Smokeless tobacco: Never Used    Alcohol use Yes      Comment: occasional    Drug use: No    Sexual activity: Not on file     Other Topics Concern    Not on file     Social History Narrative    No narrative on file       Current Outpatient Prescriptions:     buPROPion (WELLBUTRIN SR) 150 mg 12 hr tablet, Take 1 tablet by mouth 2 (two) times a day, Disp: , Rfl:     ethynodiol-ethinyl estradiol (Jerrlyn Drain 1/35) 1-35 MG-MCG per tablet, Take 1 tablet by mouth daily, Disp: , Rfl:     FLUoxetine (PROzac) 20 MG tablet, TAKE 1/2-1 TABLET AT BEDTIME, Disp: , Rfl: 11    KELNOR 1/35 1-35 MG-MCG per tablet, 1 ORAL TAKE AS DIRECTED, Disp: , Rfl: 4    loratadine (CLARITIN) 10 mg tablet, Take 1 tablet by mouth daily as needed, Disp: , Rfl:     LORazepam (ATIVAN) 0 5 mg tablet, Take 1 tablet by mouth 3 (three) times a day, Disp: , Rfl:     valACYclovir (VALTREX) 500 mg tablet, TAKE 1 TABLET DAILY, Disp: , Rfl: 3    amoxicillin (AMOXIL) 500 mg capsule, TAKE 1 CAPSULE 3 TIMES A DAY, Disp: , Rfl: 0  No Known Allergies  Vitals:    01/24/18 1403   BP: 124/84   Pulse: 92   Temp: 98 2 °F (36 8 °C)       Physical Exam   Constitutional: She appears well-developed  HENT:   Head: Normocephalic  Eyes: EOM are normal  Pupils are equal, round, and reactive to light  Neck: Normal range of motion  Tracheal deviation present  There is a palpable left neck mass  This is 6 cm in size  Cardiovascular: Normal rate, regular rhythm and normal heart sounds  Pulmonary/Chest: Effort normal and breath sounds normal    Abdominal: Soft  Musculoskeletal: Normal range of motion  Lymphadenopathy:   No cervical, axillary, or inguinal adenopathy bilaterally  Neurological: She is alert  Skin: Skin is warm  Psychiatric: She has a normal mood and affect  Her behavior is normal  Judgment normal        Pathology:      Labs:      Imaging  Cta Head And Neck With And Without Contrast    Result Date: 1/16/2018  Narrative: CTA NECK AND BRAIN WITH AND WITHOUT CONTRAST INDICATION: eval for nystagmus, H/A, Hx hemangiomas  History taken directly from the electronic ordering system  COMPARISON:   None  TECHNIQUE:  Routine CT imaging of the Brain without contrast   Post contrast imaging was performed after administration of iodinated contrast through the neck and brain  Post contrast axial 0 625 mm images timed to opacify the arterial system  3D rendering was performed on an independent workstation  MIP reconstructions performed  Coronal reconstructions were performed of the noncontrast portion of the brain  Radiation dose length product (DLP) for this visit:  1392 19 mGy-cm   This examination, like all CT scans performed in the [facility], was performed utilizing techniques to minimize radiation dose exposure, including the use of iterative reconstruction and automated exposure control     IV Contrast:  85 mL of iohexol (OMNIPAQUE)     IMAGE QUALITY:   Diagnostic FINDINGS: NONCONTRAST BRAIN PARENCHYMA:  No intracranial mass, mass effect or midline shift  No acute intracranial hemorrhage  No CT signs of acute infarction  VENTRICLES AND EXTRA-AXIAL SPACES:  Normal for patient's age  VISUALIZED ORBITS AND PARANASAL SINUSES:  Unremarkable  CALVARIUM AND EXTRACRANIAL SOFT TISSUES:   Normal  CERVICAL VASCULATURE AORTIC ARCH AND GREAT VESSELS:  Normal aortic arch and great vessel origins  Normal visualized subclavian vessels  RIGHT VERTEBRAL ARTERY CERVICAL SEGMENT:  Normal origin  The vessel is normal in caliber throughout the neck  LEFT VERTEBRAL ARTERY CERVICAL SEGMENT:  Normal origin  The vessel is normal in caliber throughout the neck  RIGHT EXTRACRANIAL CAROTID SEGMENT:  Normal caliber common carotid artery  Normal bifurcation and cervical internal carotid artery  No stenosis or dissection  LEFT EXTRACRANIAL CAROTID SEGMENT:  Normal caliber common carotid artery  Normal bifurcation and cervical internal carotid artery  No stenosis or dissection  NASCET criteria was used to determine the degree of internal carotid artery diameter stenosis  INTRACRANIAL VASCULATURE INTERNAL CAROTID ARTERIES:  Normal enhancement of the intracranial portions of the internal carotid arteries  Normal ophthalmic artery origins  Normal ICA terminus  ANTERIOR CIRCULATION:  Symmetric A1 segments and anterior cerebral arteries with normal enhancement  Normal anterior communicating artery  MIDDLE CEREBRAL ARTERY CIRCULATION:  M1 segment and middle cerebral artery branches demonstrate normal enhancement bilaterally  DISTAL VERTEBRAL ARTERIES:  Normal distal vertebral arteries  Posterior inferior cerebellar artery origins are normal  Normal vertebral basilar junction  BASILAR ARTERY:  Basilar artery is normal in caliber  Normal superior cerebellar arteries  POSTERIOR CEREBRAL ARTERIES: Both posterior cerebral arteries arises from the basilar tip  Both arteries demonstrate normal flow-related enhancement     Normal posterior communicating arteries  DURAL VENOUS SINUSES:  Normal  NON VASCULAR ANATOMY BONY STRUCTURES:  Mild erosive changes along the left lateral aspect of C6 is identified could possibly related to the mass  SOFT TISSUES OF THE NECK:  There is a large 4 2 x 4 5 x 5 7 cm soft tissue mass/neoplasm identified along the left medial supraclavicular region causing mass effect on the left thyroid gland and compressing the trachea  The mass displaces the common carotid artery and internal jugular vein anteriorly  The mass is anterior to the left vertebral artery  Differential includes neurogenic tumor, metastatic disease or other etiology  Please follow-up with contrast enhanced neck MRI  Additionally, there is  prominent prevascular tissue which although could be residual thymus, I cannot exclude adenopathy  Impression: 1  No evidence of dissection, occlusion, aneurysm or significant stenosis  2  Large 4 2 x 4 5 x 5 7 cm soft tissue mass/neoplasm identified along the left medial supraclavicular region causing mass effect on the left thyroid gland and compressing the trachea  The mass displaces the common carotid artery and internal jugular vein anteriorly  The mass is anterior to the left vertebral artery  Differential includes neurogenic tumor, metastatic disease or other etiology  Mild erosive changes along the left anterolateral aspect of C6 vertebral body is identified could possibly related to the mass or degenerative change  Please follow-up with contrast enhanced neck MRI  Workstation performed: [id number]     Mri Soft Tissue Neck Wo And W Contrast    Result Date: 1/18/2018  Narrative: MRI OF THE SOFT TISSUES OF THE NECK - WITH AND WITHOUT CONTRAST INDICATION:  R22 1: Localized swelling, mass and lump, neck  History taken directly from the electronic ordering system   Left-sided neck mass COMPARISON:  1/16/2018 TECHNIQUE:  Sagittal T1, axial T2, axial inversion recovery or fat suppressed T2 and axial T1 precontrast  Axial and coronal T1 postcontrast with fat suppression  IV Contrast:  10 mL of gadobutrol injection (MULTI-DOSE) IMAGE QUALITY:  Diagnostic  FINDINGS: VISUALIZED BRAIN PARENCHYMA:  Normal  VISUALIZED ORBITS AND PARANASAL SINUSES:  Normal  NASAL CAVITY AND NASOPHARYNX:  Normal  SUPRAHYOID NECK:  Normal oral cavity, tongue base, tonsillar fossa and epiglottis  Marked expansion of the parapharyngeal fat pads bilaterally with slight mass effect on the pterygoid muscles which are mildly displaced anteriorly  There is also asymmetric lipomatous hypertrophy of the submandibular fat pads left greater than right  INFRAHYOID NECK:  Aryepiglottic folds, laryngeal tissues, and piriform sinuses are normal   There is a T1 hypointense bilobed ovoid mass in the left supraclavicular fossa, displacing the trachea and left lobe of the thyroid gland towards the right  There is associated tracheal narrowing, the narrowest transverse cross-sectional diameter of the trachea is approximately 0 5 cm on image 42, series 3 with an AP diameter of approximately 1 6 cm at this level  The left paratracheal/supraclavicular mass is slightly heterogeneous with a few internal T1 hyperintense elements  It demonstrates exuberant enhancement and has a bilobed morphology, the more posterior margin extending to the left neural foramen at C6-C7  The posterior bilobed configuration appears to be associated with the exiting nerve root at this level, perhaps best seen on image 39, series 9 but well seen on other images as well including image 39 of series 6  The tumor enhances exuberantly and has T2 hyperintensity, characteristics of a nerve sheath tumor  There are smooth margins of the mass  In aggregate the tumor measures approximately 6 7 cm maximal craniocaudal dimension x 5 1 cm maximal AP dimension x 4 2 cm maximal transverse dimension   THYROID GLAND:   The left lobe of the thyroid gland is effaced and compressed, displaced anteriorly into the right  PAROTID AND SUBMANDIBULAR GLANDS:  Normal  LYMPH NODES:  No pathologic or enlarged adenopathy  VASCULAR STRUCTURES:  Grossly normal flow voids of the cervical vasculature  THORACIC INLET: Lung apices and upper mediastinum are grossly unremarkable  CERVICAL SPINE:  Normal alignment of the cervical spine  Normal marrow signal   No gross evidence of degenerative disease  Cervical cord is grossly normal in signal      Impression: Large, 6 7 cm exuberantly enhancing solid bilobed mass in the left paratracheal/supraclavicular region with the posterior margin of the tumor associated with the left C6-C7 neural foramen, 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  I have discussed this previously with Dr Radames Hills  Given that this is symptomatic, and is probably a schwannoma, I recommended excision  I did discuss that if these do have a risk of malignant degeneration and given her young age I would recommend surgical excision  The risks were explained including bleeding, infection, recurrence, need for further surgery, wound complications, adjacent organ injury, neurovascular injury, MI, stroke, DVT, pulmonary embolism, and death  Informed consent was obtained  We will coordinate this with Dr Radames Hills and we will schedule this at our earliest mutual convenience  She and her mother are agreeable to this  All their questions were answered

## 2018-02-22 NOTE — PLAN OF CARE
Problem: PAIN - ADULT  Goal: Verbalizes/displays adequate comfort level or baseline comfort level  Interventions:  - Encourage patient to monitor pain and request assistance  - Assess pain using appropriate pain scale  - Administer analgesics based on type and severity of pain and evaluate response  - Implement non-pharmacological measures as appropriate and evaluate response  - Consider cultural and social influences on pain and pain management  - Notify physician/advanced practitioner if interventions unsuccessful or patient reports new pain  Outcome: Progressing      Problem: INFECTION - ADULT  Goal: Absence or prevention of progression during hospitalization  INTERVENTIONS:  - Assess and monitor for signs and symptoms of infection  - Monitor lab/diagnostic results  - Monitor all insertion sites, i e  indwelling lines, tubes, and drains  - Monitor endotracheal (as able) and nasal secretions for changes in amount and color  - [place] appropriate cooling/warming therapies per order  - Administer medications as ordered  - Instruct and encourage patient and family to use good hand hygiene technique  - Identify and instruct in appropriate isolation precautions for identified infection/condition  Outcome: Progressing    Goal: Absence of fever/infection during neutropenic period  INTERVENTIONS:  - Monitor WBC  - Implement neutropenic guidelines  Outcome: Progressing      Problem: SAFETY ADULT  Goal: Patient will remain free of falls  INTERVENTIONS:  - Assess patient frequently for physical needs  -  Identify cognitive and physical deficits and behaviors that affect risk of falls    -  [place] fall precautions as indicated by assessment   - Educate patient/family on patient safety including physical limitations  - Instruct patient to call for assistance with activity based on assessment  - Modify environment to reduce risk of injury  - Consider OT/PT consult to assist with strengthening/mobility  Outcome: Progressing    Goal: Maintain or return to baseline ADL function  INTERVENTIONS:  -  Assess patient's ability to carry out ADLs; assess patient's baseline for ADL function and identify physical deficits which impact ability to perform ADLs (bathing, care of mouth/teeth, toileting, grooming, dressing, etc )  - Assess/evaluate cause of self-care deficits   - Assess range of motion  - Assess patient's mobility; develop plan if impaired  - Assess patient's need for assistive devices and provide as appropriate  - Encourage maximum independence but intervene and supervise when necessary  ¯ Involve family in performance of ADLs  ¯ Assess for home care needs following discharge   ¯ Request OT consult to assist with ADL evaluation and planning for discharge  ¯ Provide patient education as appropriate  Outcome: Progressing    Goal: Maintain or return mobility status to optimal level  INTERVENTIONS:  - Assess patient's baseline mobility status (ambulation, transfers, stairs, etc )    - Identify cognitive and physical deficits and behaviors that affect mobility  - Identify mobility aids required to assist with transfers and/or ambulation (gait belt, sit-to-stand, lift, walker, cane, etc )  - [place] fall precautions as indicated by assessment  - Record patient progress and toleration of activity level on Mobility SBAR; progress patient to next Phase/Stage  - Instruct patient to call for assistance with activity based on assessment  - Request Rehabilitation consult to assist with strengthening/weightbearing, etc   Outcome: Progressing

## 2018-02-22 NOTE — ANESTHESIA POSTPROCEDURE EVALUATION
Post-Op Assessment Note      CV Status:  Stable    Mental Status:  Alert and awake    Hydration Status:  Euvolemic    PONV Controlled:  Controlled    Airway Patency:  Patent  Airway: intubated    Post Op Vitals Reviewed: Yes          Staff: Anesthesiologist           /67 (02/22/18 1057)    Temp 97 6 °F (36 4 °C) (02/22/18 1057)    Pulse 103 (02/22/18 1057)   Resp 14 (02/22/18 1057)    SpO2 98 % (02/22/18 1057)

## 2018-02-22 NOTE — H&P (VIEW-ONLY)
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, had a trach postop, that was removed few years later  More recently presented to the hospital with headaches, dizziness  That prompted workup which demonstrated a left neck mass  On MRI of the neck as well as CTA of the neck this appears to be a mass contiguous with the exiting C6, C7 nerve roots i e  a schwannoma  It displaces the carotid anterior medially, the jugular anterior laterally, the vertebral artery posteriorly, and is in continuity with the exiting nerve roots  Plan is for surgical excision (anticipated subtotal resection) as co-surgeon with Dr Linda Osullivan  We will likely be able to offer focused radiation/radiosurgery as adjuvant therapy to residual to prevent recurrence, will be handled through [facility]    Risks personally reviewed with patient and her mother:  Stroke from vessel injury which could result in speech difficulty, right-sided hai paresis/plegia, or brainstem injury, dysphagia, vagal nerve injury, and exiting cervical nerve root injury causing weakness, numbness, or dysesthetic pain  Alternatives reviewed  Agreed to proceed  Consent obtained  EQ5D5L:  111 3 4, or 0 661, VA S 60-70, KPS 90, ECOG 0        Subjective:      Patient ID: Loni Ordoñez is a 25 y o  female  Recent hospitalization for headache, dizziness  Imaging demonstrated left neck mass-incidental     Denies left arm weakness, numbness, tingling  Some dysphagia           Current Outpatient Prescriptions on File Prior to Visit   Medication Sig Dispense Refill    buPROPion [facility] SR) 150 mg 12 hr tablet Take 1 tablet by mouth 2 (two) times a day      ethynodiol-ethinyl estradiol (Terrilyn Comber 1/35) 1-35 MG-MCG per tablet Take 1 tablet by mouth daily      FLUoxetine (PROzac) 20 MG tablet TAKE 1 TABLET 2x daily  11    loratadine (CLARITIN) 10 mg tablet Take 1 tablet by mouth daily as needed      LORazepam (ATIVAN) 0 5 mg tablet Take 1 tablet by mouth every 8 (eight) hours as needed        valACYclovir (VALTREX) 500 mg tablet TAKE 1 TABLET DAILY PRN  3    amoxicillin (AMOXIL) 500 mg capsule TAKE 1 CAPSULE 3 TIMES A DAY  0    [DISCONTINUED] [facility] 1/35 1-35 MG-MCG per tablet 1 ORAL TAKE AS DIRECTED  4     No current facility-administered medications on file prior to visit  No Known Allergies      Past Medical History:   Diagnosis Date    Asthma     Sexually transmitted disease (STD)     Sleep apnea      Past Surgical History:   Procedure Laterality Date    NECK SURGERY      SURGERY OF LIP      TONSILLECTOMY AND ADENOIDECTOMY      TRACHEOSTOMY       Social History   Substance Use Topics    Smoking status: Former Smoker    Smokeless tobacco: Never Used      Comment: socially, former    Alcohol use Yes      Comment: occasional     Family History   Problem Relation Age of Onset    Hemochromatosis Father     Lung cancer Maternal Grandfather     Bone cancer Paternal Grandmother     Lung cancer Paternal Grandfather          The following portions of the patient's history were reviewed and updated as appropriate: allergies, current medications, past family history, past medical history, past social history and past surgical history  Review of Systems      Constitutional: Positive for unexpected weight change (gained 30 lbs past year)  HENT: Positive for tinnitus (occasional, bilateral, pulsatile) and trouble swallowing (does not have trouble swallowing but can feel tumor when swallowing)  Eyes: Positive for photophobia (with headaches), pain (with headaches) and visual disturbance (blurred with headaches)  Respiratory: Positive for shortness of breath (on exertion)      Gastrointestinal: Positive for abdominal pain (occasional), constipation and nausea (frequent severe nausea, lexie to endocrinologist and will f/u with gastro)  Endocrine:        Always hot, gets hot flashes frequently   Genitourinary:        Occasional stress incontinence   Musculoskeletal: Negative  Skin: Negative  Allergic/Immunologic:        Seasonal   Neurological: Positive for dizziness, weakness, light-headedness and headaches (5/10 frontal, has had one for a couple days, constant, aleve helped but did not relieve, aching pain with intermittent throbbing)  Negative for seizures, syncope, speech difficulty and numbness  Hematological: Negative  Psychiatric/Behavioral: Positive for decreased concentration and sleep disturbance (severe fatigue, difficulty sleeping, napping a lot)  Negative for confusion  The patient is nervous/anxious  Objective:     Physical Exam   Constitutional: She is oriented to person, place, and time  She appears well-developed and well-nourished  HENT:   Head: Normocephalic  Neck:       Cardiovascular: Normal rate and regular rhythm  Pulmonary/Chest: Breath sounds normal    Abdominal: Soft  Neurological: She is alert and oriented to person, place, and time  No cranial nerve deficit  GCS eye subscore is 4  GCS verbal subscore is 5  GCS motor subscore is 6  Skin: Skin is warm  Psychiatric: Her behavior is normal          Neurologic Exam     Mental Status   Oriented to person, place, and time  Motor Exam     Strength   Right deltoid: 5/5  Left deltoid: 5/5  Left biceps: 5/5  Left triceps: 5/5  Left wrist extension: 5/5  Left interossei: 5/5    Sensory Exam   Light touch normal    Pinprick normal        MEDICAL DECISION MAKING    Imaging Studies:     Cta Head And Neck With And Without Contrast    Result Date: 1/16/2018  Narrative: CTA NECK AND BRAIN WITH AND WITHOUT CONTRAST INDICATION: eval for nystagmus, H/A, Hx hemangiomas  History taken directly from the electronic ordering system  COMPARISON:   None   TECHNIQUE:  Routine CT imaging of the Brain without contrast   Post contrast imaging was performed after administration of iodinated contrast through the neck and brain  Post contrast axial 0 625 mm images timed to opacify the arterial system  3D rendering was performed on an independent workstation  MIP reconstructions performed  Coronal reconstructions were performed of the noncontrast portion of the brain  Radiation dose length product (DLP) for this visit:  1392 19 mGy-cm   This examination, like all CT scans performed in the [facility], was performed utilizing techniques to minimize radiation dose exposure, including the use of iterative reconstruction and automated exposure control  IV Contrast:  85 mL of iohexol (OMNIPAQUE)     IMAGE QUALITY:   Diagnostic FINDINGS: NONCONTRAST BRAIN PARENCHYMA:  No intracranial mass, mass effect or midline shift  No acute intracranial hemorrhage  No CT signs of acute infarction  VENTRICLES AND EXTRA-AXIAL SPACES:  Normal for patient's age  VISUALIZED ORBITS AND PARANASAL SINUSES:  Unremarkable  CALVARIUM AND EXTRACRANIAL SOFT TISSUES:   Normal  CERVICAL VASCULATURE AORTIC ARCH AND GREAT VESSELS:  Normal aortic arch and great vessel origins  Normal visualized subclavian vessels  RIGHT VERTEBRAL ARTERY CERVICAL SEGMENT:  Normal origin  The vessel is normal in caliber throughout the neck  LEFT VERTEBRAL ARTERY CERVICAL SEGMENT:  Normal origin  The vessel is normal in caliber throughout the neck  RIGHT EXTRACRANIAL CAROTID SEGMENT:  Normal caliber common carotid artery  Normal bifurcation and cervical internal carotid artery  No stenosis or dissection  LEFT EXTRACRANIAL CAROTID SEGMENT:  Normal caliber common carotid artery  Normal bifurcation and cervical internal carotid artery  No stenosis or dissection  NASCET criteria was used to determine the degree of internal carotid artery diameter stenosis   INTRACRANIAL VASCULATURE INTERNAL CAROTID ARTERIES:  Normal enhancement of the intracranial portions of the internal carotid arteries  Normal ophthalmic artery origins  Normal ICA terminus  ANTERIOR CIRCULATION:  Symmetric A1 segments and anterior cerebral arteries with normal enhancement  Normal anterior communicating artery  MIDDLE CEREBRAL ARTERY CIRCULATION:  M1 segment and middle cerebral artery branches demonstrate normal enhancement bilaterally  DISTAL VERTEBRAL ARTERIES:  Normal distal vertebral arteries  Posterior inferior cerebellar artery origins are normal  Normal vertebral basilar junction  BASILAR ARTERY:  Basilar artery is normal in caliber  Normal superior cerebellar arteries  POSTERIOR CEREBRAL ARTERIES: Both posterior cerebral arteries arises from the basilar tip  Both arteries demonstrate normal flow-related enhancement  Normal posterior communicating arteries  DURAL VENOUS SINUSES:  Normal  NON VASCULAR ANATOMY BONY STRUCTURES:  Mild erosive changes along the left lateral aspect of C6 is identified could possibly related to the mass  SOFT TISSUES OF THE NECK:  There is a large 4 2 x 4 5 x 5 7 cm soft tissue mass/neoplasm identified along the left medial supraclavicular region causing mass effect on the left thyroid gland and compressing the trachea  The mass displaces the common carotid artery and internal jugular vein anteriorly  The mass is anterior to the left vertebral artery  Differential includes neurogenic tumor, metastatic disease or other etiology  Please follow-up with contrast enhanced neck MRI  Additionally, there is  prominent prevascular tissue which although could be residual thymus, I cannot exclude adenopathy  Impression: 1  No evidence of dissection, occlusion, aneurysm or significant stenosis  2  Large 4 2 x 4 5 x 5 7 cm soft tissue mass/neoplasm identified along the left medial supraclavicular region causing mass effect on the left thyroid gland and compressing the trachea  The mass displaces the common carotid artery and internal jugular vein anteriorly   The mass is anterior to the left vertebral artery  Differential includes neurogenic tumor, metastatic disease or other etiology  Mild erosive changes along the left anterolateral aspect of C6 vertebral body is identified could possibly related to the mass or degenerative change  Please follow-up with contrast enhanced neck MRI  Workstation performed: [id number]     Mri Soft Tissue Neck Wo And W Contrast    Result Date: 1/18/2018  Narrative: MRI OF THE SOFT TISSUES OF THE NECK - WITH AND WITHOUT CONTRAST INDICATION:  R22 1: Localized swelling, mass and lump, neck  History taken directly from the electronic ordering system  Left-sided neck mass COMPARISON:  1/16/2018 TECHNIQUE:  Sagittal T1, axial T2, axial inversion recovery or fat suppressed T2 and axial T1 precontrast   Axial and coronal T1 postcontrast with fat suppression  IV Contrast:  10 mL of gadobutrol injection (MULTI-DOSE) IMAGE QUALITY:  Diagnostic  FINDINGS: VISUALIZED BRAIN PARENCHYMA:  Normal  VISUALIZED ORBITS AND PARANASAL SINUSES:  Normal  NASAL CAVITY AND NASOPHARYNX:  Normal  SUPRAHYOID NECK:  Normal oral cavity, tongue base, tonsillar fossa and epiglottis  Marked expansion of the parapharyngeal fat pads bilaterally with slight mass effect on the pterygoid muscles which are mildly displaced anteriorly  There is also asymmetric lipomatous hypertrophy of the submandibular fat pads left greater than right  INFRAHYOID NECK:  Aryepiglottic folds, laryngeal tissues, and piriform sinuses are normal   There is a T1 hypointense bilobed ovoid mass in the left supraclavicular fossa, displacing the trachea and left lobe of the thyroid gland towards the right  There is associated tracheal narrowing, the narrowest transverse cross-sectional diameter of the trachea is approximately 0 5 cm on image 42, series 3 with an AP diameter of approximately 1 6 cm at this level    The left paratracheal/supraclavicular mass is slightly heterogeneous with a few internal T1 hyperintense elements  It demonstrates exuberant enhancement and has a bilobed morphology, the more posterior margin extending to the left neural foramen at C6-C7  The posterior bilobed configuration appears to be associated with the exiting nerve root at this level, perhaps best seen on image 39, series 9 but well seen on other images as well including image 39 of series 6  The tumor enhances exuberantly and has T2 hyperintensity, characteristics of a nerve sheath tumor  There are smooth margins of the mass  In aggregate the tumor measures approximately 6 7 cm maximal craniocaudal dimension x 5 1 cm maximal AP dimension x 4 2 cm maximal transverse dimension  THYROID GLAND:   The left lobe of the thyroid gland is effaced and compressed, displaced anteriorly into the right  PAROTID AND SUBMANDIBULAR GLANDS:  Normal  LYMPH NODES:  No pathologic or enlarged adenopathy  VASCULAR STRUCTURES:  Grossly normal flow voids of the cervical vasculature  THORACIC INLET: Lung apices and upper mediastinum are grossly unremarkable  CERVICAL SPINE:  Normal alignment of the cervical spine  Normal marrow signal   No gross evidence of degenerative disease  Cervical cord is grossly normal in signal      Impression: Large, 6 7 cm exuberantly enhancing solid bilobed mass in the left paratracheal/supraclavicular region with the posterior margin of the tumor associated with the left C6-C7 neural foramen, 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 Workstation performed: [id number]       I have personally reviewed pertinent reports     and I have personally reviewed pertinent films in PACS    Dr Radha Saez office note from 1/24/18 personally reviewed, summarized in discussion

## 2018-02-22 NOTE — ANESTHESIA PROCEDURE NOTES
Arterial Line Insertion  Date/Time: 2/22/2018 8:19 AM  Performed by: Dalia Ambriz by: Jodee Batista   Consent: Verbal consent obtained  Written consent obtained  Risks and benefits: risks, benefits and alternatives were discussed  Consent given by: patient  Patient understanding: patient states understanding of the procedure being performed  Time out: Immediately prior to procedure a "time out" was called to verify the correct patient, procedure, equipment, support staff and site/side marked as required  Preparation: Patient was prepped and draped in the usual sterile fashion  Indications: hemodynamic monitoring  Orientation:  RightLocation: radial artery  Anesthesia method: ga    Bakari's test normal: yes  Needle gauge: 20  Seldinger technique: Seldinger technique used  Number of attempts: 1  Post-procedure: dressing applied  Post-procedure CNS: normal  Patient tolerance: Patient tolerated the procedure well with no immediate complications

## 2018-02-22 NOTE — OP NOTE
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)   Excision left neck level 3 lymph nodes    Specimen(s):  ID Type Source Tests Collected by Time Destination   1 : Left Level 3 Tissue Lymph Node TISSUE EXAM Sergio Bruno MD 2018 [id number]    2 : left neck mass  short superior, long lateral Tissue Mass TISSUE EXAM Sergio Bruno MD 2018 0945        Estimated Blood Loss:   100 mL    Drains:  23 [place] drain       Anesthesia Type:   General    Operative Indications:  Neck mass [R251]  71-year-old female with a left neck mass  This was suspected to be a neurogenic type tumor  It was recommended that this area be excised as this was symptomatic  Risks and benefits were explained  Informed consent was obtained  Patient was brought to the operating room  Operative Findings:  A large in capsulated mass in the anterior neck, emanating from the C6-C7 disc level  Complications:   None    Procedure and Technique:  After identifying the patient, the patient was fiberoptically intubated in an awake fashion  Lines were placed by Anesthesia  Patient was then prepped and draped in the usual sterile fashion  Mass was palpable from the level of the clavicle to the mid neck  A time-out was performed  An incision was made on the anterior border of the sternocleidomastoid  Using sharp dissection this was taken down through the skin, subcutaneous tissue and through the platysma  We then dissected along the anterior border of the sternocleidomastoid dissecting this away from the mass  We now identified the jugular vein    The omohyoid was draped over this mass and this was dissected away from this mass and ultimately divided using sharp dissection inferiorly  There were several enlarged level 2 lymph nodes  These were dissected away from jugular using the Harmonic scalpel and sent off as level 3 lymph nodes  The jugular vein was now dissected laterally away from this mass  The carotid was identified and this was dissected laterally away from this mass after this was looped and gently retracted  The vagus nerve was now identified and this was dissected away from this mass and gently retracted laterally as well  We now were able to free the lower extent of the mass at the level of the clavicle  Medially, the mass was sharply dissected free from the trachea, esophagus and larynx  Superiorly the mass appeared to be displacing the longus colli, and this was divided freeing this up superiorly  We were now using a combination of sharp and blunt dissection able to elevate this mass anteriorly and took this to the posterior pedicle of the mass near the nerve root  This was now sharply divided  This was now sent to pathology  This point, Dr Tanvir Wayne proceeded with his portion of the dissection  The anterior spine was identified, and the pedicle of the mass dove lateral to the disc at C6-7  The lobule of mass eminating anteriorly was stimulated, and stimulated C7 nerve root at 5mA, suggesting collateral spread  This eminating mass was debulked posteriorly, but not all the way back to the foramen  The vertebral artery was lateral on imaging, but not visualized  Hemostasis was achieved with thrombin soaked gelfoam   At this point, I felt that that 99% of the tumor had been removed  Any further posterior dissection towards the foramina or vertebral artery could be potentially hazardous  At this time the wound was now copiously irrigated  There was excellent hemostasis  A Valsalva maneuver was performed and there was no evidence of any Chyle leak   A [address] drain was placed through a separate stab incision and secured to the skin using 3-0 nylon suture  Platysma was approximated with a running 3-0 Vicryl suture  Skin was approximated with a running 4-0 Monocryl suture in a subcuticular fashion  Histocryl was used on the skin  Patient was then extubated having tolerated the procedure well and taken to the recovery room in stable condition  I was present and participated in all aspects of this procedure  Because of the structures involved in the anterior neck to including the trachea, esophagus, carotid, and multiple lymph vessels a co-surgeon was required  I performed the exposure and elevation of the mass  Dr Castro Ceja performed the posterior dissection along the spine and the nerve roots         I was present for the entire procedure and A co-surgeon was required because of skills and techniques relevant to speciality    Patient Disposition:  PACU     SIGNATURE: Sergio Bruno MD  DATE: February 22, 2018  TIME: 10:59 AM

## 2018-02-22 NOTE — OP NOTE
Neurosurgery Operative Room Note    Demetrice George  2/22/2018    Pre-op Diagnosis:   Neck mass [R22 1]    Post-op Dignosis:     Post-Op Diagnosis Codes:     * Neck mass [R22 1]    Procedure:  Procedure(s):  excision of left neck mass      Surgeon: Surgeon(s) and Role:  Panel 1:     * Levy Deluca MD - Primary     * Yasmani Ko MD - Assisting    Panel 2:     * Jose Alfredo Mckeon MD - Primary     Anesthesia: General    Staff:   Circulator: Bernardino Blank RN; Nita Aguillon RN  Relief Circulator: Perla Luna RN  Scrub Person: Stephan Rivas RN  No qualified Resident was available  Estimated Blood Loss: Minimal    Specimens:                  Order Name Source Comment Collection Info Order Time   TYPE AND SCREEN Arm, Left  Collected By: Jessica Cloud 2/22/2018  6:13 AM    Has the patient been transfused in the last 3 months? Unknown       Explanatory Comment:  unknown       Has the patient been pregnant within the last 3 months? Unknown       Medical or Pre-op  PreOp       Where is the Surgery Scheduled? [address] [facility]    2/22/2018  7:24 AM    Has consent been obtained? Yes       Date of Surgery  2/22/2018 0730      Where is the Surgery Scheduled? 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, had a trach postop, that was removed few years later      More recently presented to the hospital with headaches, dizziness  That prompted workup which demonstrated a left neck mass that was greater than 5 cm on imaging  On MRI of the neck as well as CTA of the neck this appears to be a mass contiguous with the exiting C7 nerve roots i e  Likely a schwannoma    It displaces the carotid anterior medially, the jugular anterior laterally, the vertebral artery posteriorly, and is in continuity with the exiting nerve root      Plan was for surgical excision (anticipated subtotal resection) as co-surgeon with Dr Yadira Lorenzo  We can then offer focused radiation/radiosurgery as adjuvant therapy to residual to prevent recurrence, will be handled through [facility]     Risks personally reviewed with patient and her mother:  Stroke from vessel injury which could result in speech difficulty, right-sided hai paresis/plegia, or brainstem injury, dysphagia, vagal nerve injury, and exiting cervical nerve root injury causing weakness, numbness, or dysesthetic pain  Alternatives reviewed  Agreed to proceed  Consent obtained        [id number]:  111 3 4, or 0 661, VA S 60-70, KPS 90, ECOG 0    Details of Procedure    Patient was brought to the OR and was fiber optically intubated awake  An arterial line and sufficient IV access was then placed  The patient was supine on the or table, head extended and rotated right, exposing the left neck  The anterior, left cervical mass was palpable, from just at the clavicle and superiorly  The area was prepped with Hibiclens and draped in the standard fashion  A carotid style incision was marked  A full surgical time out was performed  A skin scalpel was used to make skin incision, and Bovie used to dissect down to and through the platysma  Dissection was performed with a Gently and Harmonic Scalpel, dividing soft tissues and identifying the carotid, jugular, and vagus nerve  The was was readily apparent behind these structures, and as imaging indicated, was > 5 cm  The carotid and vagus marked with a vessel loop and gently mobilized laterally  A large lymph node was identified and dissected out, and sent as a 'level 3 lymph node '     Anteriorly the mass came up behind the omohyoid, which was divided   The Tumor capsule was identified, and we worked to dissect around the mass inferiorly, superiorly, as well as medially identifying midline structures such as strap muscles, trachea, and esophagus  Superiorly the mass anteriorly displaced the longus coli, which was divided near its remaining attachment to the spine  The same was done inferiorly  Laterally soft tissues were dissected off  Eventually we reached the posterior pedicle of the mass, and divided this, removing the bulk of the mass  This was sent for permanent pathology  The anterior spine was identified, and the pedicle of the mass dove lateral to the disc at C6-7  The anterior lateral mass of C6, over the transverse foramen, was dissected free  The lobule of mass eminating anteriorly was stimulated, and stimulated C7 nerve root at 5 mA, suggesting collateral spread  This eminating mass was debulked, but not all the way back to the foramen  The vertebral artery was lateral on imaging, but not visualized  There was no changes in neuro monitoring  Hemostasis was achieved with thrombin soaked gelfoam, etc  A Bernard drain was left in the resection bed  The platysma was reapproximated with a running Vicryl  The skin was closed with a running 4-0 Monocryl subcuticular, and dressed with Histoacryl  The drain was secured with a drain stitch  All instrument counts, sponge counts, and needle counts were correct prior to closure the skin  The patient was awoken from general endotracheal anesthesia, extubated, and taken to the PACU in cardiovascularly stable condition  I was present for the entire procedure  Because of the structures involved (anterior neck, carotid, potential lymph vessels, etc ), co-surgery was appropriate and necessary for resection of this mass  Dr Toshia Pierce was present for the entire procedure, and provided essential assistance with with proper exposure, retraction, hemostasis, and wound closure, which was also necessary secondary to the complex nature of this case             Rocío Concepcion MD Date: 2/22/2018  Time: 10:28 AM

## 2018-02-23 VITALS
WEIGHT: 240 LBS | HEART RATE: 96 BPM | HEIGHT: 68 IN | OXYGEN SATURATION: 96 % | DIASTOLIC BLOOD PRESSURE: 77 MMHG | BODY MASS INDEX: 36.37 KG/M2 | TEMPERATURE: 99 F | SYSTOLIC BLOOD PRESSURE: 136 MMHG | RESPIRATION RATE: 20 BRPM

## 2018-02-23 LAB
ANION GAP SERPL CALCULATED.3IONS-SCNC: 8 MMOL/L (ref 4–13)
BUN SERPL-MCNC: 7 MG/DL (ref 5–25)
CALCIUM SERPL-MCNC: 8.5 MG/DL (ref 8.3–10.1)
CHLORIDE SERPL-SCNC: 108 MMOL/L (ref 100–108)
CO2 SERPL-SCNC: 23 MMOL/L (ref 21–32)
CREAT SERPL-MCNC: 0.41 MG/DL (ref 0.6–1.3)
ERYTHROCYTE [DISTWIDTH] IN BLOOD BY AUTOMATED COUNT: 13 % (ref 11.6–15.1)
GFR SERPL CREATININE-BSD FRML MDRD: 147 ML/MIN/1.73SQ M
GLUCOSE SERPL-MCNC: 117 MG/DL (ref 65–140)
HCT VFR BLD AUTO: 35.7 % (ref 34.8–46.1)
HGB BLD-MCNC: 12.2 G/DL (ref 11.5–15.4)
MCH RBC QN AUTO: 31.3 PG (ref 26.8–34.3)
MCHC RBC AUTO-ENTMCNC: 34.2 G/DL (ref 31.4–37.4)
MCV RBC AUTO: 92 FL (ref 82–98)
PLATELET # BLD AUTO: 368 THOUSANDS/UL (ref 149–390)
PMV BLD AUTO: 9.4 FL (ref 8.9–12.7)
POTASSIUM SERPL-SCNC: 3.2 MMOL/L (ref 3.5–5.3)
RBC # BLD AUTO: 3.9 MILLION/UL (ref 3.81–5.12)
SODIUM SERPL-SCNC: 139 MMOL/L (ref 136–145)
WBC # BLD AUTO: 16.16 THOUSAND/UL (ref 4.31–10.16)

## 2018-02-23 PROCEDURE — 80048 BASIC METABOLIC PNL TOTAL CA: CPT | Performed by: SURGERY

## 2018-02-23 PROCEDURE — 85027 COMPLETE CBC AUTOMATED: CPT | Performed by: SURGERY

## 2018-02-23 PROCEDURE — 99024 POSTOP FOLLOW-UP VISIT: CPT | Performed by: NEUROLOGICAL SURGERY

## 2018-02-23 RX ORDER — HYDROCODONE BITARTRATE AND ACETAMINOPHEN 5; 325 MG/1; MG/1
2 TABLET ORAL EVERY 6 HOURS PRN
Qty: 30 TABLET | Refills: 0 | Status: SHIPPED | OUTPATIENT
Start: 2018-02-23 | End: 2018-02-28 | Stop reason: SDUPTHER

## 2018-02-23 RX ADMIN — HYDROCODONE BITARTRATE AND ACETAMINOPHEN 1 TABLET: 5; 325 TABLET ORAL at 09:18

## 2018-02-23 RX ADMIN — DOCUSATE SODIUM 100 MG: 100 CAPSULE, LIQUID FILLED ORAL at 09:18

## 2018-02-23 RX ADMIN — HYDROCODONE BITARTRATE AND ACETAMINOPHEN 2 TABLET: 5; 325 TABLET ORAL at 00:53

## 2018-02-23 RX ADMIN — FLUOXETINE 20 MG: 20 CAPSULE ORAL at 09:18

## 2018-02-23 RX ADMIN — ENOXAPARIN SODIUM 40 MG: 40 INJECTION SUBCUTANEOUS at 09:19

## 2018-02-23 RX ADMIN — SENNOSIDES 8.6 MG: 8.6 TABLET, FILM COATED ORAL at 09:18

## 2018-02-23 RX ADMIN — HYDROCODONE BITARTRATE AND ACETAMINOPHEN 2 TABLET: 5; 325 TABLET ORAL at 04:40

## 2018-02-23 RX ADMIN — HYDROCODONE BITARTRATE AND ACETAMINOPHEN 1 TABLET: 5; 325 TABLET ORAL at 11:32

## 2018-02-23 NOTE — PROGRESS NOTES
Progress Note - Neurosurgery   Emeterio Hennessy 26 y o  female MRN: [id number]  Unit/Bed#: [facility] 623-01 Encounter: [id number]    Assessment/Plan:    · POD #1 from Procedure(s): excision of left neck neurogenic tumor  · UE motor, sensation intact  · Left eye ptosis, and miosis - c/w Ulysses's syndrome s/p neck exploration  Will follow  · Anticipate d/c today, +/- drain d/c vs  Home with drain per Dr Fanny Sotelo  · Should follow-up with me in office in 2 weeks to review pathology and establish additional follow-up at [facility] to discuss potential adjuvant SRS/SRT to residual      Subjective:     "I feel great "    Objective:     Vitals: Blood pressure 136/77, pulse 96, temperature 99 °F (37 2 °C), temperature source Oral, resp  rate 20, height 5' 8" (1 727 m), weight 109 kg (240 lb), last menstrual period 01/30/2018, SpO2 96 %  ,Body mass index is 36 49 kg/m²  Awake, alert, no acute distress  FC, fluent, appropriate  OS ptosis   Asymmetric pupils - R 4mm RRL, L 2-3 mm, RRL  FS  TML  UE: D/B/T/WE//IO 5/5 bilaterally  Sensation - Intact to LT throughout  CDI - CRISTIANO with min serosang

## 2018-02-23 NOTE — NURSING NOTE
Pt being discharged to home with VNA  Scripts being delivered to room by [address]  Parents are in the room

## 2018-02-23 NOTE — SOCIAL WORK
DC Planning:     Pt written for discharge and will needs RN VNA services for neck CRISTIANO mgmt  MD gave CM script for [facility]co and stated pt is requesting [facility]  CM tubed script to [facility]  CM met with pt and parents at bedside to review dc plan, dc needs and offer a VNA referral  Pt agreeable to dc plan with RN VNA services and stated preference for [facility]; [facility] sent  VNA accepting the patient for services, VNA information entered in to [facility] follow up providers  KAYLAH Meyers advised that the patient is ready for discharge and [facility] will deliver dc medication to pt's room     Pt has follow up appt with Dr Karen Morrissey March 9th at 0930 am

## 2018-02-23 NOTE — CASE MANAGEMENT
Thank you,  [address] [facility] in the [facility] by Jamal Flowers for 2017  Network Utilization Review Department  Phone: [phone]; Fax [phone]  ATTENTION: The Network Utilization Review Department is now centralized for our 7 Facilities  Make a note that we have a new phone and fax numbers for our Department  Please call with any questions or concerns to [phone] and carefully follow the prompts so that you are directed to the right person  All voicemails are confidential  Fax any determinations, approvals, denials, and requests for initial or continue stay review clinical to [phone]  Due to HIGH CALL volume, it would be easier if you could please send faxed requests to expedite your requests and in part, help us provide discharge notifications faster  Initial Clinical Review    Age/Sex: 26 y o  female    Surgery Date: 2/22/18    Procedure: excision of left neck neurogenic tumor (Left)   Excision left neck level 3 lymph nodes    Anesthesia: General    Operative Findings:  A large in capsulated mass in the anterior neck, emanating from the C6-C7 disc level  Admission Orders: Date/Time/Statement: 2/22/18 @ 1408     Orders Placed This Encounter   Procedures    Inpatient Admission     Standing Status:   Standing     Number of Occurrences:   1     Order Specific Question:   Admitting Physician     Answer:   Asa Rom     Order Specific Question:   Level of Care     Answer:   Level 2 Stepdown / HOT [14]     Order Specific Question:   Estimated length of stay     Answer:   More than 2 Midnights     Order Specific Question:   Certification     Answer:   I certify that inpatient services are medically necessary for this patient for a duration of greater than two midnights  See H&P and MD Progress Notes for additional information about the patient's course of treatment         Vital Signs: /77   Pulse 96   Temp 99 °F (37 2 °C) (Oral)   Resp 20   Ht 5' 8" (1 727 m)   Wt 109 kg (240 lb)   LMP 01/30/2018   SpO2 96%   BMI 36 49 kg/m²   02/22/18 1200  98 2 °F (36 8 °C)  96  18  109/63  118/68  82 mmHg  95 %  None (Room air)  --   02/22/18 1145  --  102  21  113/59  132/76  92 mmHg  95 %  --  --   02/22/18 1130  --  94  19  103/52  112/56  72 mmHg  95 %  --  --   02/22/18 1115  --  98  19  108/60  124/60  80 mmHg  95 %  --  --   02/22/18 1100  --   106  21  117/60  132/66  84 mmHg  98 %  None (Room air)  --   02/22/18 1057  97 6 °F (36 4 °C)  103  14  131/67  --  --  98 %  Simple mask  --   02/22/18 1053  97 6 °F (36 4 °C)  102  21  117/62  134/66  --  98 %  Simple mask  X   02/22/18 0621  100 3 °F (37 9 °C)  86  18  146/97  --  --  99 %           Diet:        Diet Orders            Start     Ordered    02/22/18 1236  Diet Regular; Regular House  Diet effective now     Question Answer Comment   Diet Type Regular    Regular Regular House    RD to adjust diet per protocol? Yes        02/22/18 1235          Mobility: AMBULATE/OOB    DVT Prophylaxis: SEQ COMP DEVICE    Pain Control:   Pain Medications             FLUoxetine (PROzac) 20 MG tablet TAKE 1 TABLET 2x daily        More recently presented to the hospital with headaches, dizziness   That prompted workup which demonstrated a left neck mass   On MRI of the neck as well as CTA of the neck this appears to be a mass contiguous with the exiting C7 nerve roots i e  Likely a schwannoma   It displaces the carotid anterior medially, the jugular anterior laterally, the vertebral artery posteriorly, and is in continuity with the exiting nerve root      Plan was for surgical excision (anticipated subtotal resection) as co-surgeon with Dr Dee Yuan   We can then offer focused radiation/radiosurgery as adjuvant therapy to residual to prevent recurrence, will be handled through [facility]     Risks personally reviewed with patient and her mother:  Stroke from vessel injury which could result in speech difficulty, right-sided hai paresis/plegia, or brainstem injury, dysphagia, vagal nerve injury, and exiting cervical nerve root injury causing weakness, numbness, or dysesthetic pain   Alternatives reviewed  Agreed to proceed   Consent obtained        2/23:  Progress Note - Surgical Oncology   Javad  26 y o  female MRN: [id number]  Unit/Bed#: [facility] 623-01 Encounter: [id number]     Assessment:  26 F with left neck neurogenic tumor s/p left neck mass excision, lymph node excision POD 1     Plan:  Regular diet  Saline lock  Continue left neck CRISTIANO drain  Plan to discharge home with VNA for left neck drain  Lovenox to start today     OUTPT NC BED [email or web address]   UPGRADED TO [email or web address]  REG DIET  OOB   AMBULATE  IS  C&DB  SEQ COMP DEVICE  Scheduled Meds:  Current Facility-Administered Medications:  acetaminophen 650 mg Oral Q6H PRN Luciano Mckoy MD    docusate sodium 100 mg Oral BID Luciano Mckoy MD    enoxaparin 40 mg Subcutaneous Daily Luciano Mckoy MD    ethynodiol-ethinyl estradiol 1 tablet Oral Daily Luciano Mckoy MD    FLUoxetine 20 mg Oral BID Luciano Mckoy MD    HYDROcodone-acetaminophen 1 tablet Oral Q6H PRN Luciano Mckoy MD    HYDROcodone-acetaminophen 2 tablet Oral Q6H PRN Luciano Mckoy MD    HYDROmorphone 0 5 mg Intravenous Q2H PRN Luciano Mckoy MD    ibuprofen 400 mg Oral Q6H PRN Luciano Mckoy MD    loratadine 10 mg Oral Daily PRN Luciano Mckoy MD    LORazepam 0 5 mg Oral Q8H PRN Luciano Mckoy MD    ondansetron 4 mg Intravenous Q6H PRN Luciano Mckoy MD    senna 1 tablet Oral Daily Luciano Mckoy MD    sodium chloride 50 mL/hr Intravenous Continuous Luciano Mckoy MD Last Rate: 50 mL/hr (02/22/18 1127)   valACYclovir 500 mg Oral Daily Luciano Mckoy MD      Continuous Infusions:  sodium chloride 50 mL/hr Last Rate: 50 mL/hr (02/22/18 1127)     PRN Meds:   acetaminophen    HYDROcodone-acetaminophen    HYDROcodone-acetaminophen    HYDROmorphone X4    ibuprofen    loratadine    LORazepam   ondansetron

## 2018-02-23 NOTE — PLAN OF CARE
DISCHARGE PLANNING     Discharge to home or other facility with appropriate resources Completed        INFECTION - ADULT     Absence or prevention of progression during hospitalization Completed     Absence of fever/infection during neutropenic period Completed        Knowledge Deficit     Patient/family/caregiver demonstrates understanding of disease process, treatment plan, medications, and discharge instructions Completed        PAIN - ADULT     Verbalizes/displays adequate comfort level or baseline comfort level Completed        SAFETY ADULT     Patient will remain free of falls Completed     Maintain or return to baseline ADL function Completed     Maintain or return mobility status to optimal level Completed

## 2018-02-23 NOTE — PROGRESS NOTES
Progress Note - Surgical Oncology   Manjinder Perez 26 y o  female MRN: [id number]  Unit/Bed#: [facility] 623-01 Encounter: [id number]    Assessment:  26 F with left neck neurogenic tumor s/p left neck mass excision, lymph node excision POD 1    Plan:  Regular diet  Saline lock  Continue left neck CRISTIANO drain  Plan to discharge home with VNA for left neck drain  Lovenox to start today    Subjective/Objective     Subjective: No acute events  Pain is controlled  Tolerating diet, passing gas but no bowel movement yet  Objective:    Blood pressure 129/73, pulse (!) 107, temperature 99 3 °F (37 4 °C), resp  rate 20, height 5' 8" (1 727 m), weight 109 kg (240 lb), last menstrual period 01/30/2018, SpO2 100 %  ,Body mass index is 36 49 kg/m²        Intake/Output Summary (Last 24 hours) at 02/23/18 0604  Last data filed at 02/23/18 0546   Gross per 24 hour   Intake             1380 ml   Output             2760 ml   Net            -1380 ml       Invasive Devices     Peripheral Intravenous Line            Peripheral IV 02/22/18 Left Hand less than 1 day    Peripheral IV 02/22/18 Right Hand less than 1 day          Drain            Closed/Suction Drain Left Neck Bulb 19 Fr  less than 1 day                Physical Exam:   General: NAD, AAOx3  Left neck incision c/d/i, CRISTIANO in place with serosanguinous drainage  CV: RRR +S1/S2  Chest: breath sounds bilaterally  Abdomen: soft, NT ND  Extremities: atraumatic, no edema

## 2018-02-25 NOTE — CASE MANAGEMENT
Notification of Discharge  This is a Notification of Discharge from our facility [address]  Please be advised that this patient has been discharge from our facility  Below you will find the admission and discharge date and time including the patients disposition  PRESENTATION DATE: 2/22/2018  5:35 AM  IP ADMISSION DATE: 2/22/18 1408  DISCHARGE DATE: 2/23/2018 12:15 PM  DISPOSITION: Home with [address] [place] [address] in the [facility] by Jamal Flowers for 2017  Network Utilization Review Department  Phone: [phone]; Fax [phone]  ATTENTION: The Network Utilization Review Department is now centralized for our 7 Facilities  Make a note that we have a new phone and fax numbers for our Department  Please call with any questions or concerns to [phone] and carefully follow the prompts so that you are directed to the right person  All voicemails are confidential  Fax any determinations, approvals, denials, and requests for initial or continue stay review clinical to [phone]  Due to HIGH CALL volume, it would be easier if you could please send faxed requests to expedite your requests and in part, help us provide discharge notifications faster

## 2018-02-26 ENCOUNTER — TRANSITIONAL CARE MANAGEMENT (OUTPATIENT)
Dept: FAMILY MEDICINE CLINIC | Facility: CLINIC | Age: 23
End: 2018-02-26

## 2018-02-26 LAB
ABO GROUP BLD BPU: NORMAL
ABO GROUP BLD BPU: NORMAL
BPU ID: NORMAL
BPU ID: NORMAL
UNIT DISPENSE STATUS: NORMAL
UNIT DISPENSE STATUS: NORMAL
UNIT PRODUCT CODE: NORMAL
UNIT PRODUCT CODE: NORMAL
UNIT RH: NORMAL
UNIT RH: NORMAL

## 2018-02-28 ENCOUNTER — DOCUMENTATION (OUTPATIENT)
Dept: SURGICAL ONCOLOGY | Facility: CLINIC | Age: 23
End: 2018-02-28

## 2018-02-28 DIAGNOSIS — R22.1 NECK MASS: ICD-10-CM

## 2018-02-28 RX ORDER — HYDROCODONE BITARTRATE AND ACETAMINOPHEN 5; 325 MG/1; MG/1
1 TABLET ORAL EVERY 6 HOURS PRN
Status: ACTIVE | OUTPATIENT
Start: 2018-02-28

## 2018-02-28 NOTE — PROGRESS NOTES
Pt here for drain pull  Scant serosanguinous drainage being collected daily  Pt tolerated drain pull well, no complications  DSD applied  Pt will f/u with Dr Elena Garcia in 1-2 weeks as scheduled

## 2018-03-01 ENCOUNTER — OFFICE VISIT (OUTPATIENT)
Dept: FAMILY MEDICINE CLINIC | Facility: CLINIC | Age: 23
End: 2018-03-01

## 2018-03-01 VITALS — TEMPERATURE: 97.3 F | HEART RATE: 82 BPM | SYSTOLIC BLOOD PRESSURE: 128 MMHG | DIASTOLIC BLOOD PRESSURE: 86 MMHG

## 2018-03-01 DIAGNOSIS — T38.4X5A ADVERSE EFFECT OF ORAL CONTRACEPTIVE, INITIAL ENCOUNTER: ICD-10-CM

## 2018-03-01 DIAGNOSIS — D49.2 NERVE SHEATH TUMOR: ICD-10-CM

## 2018-03-01 DIAGNOSIS — R51.9 INCREASED FREQUENCY OF HEADACHES: ICD-10-CM

## 2018-03-01 DIAGNOSIS — Z00.00 HEALTHCARE MAINTENANCE: ICD-10-CM

## 2018-03-01 DIAGNOSIS — R22.1 NECK MASS: Primary | ICD-10-CM

## 2018-03-01 PROCEDURE — 99496 TRANSJ CARE MGMT HIGH F2F 7D: CPT | Performed by: FAMILY MEDICINE

## 2018-03-01 RX ORDER — NORETHINDRONE ACETATE AND ETHINYL ESTRADIOL 1MG-20(21)
1 KIT ORAL DAILY
Qty: 28 TABLET | Refills: 2 | Status: SHIPPED | OUTPATIENT
Start: 2018-03-01 | End: 2018-05-03 | Stop reason: SDUPTHER

## 2018-03-01 NOTE — PROGRESS NOTES
FAMILY PRACTICE OFFICE VISIT       NAME: Ankush Cintron  AGE: 26 y o  SEX: female       : 1995        MRN: [id number]    DATE: 3/1/2018  TIME: 8:59 PM    Assessment and Plan     Problem List Items Addressed This Visit     Neck mass - Primary    Nerve sheath tumor      Other Visit Diagnoses     Healthcare maintenance        Relevant Medications    norethindrone-ethinyl estradiol (JUNEL FE 1/20) 1-20 MG-MCG per tablet    Increased frequency of headaches        Adverse effect of oral contraceptive, initial encounter           Patient presents for follow-up after recent hospitalization for removal of left lateral neck mass  Diagnosis is consistent with schwannoma  She remains under ongoing care of Surgical Oncology and Neurosurgery  She is feeling well and is recovering after surgery as expected  She will likely require 1 radiation therapy due to significant size of her tumor  Patient is complaining of worsening headaches ever since dose of her birth control pill was increased by prior PCP  She will be following up with [facility] regarding birth control pill management in the long-term  She reportedly has been experiencing breakthrough bleeding and menstrual cramping on the lower birth control pill formulation  I explained patient that her headaches are likely triggered by OCPs  For the time being I suggested to decrease dose of her estrogen from 35 mcg daily to 20 mcg daily  I am hopeful that her headaches will be improving on the lower dose of BCP but she may unfortunately experience breakthrough bleeding and dysmenorrhea symptoms on the new formulation  Patient will be following up with [facility] to discuss it further  We discussed alternative forms of contraception including IUD or many pill  She will discuss further with her gyn provider  Otherwise she will remain under care of Surgical Oncology Neurosurgery and will follow up with  PCP as needed    There are no Patient Instructions on file for this visit  Chief Complaint     Chief Complaint   Patient presents with    Transition of Care Management     Pt was admitted to hospital for surgery to remove a tumor in neck  Date and time hospital follow up call was made:  2/26/2018  8:57 AM  Hospital care reviewed:  Records reviewed  Patient was hopsitalized at:  [facility]  Date of admission:  2/22/18  Date of discharge:  2/23/18  Diagnosis:  Neck Mass  Were the patients medicaitons reviewed and updated:  Yes  Current symptoms:  Incisional pain  Incisional pain severity:  Mild  Incisional pain onset:  Ongoing  Post hospital issues:  None  Should patient be enrolled in anticoag monitoring?:  No  Scheduled for follow up?:  Yes  Referrals needed:  Dr Karen Morrissey and Dr Karon Perez  Did you obtain your prescribed medications:  Yes  Do you need help managing your perscriptions or medications:  No  Is transportation to your appointments needed:  No  I have advised the patient to call PCP with any new or worsening symptoms (please type in name along with any credentials):  Meredith Montenegro CMA  Living Arrangements:  Spouse or Significiant other  Support System:  Partner  The type of support provided:  Emotional, Physical, Financial  Do you have social support:  Yes, as much as I need  Are you recieving outpatient services:  No  Are you recieving home care services:  No  Are you using any community resources:  No  Current waiver service:  No  Interperter language line required?:  No  Counseling:  Patient  Counseling topics:  Importance of RX compliance  Comments:  Apt  Scheduled for 03/01/2018 @ 9am         History of Present Illness   Patient presents for follow-up after recent hospitalization, she underwent surgery for left neck mass, reportedly biopsy is  benign consistent with schwannoma  Patient remains under care of Surgical Oncology and Neurosurgery    She will likely require 1 course of radiation therapy due to significant size of her tumor   Palmer Rooney was removed earlier today  She is feeling well, complains of fatigue, no fever, no difficulty breathing or swallowing  Patient had scratchy throat after general anesthesia, has improved significantly  Her appetite is improving, she is consuming soft diet  Incision is clean, dry  Patient uses ibuprofen for pain daytime and is still on Vicodin as needed in the evening at night  No constipation or abdominal discomfort  Patient is concerned about worsening of headaches evidence since she started new birth control pill of 35 mcg estrogen  She was originally evaluated emergency room due to worsening of headache and had CT scan of head done which incidentally revealed left-sided neck mass  Brain CT was normal   Patient recalls that she has been using lower dose of birth control pill that cause some breakthrough bleeding and heavy a menstrual cramping but she did not experience headaches on the lower dose birth control pill  She denies any symptoms of aura  HPI    Review of Systems   Review of Systems   Constitutional: Positive for fatigue  Negative for fever  HENT: Positive for sore throat  Eyes: Negative  Respiratory: Negative  Gastrointestinal: Negative  Genitourinary: Negative  Musculoskeletal: Negative  Neurological: Positive for facial asymmetry, numbness (Localized numbness at the site of incision left lateral neck) and headaches  Negative for speech difficulty  Patient has developed Ulysses syndrome postop,  Mild, improving   Psychiatric/Behavioral: Negative          Active Problem List     Patient Active Problem List   Diagnosis    Depression    Chronic fatigue    Depression with anxiety    Vitamin D deficiency    Scoliosis    Epigastric pain    Asthma    Hemangioma    Sleep apnea    Weight gain    Neck mass    Nerve sheath tumor       Past Medical History:  Past Medical History:   Diagnosis Date    Hemangioma of lip     at [de-identified] old with chemo, surgery and tracheostomy    New onset of headaches     Sexually transmitted disease (STD)        Past Surgical History:  Past Surgical History:   Procedure Laterality Date    FACIAL/NECK BIOPSY Left 2/22/2018    Procedure: excision of left neck neurogenic tumor;  Surgeon: Nancy Jimenez MD;  [facility];  Service: Surgical Oncology    NECK SURGERY      SURGERY OF LIP      TONSILLECTOMY AND ADENOIDECTOMY      TRACHEOSTOMY         Family History:  Family History   Problem Relation Age of Onset    Hemochromatosis Father     Lung cancer Maternal Grandfather     Bone cancer Paternal Grandmother     Lung cancer Paternal Grandfather     No Known Problems Mother        Social History:  Social History     Social History    Marital status: Single     Spouse name: N/A    Number of children: N/A    Years of education: N/A     Occupational History    Not on file  Social History Main Topics    Smoking status: Former Smoker    Smokeless tobacco: Never Used    Alcohol use 2 4 oz/week     4 Glasses of wine per week      Comment: occasional    Drug use: No    Sexual activity: Yes     Birth control/ protection: OCP     Other Topics Concern    Not on file     Social History Narrative    No narrative on file     I have reviewed the patient's medical history in detail; there are no changes to the history as noted in the electronic medical record  Objective     Vitals:    03/01/18 1656   BP: 128/86   Pulse: 82   Temp: (!) 97 3 °F (36 3 °C)     Wt Readings from Last 3 Encounters:   02/22/18 109 kg (240 lb)   02/14/18 113 kg (249 lb)   01/26/18 111 kg (245 lb)       Physical Exam   Constitutional: She is oriented to person, place, and time  She appears well-developed and well-nourished  HENT:   Head: Normocephalic and atraumatic  Mouth/Throat: Oropharynx is clear and moist  No oropharyngeal exudate (Minor oropharyngeal erythema, postnasal drip)     Eyes: Conjunctivae are normal  Pupils are equal, round, and reactive to light  Neck: Neck supple  Carotid bruit is not present  Well-healing incision left lateral neck   Cardiovascular: Normal rate, regular rhythm and normal heart sounds  No murmur heard  Pulmonary/Chest: Effort normal and breath sounds normal    Musculoskeletal: Normal range of motion  Neurological: She is alert and oriented to person, place, and time  Minimal droop of left upper eyelid   Psychiatric: She has a normal mood and affect  Her behavior is normal    Nursing note and vitals reviewed        Pertinent Laboratory/Diagnostic Studies:  Lab Results   Component Value Date    GLUCOSE 117 02/23/2018    BUN 7 02/23/2018    CREATININE 0 41 (L) 02/23/2018    CALCIUM 8 5 02/23/2018     02/23/2018    K 3 2 (L) 02/23/2018    CO2 23 02/23/2018     02/23/2018     Lab Results   Component Value Date    ALT 27 01/16/2018    AST 9 01/16/2018    ALKPHOS 121 (H) 01/16/2018    BILITOT 0 26 01/16/2018       Lab Results   Component Value Date    WBC 16 16 (H) 02/23/2018    HGB 12 2 02/23/2018    HCT 35 7 02/23/2018    MCV 92 02/23/2018     02/23/2018       No results found for: TSH    Lab Results   Component Value Date    CHOL 167 08/28/2017     Lab Results   Component Value Date    TRIG 110 08/28/2017     Lab Results   Component Value Date    HDL 60 08/28/2017     Lab Results   Component Value Date    LDLCALC 85 08/28/2017     Lab Results   Component Value Date    HGBA1C 4 8 02/14/2018       Results for orders placed or performed during the hospital encounter of 02/22/18   CBC   Result Value Ref Range    WBC 16 16 (H) 4 31 - 10 16 Thousand/uL    RBC 3 90 3 81 - 5 12 Million/uL    Hemoglobin 12 2 11 5 - 15 4 g/dL    Hematocrit 35 7 34 8 - 46 1 %    MCV 92 82 - 98 fL    MCH 31 3 26 8 - 34 3 pg    MCHC 34 2 31 4 - 37 4 g/dL    RDW 13 0 11 6 - 15 1 %    Platelets 684 089 - 500 Thousands/uL    MPV 9 4 8 9 - 12 7 fL   Basic metabolic panel   Result Value Ref Range    Sodium 139 136 - 145 mmol/L    Potassium 3 2 (L) 3 5 - 5 3 mmol/L    Chloride 108 100 - 108 mmol/L    CO2 23 21 - 32 mmol/L    Anion Gap 8 4 - 13 mmol/L    BUN 7 5 - 25 mg/dL    Creatinine 0 41 (L) 0 60 - 1 30 mg/dL    Glucose 117 65 - 140 mg/dL    Calcium 8 5 8 3 - 10 1 mg/dL    eGFR 147 ml/min/1 73sq m   POCT pregnancy, urine   Result Value Ref Range    EXT Preg Test, Ur Negative Negative   Type and screen   Result Value Ref Range    ABO Grouping A     Rh Factor Positive     Antibody Screen Negative     Specimen Expiration Date [id number]    Prepare RBC:Has consent been obtained? Yes; Date of Surgery: 2/22/2018 (0730); Where is the Surgery Scheduled? [facility]   Result Value Ref Range    Unit Product Code Y5614G32     Unit Number [id number]     Unit ABO A     Unit [facility] POS     Unit Dispense Status Return to [facility]     Unit Product Code H6143J37     Unit Number [id number]     Unit ABO A     Unit [facility] POS     Unit Dispense Status Return to [facility]    Tissue Exam   Result Value Ref Range    Case Report       Surgical Pathology Report                         Case: [id number]                                   Authorizing Provider:  Dempsey Fabry, MD           Collected:           02/22/2018 6580              Ordering Location:     [address] [place] [address]      Received:            02/22/2018 [place] [address] Operating Room                                                      Pathologist:           Michelle Jo MD                                                                Specimens:   A) - Lymph Node, Left Level 3                                                                       B) - Mass, left neck mass  short superior, long lateral                                    Final Diagnosis       A  Left level 3 lymph node (excision):     - Reactive lymph node  B  Left neck mass (excision):     - Findings compatible with schwannoma        Microscopic Description       - Immunohistochemical studies (with appropriate controls) demonstrate:     - Part A: - B cells highlighted with CD20  T cells highlighted with CD3, CD5, BCL2, CD43  - Germinal centers highlighted with [address]  BCL2 is negative in germinal centers  - CKAE1/3 is negative  BCL1 is positive in few scattered cells  CD30 is positive in scattered small cells  - Part B: Positive S100  Negative SMA, beta-catenin (cytoplasmic expression), CKAE1/3  Note       Interpretation performed at [facility], [address] [place] [address]  Intradepartmental consultation concurs with the diagnosis  Additional Information       All controls performed with the immunohistochemical stains reported above reacted appropriately  These tests were developed and their performance characteristics determined by [facility] or [facility]  They may not be cleared or approved by the [place]  Food and Drug Administration  The FDA has determined that such clearance or approval is not necessary  These tests are used for clinical purposes  They should not be regarded as investigational or for research  This laboratory has been approved by CLIA 88, designated as a high-complexity laboratory and is qualified to perform these tests  Gross Description       A  The specimen is received in formalin, labeled with the patient's name and hospital number, and is designated "lymph node left level 3  The specimen consists of a tan-pink rubbery, nodular soft tissue fragment measuring 1 8 cm  The fragment is bisected and entirely submitted for histologic examination in 2 cassettes  B  The specimen is received in formalin, labeled with the patient's name and hospital number, and is designated "left neck mass  The specimen consists of a tan purple rubbery, nodular soft tissue fragment measuring 6 3 x 3 5 x 3 4 cm  The specimen surface exhibits multiple areas of fragmentation    The specimen is marked by 2 sutures which according to the requisition slip short represents superior and long represents lateral   The specimen is inked as follows:  anterior-green, posterior-black, superior-orange, inferior-blue, medial-yellow and lateral-red  The specimen is sectioned in a superior to inferior progression revealing tan rubbery multinodular appearing cut surfaces throughout the entire specimen  Focal areas of hemorrhage are noted  The mass appears to involve the entire specimen; the mass extends to within less than 1 mm of the each of the inked margins  Separately submitted in the specimen container are 3 tan rubbery soft tissue fragments each measuring 0 5-0 6 cm  Representative sections  Seven cassettes  1:  Mass approaching superior margin  2:  Mass approaching inferior margin  3:  Mass approaching medial margin  4:  Mass approaching lateral margin  5:  Mass approaching posterior margin  6:  Mass approaching anterior margin  7: 3 separately submitted nodular soft tissue fragments    Note: The estimated total formalin fixation time based upon information provided by the submitting clinician and the standard processing schedule is under 72 hours  MCrites        Clinical Information Neck mass    Fingerstick Glucose (POCT)   Result Value Ref Range    POC Glucose 121 65 - 140 mg/dl       No orders of the defined types were placed in this encounter        ALLERGIES:  No Known Allergies    Current Medications     Current Outpatient Prescriptions   Medication Sig Dispense Refill    FLUoxetine (PROzac) 20 MG tablet TAKE 1 TABLET 2x daily  11    HYDROcodone-acetaminophen (NORCO) 5-325 mg per tablet Take 2 tablets by mouth every 6 (six) hours as needed for pain for up to 10 days Max Daily Amount: 8 tablets 30 tablet 0    loratadine (CLARITIN) 10 mg tablet Take 1 tablet by mouth daily as needed      LORazepam (ATIVAN) 0 5 mg tablet Take 1 tablet by mouth every 8 (eight) hours as needed        valACYclovir (VALTREX) 500 mg tablet Take 1 tablet (500 mg total) by mouth daily for 90 days 90 tablet 3    norethindrone-ethinyl estradiol (JUNEL FE 1/20) 1-20 MG-MCG per tablet Take 1 tablet by mouth daily 28 tablet 2     No current facility-administered medications for this visit        Facility-Administered Medications Ordered in Other Visits   Medication Dose Route Frequency Provider Last Rate Last Dose    HYDROcodone-acetaminophen (NORCO) 5-325 mg per tablet 1 tablet  1 tablet Oral Q6H PRN Surinder Puckett MD             Health Maintenance     Health Maintenance   Topic Date Due    HIV SCREENING  1995    PAP SMEAR  1996    PNEUMOCOCCAL POLYSACCHARIDE VACCINE AGE 2-64 HIGH RISK  02/07/1998    Depression Screening PHQ-9  02/07/2008    DTaP,Tdap,and Td Vaccines (7 - Td) 02/15/2026    INFLUENZA VACCINE  Completed     Immunization History   Administered Date(s) Administered     Influenza (IM) Preservative Free 02/15/2016    DTaP 5 1996, 1996, 1996, 08/19/1997, 02/09/2000    HPV Quadrivalent 05/11/2007, 08/08/2007, 02/15/2016    Hep B, adult 1995, 1996, 1996    Hib (PRP-OMP) 1996, 1996, 1996, 08/19/1997    IPV 1996, 1996, 08/19/1997, 02/09/2000    Influenza 11/01/2010    Influenza Quadrivalent Preservative Free 3 years and older IM 10/12/2017    Influenza TIV (IM) 10/28/2014, 10/15/2016    MMR 08/19/1997, 02/09/2000    Meningococcal, Unknown Serogroups 05/11/2007    Pneumococcal Conjugate 13-Valent 11/01/2000    Tdap 02/15/2016    Tuberculin Skin Test-PPD Intradermal 02/05/2016, 02/15/2016    Varicella 08/19/1997       Katie Vital MD

## 2018-03-05 RX ORDER — HYDROCODONE BITARTRATE AND ACETAMINOPHEN 5; 325 MG/1; MG/1
1 TABLET ORAL EVERY 6 HOURS PRN
Qty: 30 TABLET | Refills: 0 | Status: SHIPPED | OUTPATIENT
Start: 2018-03-05 | End: 2018-03-15

## 2018-03-06 ENCOUNTER — OFFICE VISIT (OUTPATIENT)
Dept: SURGICAL ONCOLOGY | Facility: CLINIC | Age: 23
End: 2018-03-06
Payer: COMMERCIAL

## 2018-03-06 VITALS
HEIGHT: 68 IN | DIASTOLIC BLOOD PRESSURE: 82 MMHG | RESPIRATION RATE: 16 BRPM | BODY MASS INDEX: 36.37 KG/M2 | WEIGHT: 240 LBS | TEMPERATURE: 99.5 F | HEART RATE: 98 BPM | SYSTOLIC BLOOD PRESSURE: 128 MMHG

## 2018-03-06 DIAGNOSIS — D36.10 SCHWANNOMA: Primary | ICD-10-CM

## 2018-03-06 PROCEDURE — 99024 POSTOP FOLLOW-UP VISIT: CPT | Performed by: SURGERY

## 2018-03-06 RX ORDER — ETHYNODIOL DIACETATE AND ETHINYL ESTRADIOL 1 MG-35MCG
KIT ORAL
Refills: 4 | COMMUNITY
Start: 2018-02-27 | End: 2018-05-06 | Stop reason: ALTCHOICE

## 2018-03-06 NOTE — PROGRESS NOTES
Surgical Oncology Follow Up       [address] [facility],[address]  [facility] [address] [address]  [phone]    Noah Alegria  1995  [id number]      Diagnoses and all orders for this visit:    Schwannoma  -     MRI soft tissue neck wo and w contrast; Future  -     BUN; Future  -     Creatinine, serum; Future    Other orders  -     [facility] 1/35 1-35 MG-MCG per tablet; 1 ORAL TAKE AS DIRECTED        Chief Complaint   Patient presents with    Post-op     Pt here for post-op visit  No history exists  Staging:   Treatment history:  Excision of left neck mass  Current treatment:  Observation  Disease status:  ANAMARIA    History of Present Illness:    Patient returns after excision of her neck mass  Pathology revealed schwannoma  She is still having some pain at the site  Her dysphagia has essentially resolved  She also feels like she is breathing easier as well  No fevers or chills  She also has occasional paresthesias by the wound  Review of Systems   Skin: Positive for wound  Complete ROS Surg Onc:   Complete ROS Surg Onc:   Constitutional: The patient denies new or recent history of general fatigue, no recent weight loss, no change in appetite  Eyes: No complaints of visual problems, no scleral icterus  ENT: no complaints of ear pain, no hoarseness, no difficulty swallowing,  no tinnitus and no new masses in head, oral cavity, or neck  Cardiovascular: No complaints of chest pain, no palpitations, no ankle edema  Respiratory: No complaints of shortness of breath, no cough  Gastrointestinal: No complaints of jaundice, no bloody stools, no pale stools  Genitourinary: No complaints of dysuria, no hematuria, no nocturia, no frequent urination, no urethral discharge  Musculoskeletal: No complaints of weakness, paralysis, joint stiffness or arthralgias  Integumentary: No complaints of rash, no new lesions  Neurological: No complaints of convulsions, no seizures, no dizziness  Hematologic/Lymphatic: No complaints of easy bruising  Endocrine:  No hot or cold intolerance  No polydipsia, polyphagia, or polyuria  Allergy/immunology:  No environmental allergies  No food allergies  Not immunocompromised  Skin:  No pallor or rash  Surgical wound  Patient Active Problem List   Diagnosis    Depression    Chronic fatigue    Depression with anxiety    Vitamin D deficiency    Scoliosis    Epigastric pain    Asthma    Hemangioma    Sleep apnea    Weight gain    Neck mass    Nerve sheath tumor    Schwannoma     Past Medical History:   Diagnosis Date    Hemangioma of lip     at [de-identified] old with chemo, surgery and tracheostomy    New onset of headaches     Sexually transmitted disease (STD)      Past Surgical History:   Procedure Laterality Date    FACIAL/NECK BIOPSY Left 2/22/2018    Procedure: excision of left neck neurogenic tumor;  Surgeon: Georgie Hashimoto, MD;  [facility];  Service: Surgical Oncology    NECK SURGERY      SURGERY OF LIP      TONSILLECTOMY AND ADENOIDECTOMY      TRACHEOSTOMY       Family History   Problem Relation Age of Onset    Hemochromatosis Father     Lung cancer Maternal Grandfather     Bone cancer Paternal Grandmother     Lung cancer Paternal Grandfather     No Known Problems Mother      Social History     Social History    Marital status: Single     Spouse name: N/A    Number of children: N/A    Years of education: N/A     Occupational History    Not on file       Social History Main Topics    Smoking status: Former Smoker    Smokeless tobacco: Never Used    Alcohol use 2 4 oz/week     4 Glasses of wine per week      Comment: occasional    Drug use: No    Sexual activity: Yes     Birth control/ protection: OCP     Other Topics Concern    Not on file     Social History Narrative    No narrative on file       Current Outpatient Prescriptions:   FLUoxetine (PROzac) 20 MG tablet, TAKE 1 TABLET 2x daily, Disp: , Rfl: 11    HYDROcodone-acetaminophen (NORCO) 5-325 mg per tablet, Take 1 tablet by mouth every 6 (six) hours as needed for pain for up to 10 days Earliest Fill Date: 3/5/18, Disp: 30 tablet, Rfl: 0    KELNOR 1/35 1-35 MG-MCG per tablet, 1 ORAL TAKE AS DIRECTED, Disp: , Rfl: 4    loratadine (CLARITIN) 10 mg tablet, Take 1 tablet by mouth daily as needed, Disp: , Rfl:     LORazepam (ATIVAN) 0 5 mg tablet, Take 1 tablet by mouth every 8 (eight) hours as needed  , Disp: , Rfl:     norethindrone-ethinyl estradiol (JUNEL FE 1/20) 1-20 MG-MCG per tablet, Take 1 tablet by mouth daily, Disp: 28 tablet, Rfl: 2    valACYclovir (VALTREX) 500 mg tablet, Take 1 tablet (500 mg total) by mouth daily for 90 days, Disp: 90 tablet, Rfl: 3  No current facility-administered medications for this visit  Facility-Administered Medications Ordered in Other Visits:     HYDROcodone-acetaminophen (NORCO) 5-325 mg per tablet 1 tablet, 1 tablet, Oral, Q6H PRN, Sergio Bruno MD  No Known Allergies  Vitals:    03/06/18 1421   BP: 128/82   Pulse: 98   Resp: 16   Temp: 99 5 °F (37 5 °C)       Physical Exam  Constitutional: General appearance: The Patient is well-developed and well-nourished who appears the stated age in no acute distress  Patient is pleasant and talkative  HEENT:  Normocephalic  Sclerae are anicteric  Mucous membranes are moist  Neck is supple without adenopathy  No JVD  Neck incision has healed well  No erythema, drainage, or tenderness  Extremities: There is no clubbing or cyanosis  There is no edema  Symmetric  Neuro: Grossly nonfocal  Gait is normal      Lymphatic: No evidence of cervical adenopathy bilaterally  Skin: Warm, anicteric  Psych:  Patient is pleasant and talkative    Breasts:        Pathology:  Surgical Pathology Report                         Case: [id number]                                    Authorizing Provider: Sueellen Habermann, MD           Collected:           02/22/2018 0903               Ordering Location:     [facility]      Received:            02/22/2018 1202                                      Hospital Operating Room                                                       Pathologist:           Alaina De La Torre MD                                                                 Specimens:   A) - Lymph Node, Left Level 3                                                                        B) - Mass, left neck mass  short superior, long lateral                                    Final Diagnosis   A  Left level 3 lymph node (excision):     - Reactive lymph node      B  Left neck mass (excision):     - Findings compatible with schwannoma  Electronically signed by Alaina De La Torre MD on 2/27/2018 at 11:24 AM   Preliminary result electronically signed by Alaina De La Torre MD on 2/26/2018 at 12:31 PM         Labs:      Imaging  Xr Chest Portable    Result Date: 2/23/2018  Narrative: CHEST INDICATION: s/p neck surgery COMPARISON:  None EXAM PERFORMED/VIEWS:  XR CHEST PORTABLE IMAGES:  1 FINDINGS: Drain in the left neck  Low lung volumes accentuate the cardiac mediastinal silhouette and pulmonary vasculature  No focal airspace consolidation  No large pleural effusion or demonstrable pneumothorax  Impression: No focal airspace consolidation  Drain in the left neck  Workstation performed: [id number]     I reviewed the above laboratory and imaging data  Discussion/Summary:   71-year-old female status post excision of a schwannoma  This was excised, but this was not taken into the nerve root  She is feeling well  I would recommend that she have observation rather than any adjuvant therapy at this time  I will see her again in 6 months with a repeat MRI  She is seeing Dr Yumiko Meredith next week  All her questions were answered      This office visit took 15 minutes of face-to-face time with the patient greater than 30 minute time spent counseling coordinate care for her new diagnosis of schwannoma

## 2018-03-06 NOTE — LETTER
March 6, 2018     Patient: Javad    YOB: 1995   Date of Visit: 3/6/2018       To Whom it May Concern:    Skip Akhtar is under my professional care  She was seen in my office on 3/6/2018  She may return to work full-duty on March 19, 2018  If you have any questions or concerns, please don't hesitate to call           Sincerely,          Harris Jo MD        CC: No Recipients

## 2018-03-06 NOTE — LETTER
March 6, 2018     Felix Booth MD  [address] [facility] [address]    Patient: Grady Karina   YOB: 1995   Date of Visit: 3/6/2018       Dear Dr Ofelia Ann: Thank you for referring Rashmi Diaz to me for evaluation  Below are my notes for this consultation  If you have questions, please do not hesitate to call me  I look forward to following your patient along with you  Sincerely,        Jasbir Burrows MD        CC: MD Jasbir Gonzales MD  3/6/2018  2:52 PM  Sign at close encounter               Surgical Oncology Follow Up       [address] [place] [id number]  [phone]    [facility]  1995  [id number]      Diagnoses and all orders for this visit:    Schwannoma  -     MRI soft tissue neck wo and w contrast; Future  -     BUN; Future  -     Creatinine, serum; Future    Other orders  -     [facility] 1/35 1-35 MG-MCG per tablet; 1 ORAL TAKE AS DIRECTED        Chief Complaint   Patient presents with    Post-op     Pt here for post-op visit  No history exists  Staging:   Treatment history:  Excision of left neck mass  Current treatment:  Observation  Disease status:  ANAMARIA    History of Present Illness:    Patient returns after excision of her neck mass  Pathology revealed schwannoma  She is still having some pain at the site  Her dysphagia has essentially resolved  She also feels like she is breathing easier as well  No fevers or chills  She also has occasional paresthesias by the wound  Review of Systems   Skin: Positive for wound  Complete ROS Surg Onc:   Complete ROS Surg Onc:   Constitutional: The patient denies new or recent history of general fatigue, no recent weight loss, no change in appetite  Eyes: No complaints of visual problems, no scleral icterus     ENT: no complaints of ear pain, no hoarseness, no difficulty swallowing,  no tinnitus and no new masses in head, oral cavity, or neck  Cardiovascular: No complaints of chest pain, no palpitations, no ankle edema  Respiratory: No complaints of shortness of breath, no cough  Gastrointestinal: No complaints of jaundice, no bloody stools, no pale stools  Genitourinary: No complaints of dysuria, no hematuria, no nocturia, no frequent urination, no urethral discharge  Musculoskeletal: No complaints of weakness, paralysis, joint stiffness or arthralgias  Integumentary: No complaints of rash, no new lesions  Neurological: No complaints of convulsions, no seizures, no dizziness  Hematologic/Lymphatic: No complaints of easy bruising  Endocrine:  No hot or cold intolerance  No polydipsia, polyphagia, or polyuria  Allergy/immunology:  No environmental allergies  No food allergies  Not immunocompromised  Skin:  No pallor or rash  Surgical wound          Patient Active Problem List   Diagnosis    Depression    Chronic fatigue    Depression with anxiety    Vitamin D deficiency    Scoliosis    Epigastric pain    Asthma    Hemangioma    Sleep apnea    Weight gain    Neck mass    Nerve sheath tumor    Schwannoma     Past Medical History:   Diagnosis Date    Hemangioma of lip     at [de-identified] old with chemo, surgery and tracheostomy    New onset of headaches     Sexually transmitted disease (STD)      Past Surgical History:   Procedure Laterality Date    FACIAL/NECK BIOPSY Left 2/22/2018    Procedure: excision of left neck neurogenic tumor;  Surgeon: Dempsey Fabry, MD;  [facility];  Service: Surgical Oncology    NECK SURGERY      SURGERY OF LIP      TONSILLECTOMY AND ADENOIDECTOMY      TRACHEOSTOMY       Family History   Problem Relation Age of Onset    Hemochromatosis Father     Lung cancer Maternal Grandfather     Bone cancer Paternal Grandmother     Lung cancer Paternal Grandfather     No Known Problems Mother      Social History     Social History  Marital status: Single     Spouse name: N/A    Number of children: N/A    Years of education: N/A     Occupational History    Not on file  Social History Main Topics    Smoking status: Former Smoker    Smokeless tobacco: Never Used    Alcohol use 2 4 oz/week     4 Glasses of wine per week      Comment: occasional    Drug use: No    Sexual activity: Yes     Birth control/ protection: OCP     Other Topics Concern    Not on file     Social History Narrative    No narrative on file       Current Outpatient Prescriptions:     FLUoxetine (PROzac) 20 MG tablet, TAKE 1 TABLET 2x daily, Disp: , Rfl: 11    HYDROcodone-acetaminophen (NORCO) 5-325 mg per tablet, Take 1 tablet by mouth every 6 (six) hours as needed for pain for up to 10 days Earliest Fill Date: 3/5/18, Disp: 30 tablet, Rfl: 0    KELNOR 1/35 1-35 MG-MCG per tablet, 1 ORAL TAKE AS DIRECTED, Disp: , Rfl: 4    loratadine (CLARITIN) 10 mg tablet, Take 1 tablet by mouth daily as needed, Disp: , Rfl:     LORazepam (ATIVAN) 0 5 mg tablet, Take 1 tablet by mouth every 8 (eight) hours as needed  , Disp: , Rfl:     norethindrone-ethinyl estradiol (JUNEL FE 1/20) 1-20 MG-MCG per tablet, Take 1 tablet by mouth daily, Disp: 28 tablet, Rfl: 2    valACYclovir (VALTREX) 500 mg tablet, Take 1 tablet (500 mg total) by mouth daily for 90 days, Disp: 90 tablet, Rfl: 3  No current facility-administered medications for this visit  Facility-Administered Medications Ordered in Other Visits:     HYDROcodone-acetaminophen (NORCO) 5-325 mg per tablet 1 tablet, 1 tablet, Oral, Q6H PRN, Grady Frye MD  No Known Allergies  Vitals:    03/06/18 1421   BP: 128/82   Pulse: 98   Resp: 16   Temp: 99 5 °F (37 5 °C)       Physical Exam  Constitutional: General appearance: The Patient is well-developed and well-nourished who appears the stated age in no acute distress  Patient is pleasant and talkative  HEENT:  Normocephalic  Sclerae are anicteric   Mucous membranes are moist  Neck is supple without adenopathy  No JVD  Neck incision has healed well  No erythema, drainage, or tenderness  Extremities: There is no clubbing or cyanosis  There is no edema  Symmetric  Neuro: Grossly nonfocal  Gait is normal      Lymphatic: No evidence of cervical adenopathy bilaterally  Skin: Warm, anicteric  Psych:  Patient is pleasant and talkative  Breasts:        Pathology:  Surgical Pathology Report                         Case: [id number]                                    Authorizing Provider: July Smith MD           Collected:           02/22/2018 0903               Ordering Location:     [facility]      Received:            02/22/2018 1202                                      Hospital Operating Room                                                       Pathologist:           Juan Carlos Amezquita MD                                                                 Specimens:   A) - Lymph Node, Left Level 3                                                                        B) - Mass, left neck mass  short superior, long lateral                                    Final Diagnosis   A  Left level 3 lymph node (excision):     - Reactive lymph node      B  Left neck mass (excision):     - Findings compatible with schwannoma  Electronically signed by Juan Carlos Amezquita MD on 2/27/2018 at 11:24 AM   Preliminary result electronically signed by Juan Carlos Amezquita MD on 2/26/2018 at 12:31 PM         Labs:      Imaging  Xr Chest Portable    Result Date: 2/23/2018  Narrative: CHEST INDICATION: s/p neck surgery COMPARISON:  None EXAM PERFORMED/VIEWS:  XR CHEST PORTABLE IMAGES:  1 FINDINGS: Drain in the left neck  Low lung volumes accentuate the cardiac mediastinal silhouette and pulmonary vasculature  No focal airspace consolidation  No large pleural effusion or demonstrable pneumothorax  Impression: No focal airspace consolidation  Drain in the left neck   Workstation performed: [id number]     I reviewed the above laboratory and imaging data  Discussion/Summary:   20-year-old female status post excision of a schwannoma  This was excised, but this was not taken into the nerve root  She is feeling well  I would recommend that she have observation rather than any adjuvant therapy at this time  I will see her again in 6 months with a repeat MRI  She is seeing Dr Castro Baltazar next week  All her questions were answered

## 2018-03-16 ENCOUNTER — OFFICE VISIT (OUTPATIENT)
Dept: NEUROSURGERY | Facility: CLINIC | Age: 23
End: 2018-03-16

## 2018-03-16 VITALS
TEMPERATURE: 98.2 F | HEIGHT: 68 IN | SYSTOLIC BLOOD PRESSURE: 130 MMHG | DIASTOLIC BLOOD PRESSURE: 80 MMHG | HEART RATE: 68 BPM

## 2018-03-16 DIAGNOSIS — D36.10 SCHWANNOMA: Primary | ICD-10-CM

## 2018-03-16 PROCEDURE — 99024 POSTOP FOLLOW-UP VISIT: CPT | Performed by: NEUROLOGICAL SURGERY

## 2018-03-16 RX ORDER — NORETHINDRONE ACETATE AND ETHINYL ESTRADIOL 1; 20 MG/1; UG/1
1 TABLET ORAL DAILY
Refills: 2 | COMMUNITY
Start: 2018-03-01 | End: 2018-07-19 | Stop reason: ALTCHOICE

## 2018-03-16 NOTE — PROGRESS NOTES
Patient ID: Dejan Howe is a 26 y o  female    Assessment/Plan:    Diagnoses and all orders for this visit:    Schwannoma    Other orders  -     JUNEL 1/20 1-20 MG-MCG per tablet; Take 1 tablet by mouth daily      Discussion/Summary:      25year-old woman history of lip hemangioma resected at [facility] as a child, had a trach postop, that was removed few years later      presented to the hospital with headaches, dizziness  That prompted workup which demonstrated a left neck mass  On MRI of the neck as well as CTA of the neck mass  was contiguous with the exiting C6, C7 nerve roots i e  a schwannoma  It displaced the carotid anterior medially, the jugular anterior laterally, the vertebral artery posteriorly, and was in continuity with the exiting nerve roots      Patient now s/p surgical excision (subtotal resection) of left neck mass as co-surgeon with Dr Thad Delaney  Pathology is schwannoma  She has no left UE symptoms postop  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, 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  She has anterior neck numbness and pins and needles, which should resolve with time  Her incision is healing well  She can return to work part-time next week, then full-time after that      We will be able to offer focused radiation/radiosurgery as adjuvant therapy to her residual to prevent/treat recurrence if occurs  Dr Thad Delaney plans for a repeat MRI in 6 months  We can evaluate that scan at that time for residual, and discuss further adjuvant RT (e g  SRS/SRT) vs  Serial observation  Should be seen at [facility]  Metrics: EQ5D5L 66857, VAS 65, KPS 90, ECOG 0       Preop: EQ5D5L:  111 3 4, or 0 661, VA S 60-70, KPS 90, ECOG 0         Reason for Visit: Post-op (2 wk path discussion)      Chief Complaint: Patient presents for 2 wk path discussion    HPI:     Review of Systems    The following portions of the patient's history were reviewed and updated as appropriate: as below        Active Ambulatory Problems     Diagnosis Date Noted    Depression 01/17/2018    Chronic fatigue 09/25/2015    Depression with anxiety 01/19/2018    Vitamin D deficiency 04/01/2014    Scoliosis 09/11/2012    Epigastric pain 09/18/2017    Asthma 09/11/2012    Hemangioma 09/11/2012    Sleep apnea 12/19/2016    Weight gain 09/27/2015    Neck mass 01/24/2018    Nerve sheath tumor 01/30/2018    Schwannoma 03/06/2018     Resolved Ambulatory Problems     Diagnosis Date Noted    Supraclavicular mass 01/17/2018    History of excision of hemangioma 01/17/2018    Chronic nonintractable headache 01/17/2018    Sexually transmitted disease (STD) 02/14/2017     Past Medical History:   Diagnosis Date    Hemangioma of lip     New onset of headaches     Sexually transmitted disease (STD)        Past Surgical History:   Procedure Laterality Date    FACIAL/NECK BIOPSY Left 2/22/2018    Procedure: excision of left neck neurogenic tumor;  Surgeon: Fabian Prado MD;  [facility];  Service: Surgical Oncology    NECK SURGERY      SURGERY OF LIP      TONSILLECTOMY AND ADENOIDECTOMY      TRACHEOSTOMY         History   Smoking Status    Former Smoker   Smokeless Tobacco    Never Used       History   Alcohol Use    2 4 oz/week    4 Glasses of wine per week     Comment: occasional       History   Drug Use No       Vitals:    03/16/18 0815   BP: 130/80   Pulse: 68   Temp: 98 2 °F (36 8 °C)         Current Outpatient Prescriptions:     FLUoxetine (PROzac) 20 MG tablet, TAKE 1 TABLET 2x daily, Disp: , Rfl: 11    KELNOR 1/35 1-35 MG-MCG per tablet, 1 ORAL TAKE AS DIRECTED, Disp: , Rfl: 4    loratadine (CLARITIN) 10 mg tablet, Take 1 tablet by mouth daily as needed, Disp: , Rfl:     LORazepam (ATIVAN) 0 5 mg tablet, Take 1 tablet by mouth every 8 (eight) hours as needed  , Disp: , Rfl:     valACYclovir (VALTREX) 500 mg tablet, Take 1 tablet (500 mg total) by mouth daily for 90 days, Disp: 90 tablet, Rfl: 3    JUNEL 1/20 1-20 MG-MCG per tablet, Take 1 tablet by mouth daily, Disp: , Rfl: 2    norethindrone-ethinyl estradiol (JUNEL FE 1/20) 1-20 MG-MCG per tablet, Take 1 tablet by mouth daily, Disp: 28 tablet, Rfl: 2  No current facility-administered medications for this visit  Facility-Administered Medications Ordered in Other Visits:     HYDROcodone-acetaminophen ([facility]) 5-325 mg per tablet 1 tablet, 1 tablet, Oral, Q6H PRN, Harris Jo MD      Physical Exam      Results/Data:      Case Report   Surgical Pathology Report                         Case: [id number]                                    Authorizing Provider: Harris Jo MD           Collected:           02/22/2018 0903               Ordering Location:     [facility]      Received:            02/22/2018 [facility]                                      Hospital Operating Room                                                       Pathologist:           Barber Troy MD                                                                 Specimens:   A) - Lymph Node, Left Level 3                                                                        B) - Mass, left neck mass  short superior, long lateral                                    Final Diagnosis   A  Left level 3 lymph node (excision):     - Reactive lymph node      B  Left neck mass (excision):     - Findings compatible with schwannoma     Electronically signed by Barber Troy MD on 2/27/2018 at 11:24 AM   Preliminary result electronically signed by Barber Troy MD on 2/26/2018 at 12:31 PM

## 2018-03-23 ENCOUNTER — OFFICE VISIT (OUTPATIENT)
Dept: SURGICAL ONCOLOGY | Facility: CLINIC | Age: 23
End: 2018-03-23

## 2018-03-23 VITALS
RESPIRATION RATE: 18 BRPM | WEIGHT: 260 LBS | HEIGHT: 68 IN | TEMPERATURE: 98.7 F | DIASTOLIC BLOOD PRESSURE: 90 MMHG | BODY MASS INDEX: 39.4 KG/M2 | HEART RATE: 98 BPM | SYSTOLIC BLOOD PRESSURE: 138 MMHG

## 2018-03-23 DIAGNOSIS — D36.10 SCHWANNOMA: ICD-10-CM

## 2018-03-23 DIAGNOSIS — R22.1 NECK SWELLING: Primary | ICD-10-CM

## 2018-03-23 PROCEDURE — 99024 POSTOP FOLLOW-UP VISIT: CPT | Performed by: NURSE PRACTITIONER

## 2018-03-23 NOTE — PROGRESS NOTES
Surgical Oncology Follow Up       [address]  [facility] [address] Trell Grace  1995  [id number]  [address]  [facility] [id number]    Chief Complaint   Patient presents with    Post-op       1  Schwannoma      2  Neck swelling  - Ibuprofen 600 mg PO TID PRN  - Rest  - Call with worsening symptoms or no improvement  - Report to ED with further neck swelling, difficulty breathing       Discussion/Summary: Patient is a 25 y/o female who underwent an excision of a left neck schwannoma by Dr Thad Delaney on 2/22/18  She states she had been recovering well but returned to work this week and picked up extra shifts and has now noticed an increase in swelling of the neck  She states that it was a little painful, but now it is mostly discomfort  She did take an Aleve which did not provide much relief  She denies any SOB or difficulty breathing or difficulty swallowing  On exam, there is some edema present but no discrete fluid collection  There is no s/s of infection  We discussed that her increase of swelling is probably a result of increased activity  I have recommended rest and Motrin PRN- up to 600 mg TID  I've instructed her to report to the ED with any acute SOB or difficulty breathing  I've instructed her to call if her symptoms worsen or do not improve  She and her mother are in agreement with the plan  All of their questions were answered  History of Present Illness:     -Interval History: Patient presents today for a visit with complaints of neck swelling s/p excision of left neck schwannoma by Dr Thad Delaney on 2/22/18  She states she had been recovering well but had to  extra shifts this week and has now noticed an increase in swelling of the neck  Review of Systems:  Review of Systems   Constitutional: Negative for chills and fever  HENT: Negative for trouble swallowing and voice change  Respiratory: Negative for shortness of breath and stridor  Skin: Negative for rash  Patient Active Problem List   Diagnosis    Depression    Chronic fatigue    Depression with anxiety    Vitamin D deficiency    Scoliosis    Epigastric pain    Asthma    Hemangioma    Sleep apnea    Weight gain    Neck mass    Nerve sheath tumor    Schwannoma     Past Medical History:   Diagnosis Date    Hemangioma of lip     at [de-identified] old with chemo, surgery and tracheostomy    New onset of headaches     Sexually transmitted disease (STD)      Past Surgical History:   Procedure Laterality Date    FACIAL/NECK BIOPSY Left 2/22/2018    Procedure: excision of left neck neurogenic tumor;  Surgeon: Seferino Landis MD;  [facility];  Service: Surgical Oncology    NECK SURGERY      SURGERY OF LIP      TONSILLECTOMY AND ADENOIDECTOMY      TRACHEOSTOMY       Family History   Problem Relation Age of Onset    Hemochromatosis Father     Lung cancer Maternal Grandfather     Bone cancer Paternal Grandmother     Lung cancer Paternal Grandfather     No Known Problems Mother      Social History     Social History    Marital status: Single     Spouse name: N/A    Number of children: N/A    Years of education: N/A     Occupational History    Not on file       Social History Main Topics    Smoking status: Former Smoker    Smokeless tobacco: Never Used    Alcohol use 2 4 oz/week     4 Glasses of wine per week      Comment: occasional    Drug use: No    Sexual activity: Yes     Birth control/ protection: OCP     Other Topics Concern    Not on file     Social History Narrative    No narrative on file       Current Outpatient Prescriptions:     FLUoxetine (PROzac) 20 MG tablet, TAKE 1 TABLET 2x daily, Disp: , Rfl: 11    JUNEL 1/20 1-20 MG-MCG per tablet, Take 1 tablet by mouth daily, Disp: , Rfl: 2    KELNOR 1/35 1-35 MG-MCG per tablet, 1 ORAL TAKE AS DIRECTED, Disp: , Rfl: 4    loratadine (CLARITIN) 10 mg tablet, Take 1 tablet by mouth daily as needed, Disp: , Rfl:     LORazepam (ATIVAN) 0 5 mg tablet, Take 1 tablet by mouth every 8 (eight) hours as needed  , Disp: , Rfl:     norethindrone-ethinyl estradiol (JUNEL FE 1/20) 1-20 MG-MCG per tablet, Take 1 tablet by mouth daily, Disp: 28 tablet, Rfl: 2    valACYclovir (VALTREX) 500 mg tablet, Take 1 tablet (500 mg total) by mouth daily for 90 days, Disp: 90 tablet, Rfl: 3  No current facility-administered medications for this visit  Facility-Administered Medications Ordered in Other Visits:     HYDROcodone-acetaminophen (NORCO) 5-325 mg per tablet 1 tablet, 1 tablet, Oral, Q6H PRN, Harris Jo MD  No Known Allergies  Vitals:    03/23/18 1539   BP: 138/90   Pulse: 98   Resp: 18   Temp: 98 7 °F (37 1 °C)       Physical Exam   Constitutional: She appears well-developed and well-nourished  No distress  HENT:   Head: Normocephalic and atraumatic  Neck: No tracheal deviation present  Left neck incision well healed, intact  No drainage or erythema present  Mild edema noted in left neck- no discrete fluid collection palpated  No acute distress  Pulmonary/Chest: No stridor  Vitals reviewed  Advance Care Planning/Advance Directives:  Discussed disease status and treatment goals with the patient

## 2018-05-03 DIAGNOSIS — Z00.00 HEALTHCARE MAINTENANCE: ICD-10-CM

## 2018-05-04 NOTE — TELEPHONE ENCOUNTER
Please clarify with patient, I believe she should be receiving this medication from her gynecologist?

## 2018-05-05 NOTE — TELEPHONE ENCOUNTER
Patient states that she has not seen her gynecologist for this prescription  She saw you for migraine headaches and at that time her birth control was changed to the junel  Please advise

## 2018-05-06 RX ORDER — NORETHINDRONE ACETATE AND ETHINYL ESTRADIOL 1; 20 MG/1; UG/1
1 TABLET ORAL DAILY
Qty: 21 TABLET | Refills: 0 | Status: SHIPPED | OUTPATIENT
Start: 2018-05-06 | End: 2018-07-19 | Stop reason: ALTCHOICE

## 2018-05-06 NOTE — TELEPHONE ENCOUNTER
I refilled birth control pills for 1 pack and no refills  Patient does have follow-up with [facility] later this month  I would appreciate if all further refills will come from them    Thank you

## 2018-05-23 ENCOUNTER — APPOINTMENT (OUTPATIENT)
Dept: LAB | Facility: HOSPITAL | Age: 23
End: 2018-05-23
Payer: COMMERCIAL

## 2018-05-23 ENCOUNTER — TRANSCRIBE ORDERS (OUTPATIENT)
Dept: ADMINISTRATIVE | Facility: HOSPITAL | Age: 23
End: 2018-05-23

## 2018-05-23 DIAGNOSIS — R53.83 FATIGUE, UNSPECIFIED TYPE: Primary | ICD-10-CM

## 2018-05-23 DIAGNOSIS — Z00.00 ROUTINE GENERAL MEDICAL EXAMINATION AT A HEALTH CARE FACILITY: ICD-10-CM

## 2018-05-23 DIAGNOSIS — N92.6 IRREGULAR MENSTRUAL CYCLE: ICD-10-CM

## 2018-05-23 DIAGNOSIS — R53.83 FATIGUE, UNSPECIFIED TYPE: ICD-10-CM

## 2018-05-23 DIAGNOSIS — E88.81 DYSMETABOLIC SYNDROME X: ICD-10-CM

## 2018-05-23 LAB
25(OH)D3 SERPL-MCNC: 14.4 NG/ML (ref 30–100)
ALBUMIN SERPL BCP-MCNC: 3.4 G/DL (ref 3.5–5)
ALP SERPL-CCNC: 140 U/L (ref 46–116)
ALT SERPL W P-5'-P-CCNC: 38 U/L (ref 12–78)
ANION GAP SERPL CALCULATED.3IONS-SCNC: 10 MMOL/L (ref 4–13)
AST SERPL W P-5'-P-CCNC: 19 U/L (ref 5–45)
B-HCG SERPL-ACNC: <2 MIU/ML
BASOPHILS # BLD AUTO: 0.02 THOUSANDS/ΜL (ref 0–0.1)
BASOPHILS NFR BLD AUTO: 0 % (ref 0–1)
BILIRUB SERPL-MCNC: 0.2 MG/DL (ref 0.2–1)
BUN SERPL-MCNC: 9 MG/DL (ref 5–25)
CALCIUM SERPL-MCNC: 8.6 MG/DL (ref 8.3–10.1)
CHLORIDE SERPL-SCNC: 103 MMOL/L (ref 100–108)
CO2 SERPL-SCNC: 25 MMOL/L (ref 21–32)
CREAT SERPL-MCNC: 0.52 MG/DL (ref 0.6–1.3)
EOSINOPHIL # BLD AUTO: 0.12 THOUSAND/ΜL (ref 0–0.61)
EOSINOPHIL NFR BLD AUTO: 1 % (ref 0–6)
ERYTHROCYTE [DISTWIDTH] IN BLOOD BY AUTOMATED COUNT: 12.6 % (ref 11.6–15.1)
EST. AVERAGE GLUCOSE BLD GHB EST-MCNC: 100 MG/DL
FERRITIN SERPL-MCNC: 154 NG/ML (ref 8–388)
GFR SERPL CREATININE-BSD FRML MDRD: 136 ML/MIN/1.73SQ M
GLUCOSE SERPL-MCNC: 95 MG/DL (ref 65–140)
HBA1C MFR BLD: 5.1 % (ref 4.2–6.3)
HCT VFR BLD AUTO: 40.8 % (ref 34.8–46.1)
HGB BLD-MCNC: 13.8 G/DL (ref 11.5–15.4)
IRON SATN MFR SERPL: 29 %
IRON SERPL-MCNC: 93 UG/DL (ref 50–170)
LYMPHOCYTES # BLD AUTO: 3.06 THOUSANDS/ΜL (ref 0.6–4.47)
LYMPHOCYTES NFR BLD AUTO: 35 % (ref 14–44)
MCH RBC QN AUTO: 30.5 PG (ref 26.8–34.3)
MCHC RBC AUTO-ENTMCNC: 33.8 G/DL (ref 31.4–37.4)
MCV RBC AUTO: 90 FL (ref 82–98)
MONOCYTES # BLD AUTO: 0.68 THOUSAND/ΜL (ref 0.17–1.22)
MONOCYTES NFR BLD AUTO: 8 % (ref 4–12)
NEUTROPHILS # BLD AUTO: 4.79 THOUSANDS/ΜL (ref 1.85–7.62)
NEUTS SEG NFR BLD AUTO: 56 % (ref 43–75)
PLATELET # BLD AUTO: 392 THOUSANDS/UL (ref 149–390)
PMV BLD AUTO: 9.5 FL (ref 8.9–12.7)
POTASSIUM SERPL-SCNC: 3.6 MMOL/L (ref 3.5–5.3)
PROT SERPL-MCNC: 7.2 G/DL (ref 6.4–8.2)
RBC # BLD AUTO: 4.53 MILLION/UL (ref 3.81–5.12)
SODIUM SERPL-SCNC: 138 MMOL/L (ref 136–145)
TIBC SERPL-MCNC: 316 UG/DL (ref 250–450)
TSH SERPL DL<=0.05 MIU/L-ACNC: 2.53 UIU/ML (ref 0.36–3.74)
WBC # BLD AUTO: 8.67 THOUSAND/UL (ref 4.31–10.16)

## 2018-05-23 PROCEDURE — 83550 IRON BINDING TEST: CPT

## 2018-05-23 PROCEDURE — 84443 ASSAY THYROID STIM HORMONE: CPT

## 2018-05-23 PROCEDURE — 84702 CHORIONIC GONADOTROPIN TEST: CPT

## 2018-05-23 PROCEDURE — 80053 COMPREHEN METABOLIC PANEL: CPT

## 2018-05-23 PROCEDURE — 82728 ASSAY OF FERRITIN: CPT

## 2018-05-23 PROCEDURE — 82306 VITAMIN D 25 HYDROXY: CPT

## 2018-05-23 PROCEDURE — 83036 HEMOGLOBIN GLYCOSYLATED A1C: CPT

## 2018-05-23 PROCEDURE — 83540 ASSAY OF IRON: CPT

## 2018-05-23 PROCEDURE — 36415 COLL VENOUS BLD VENIPUNCTURE: CPT

## 2018-05-23 PROCEDURE — 85025 COMPLETE CBC W/AUTO DIFF WBC: CPT

## 2018-05-24 ENCOUNTER — APPOINTMENT (OUTPATIENT)
Dept: LAB | Facility: HOSPITAL | Age: 23
End: 2018-05-24
Payer: COMMERCIAL

## 2018-05-24 DIAGNOSIS — R53.83 FATIGUE, UNSPECIFIED TYPE: ICD-10-CM

## 2018-05-24 DIAGNOSIS — E88.81 DYSMETABOLIC SYNDROME X: ICD-10-CM

## 2018-05-24 DIAGNOSIS — N92.6 IRREGULAR MENSTRUAL CYCLE: ICD-10-CM

## 2018-05-24 DIAGNOSIS — Z00.00 ROUTINE GENERAL MEDICAL EXAMINATION AT A HEALTH CARE FACILITY: ICD-10-CM

## 2018-05-24 LAB
CHOLEST SERPL-MCNC: 187 MG/DL (ref 50–200)
HDLC SERPL-MCNC: 55 MG/DL (ref 40–60)
LDLC SERPL CALC-MCNC: 106 MG/DL (ref 0–100)
NONHDLC SERPL-MCNC: 132 MG/DL
TRIGL SERPL-MCNC: 129 MG/DL

## 2018-05-24 PROCEDURE — 36415 COLL VENOUS BLD VENIPUNCTURE: CPT

## 2018-05-24 PROCEDURE — 80061 LIPID PANEL: CPT

## 2018-06-22 ENCOUNTER — TELEPHONE (OUTPATIENT)
Dept: SURGICAL ONCOLOGY | Facility: CLINIC | Age: 23
End: 2018-06-22

## 2018-07-19 ENCOUNTER — OFFICE VISIT (OUTPATIENT)
Dept: FAMILY MEDICINE CLINIC | Facility: CLINIC | Age: 23
End: 2018-07-19
Payer: COMMERCIAL

## 2018-07-19 VITALS
HEART RATE: 90 BPM | WEIGHT: 264.4 LBS | BODY MASS INDEX: 40.07 KG/M2 | TEMPERATURE: 97.4 F | RESPIRATION RATE: 16 BRPM | SYSTOLIC BLOOD PRESSURE: 148 MMHG | DIASTOLIC BLOOD PRESSURE: 110 MMHG | HEIGHT: 68 IN

## 2018-07-19 DIAGNOSIS — F32.A DEPRESSION, UNSPECIFIED DEPRESSION TYPE: Primary | ICD-10-CM

## 2018-07-19 DIAGNOSIS — R63.5 WEIGHT GAIN: ICD-10-CM

## 2018-07-19 DIAGNOSIS — I10 ESSENTIAL HYPERTENSION: ICD-10-CM

## 2018-07-19 LAB
SL AMB  POCT GLUCOSE, UA: NEGATIVE
SL AMB LEUKOCYTE ESTERASE,UA: NEGATIVE
SL AMB POCT BILIRUBIN,UA: NORMAL
SL AMB POCT BLOOD,UA: NEGATIVE
SL AMB POCT CLARITY,UA: CLEAR
SL AMB POCT COLOR,UA: YELLOW
SL AMB POCT KETONES,UA: NEGATIVE
SL AMB POCT NITRITE,UA: NEGATIVE
SL AMB POCT PH,UA: 5
SL AMB POCT SPECIFIC GRAVITY,UA: 1.01
SL AMB POCT URINE PROTEIN: NEGATIVE
SL AMB POCT UROBILINOGEN: 0.2

## 2018-07-19 PROCEDURE — 81002 URINALYSIS NONAUTO W/O SCOPE: CPT | Performed by: NURSE PRACTITIONER

## 2018-07-19 PROCEDURE — 93000 ELECTROCARDIOGRAM COMPLETE: CPT | Performed by: NURSE PRACTITIONER

## 2018-07-19 PROCEDURE — 99214 OFFICE O/P EST MOD 30 MIN: CPT | Performed by: NURSE PRACTITIONER

## 2018-07-19 RX ORDER — BUPROPION HYDROCHLORIDE 150 MG/1
150 TABLET, EXTENDED RELEASE ORAL 2 TIMES DAILY
Qty: 60 TABLET | Refills: 2 | Status: SHIPPED | OUTPATIENT
Start: 2018-07-19 | End: 2019-03-15 | Stop reason: HOSPADM

## 2018-07-19 RX ORDER — LABETALOL 200 MG/1
200 TABLET, FILM COATED ORAL 2 TIMES DAILY
Qty: 30 TABLET | Refills: 0 | Status: SHIPPED | OUTPATIENT
Start: 2018-07-19 | End: 2018-08-05 | Stop reason: SDUPTHER

## 2018-07-19 RX ORDER — CHOLECALCIFEROL (VITAMIN D3) 1250 MCG
CAPSULE ORAL
Refills: 3 | COMMUNITY
Start: 2018-06-24

## 2018-07-19 NOTE — PROGRESS NOTES
FAMILY PRACTICE OFFICE VISIT       NAME: Tita Saenz  AGE: 26 y o  SEX: female       : 1995        MRN: [id number]    DATE: 2018    Assessment and Plan     Problem List Items Addressed This Visit     Depression - Primary    Relevant Medications    buPROPion (WELLBUTRIN SR) 150 mg 12 hr tablet    Weight gain    Relevant Orders    POCT urine dip (Completed)    Essential hypertension    Relevant Medications    labetalol (NORMODYNE) 200 mg tablet    Other Relevant Orders    POCT ECG (Completed)            Patient Instructions   1  Hypertension: Start labetalol 100 mg twice daily today  On , increase to 200 mg twice daily  Low sodium diet  Exercise  Work on weight loss  Schedule follow up appointment in 2 weeks  2  Depression: restart Wellbutrin 150 mg twice daily  Consider counseling, Pool Bowie is available in our office  1  Depression, unspecified depression type  Continue Fluoxetine 20 mg daily and restart Bupropion 150 mg twice daily  Consider counseling, Pool Bowie is a counselor available in our office  Follow up in 2 weeks  buPROPion (WELLBUTRIN SR) 150 mg 12 hr tablet   2  Essential hypertension  New onset of Hypertension  In office ECG, sinus arrhythmia, no acute ischemia, no changes sin 2018  Urine dip clear  Recent blood work reviewed  Will restart Labetalol 100 mg twice daily today, then starting , she will increase to 200 mg twice daily  History of asthma noted, states this has been well controlled in adulthood and she has not used a rescue inhaler for years  She stopped oral contraception, due to she felt this could contribute elevated BP  She is engaged to be  and currently using the withdrawal method of birth control  She would not be upset if she became pregnant, but is not planning pregnancy  I have recommended she follow up with gynecologist to discuss alternative methods of contraception   She will continue to monitor her BP at work  Written education on DASH diet provided  Recommend diet, regular exercise, and weight loss  Follow up in 2 weeks  labetalol (NORMODYNE) 200 mg tablet    POCT ECG   3  Weight gain  Weight gain 20+ pounds over the past 6 months  Recommend DASH diet and regular exercise  She just started using the '[employer] fitness pal' cielo  She is aware that weight loss will help with BP control  POCT urine dip           Chief Complaint     Chief Complaint   Patient presents with    Hypertension     Patient presents today with hypertesnion  History of Present Illness     Patient presents today for elevated BP  About 2 months ago she checked her BP at work and it was 150's/110  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 and has tried samples of different medications under the direction of one of the physicians  She has tried Methyldopa 250 mg twice daily with no response  Bystolic worked well, but her insurance does not cover this  Labetolol 100 mg twice daily brought her BP down to 130's/80-90  She stopped taking labetolol 2 days ago, feeling it was not working  Admits to gaining a lot of weight over the last few months  Spent a lot of time resting in bed after recent surgery  Has not changed her diet  Tries to eat a low carb diet during the weekdays  She does not exercise on a regular basis  She was going to the gym 4 times per week, now is only going once per week  She does like to hiking  She is also feeling depressed, which she feels likely contributes to her weight  She is currently taking fluoxetine 20 mg daily, which she feels helps with her anxiety, but does not help with depression  She was taking Wellbutrin 150 mg twice daily in addition to Fluoxetine in the past  She stopped Wellbutrin, because she was feeling well  Now, feels she should go back on Wellbutrin  She is having headaches, fatigue, and occasional mild dizziness      She stopped taking oral contraception, due to elevated BP                     Review of Systems   Review of Systems   Constitutional: Positive for unexpected weight change (weight gain)  Negative for chills, fatigue and fever  HENT: Negative  Respiratory: Negative for cough and shortness of breath  Cardiovascular: Negative for chest pain, palpitations and leg swelling  Gastrointestinal: Positive for constipation  Negative for abdominal pain, nausea and vomiting  Genitourinary: Negative  Musculoskeletal: Negative  Skin: Negative  Neurological: Positive for dizziness and headaches  Negative for syncope and weakness  Psychiatric/Behavioral: Positive for dysphoric mood  Negative for suicidal ideas         Active Problem List     Patient Active Problem List   Diagnosis    Depression    Chronic fatigue    Depression with anxiety    Vitamin D deficiency    Scoliosis    Epigastric pain    Asthma    Hemangioma    Sleep apnea    Weight gain    Neck mass    Nerve sheath tumor    Schwannoma    Essential hypertension       Past Medical History:  Past Medical History:   Diagnosis Date    Hemangioma of lip     at [de-identified] old with chemo, surgery and tracheostomy    New onset of headaches     Sexually transmitted disease (STD)        Past Surgical History:  Past Surgical History:   Procedure Laterality Date    FACIAL/NECK BIOPSY Left 2/22/2018    Procedure: excision of left neck neurogenic tumor;  Surgeon: Samina Benavides MD;  [facility];  Service: Surgical Oncology    NECK SURGERY      SURGERY OF LIP      TONSILLECTOMY AND ADENOIDECTOMY      TRACHEOSTOMY         Family History:  Family History   Problem Relation Age of Onset    Hemochromatosis Father     Lung cancer Maternal Grandfather     Bone cancer Paternal Grandmother     Lung cancer Paternal Grandfather     No Known Problems Mother        Social History:  Social History     Social History    Marital status: Single     Spouse name: N/A    Number of children: N/A    Years of education: N/A     Occupational History    Not on file  Social History Main Topics    Smoking status: Former Smoker    Smokeless tobacco: Never Used    Alcohol use 2 4 oz/week     4 Glasses of wine per week      Comment: occasional    Drug use: No    Sexual activity: Yes     Birth control/ protection: OCP     Other Topics Concern    Not on file     Social History Narrative    No narrative on file       I have reviewed the patient's medical history in detail; there are no changes to the history as noted in the electronic medical record  Objective     Vitals:    07/19/18 0833 07/19/18 0922   BP: (!) 148/122 (!) 148/110   BP Location: Left arm    Patient Position: Sitting    Cuff Size: Large    Pulse: 90    Resp: 16    Temp: (!) 97 4 °F (36 3 °C)    TempSrc: Tympanic    Weight: 120 kg (264 lb 6 4 oz)    Height: 5' 8" (1 727 m)      Wt Readings from Last 3 Encounters:   07/19/18 120 kg (264 lb 6 4 oz)   03/23/18 118 kg (260 lb)   03/06/18 109 kg (240 lb)     Body mass index is 40 2 kg/m²  Physical Exam   Constitutional: She appears well-developed and well-nourished  No distress  HENT:   Head: Normocephalic and atraumatic  Right Ear: Tympanic membrane and ear canal normal    Left Ear: Tympanic membrane and ear canal normal    Mouth/Throat: Oropharynx is clear and moist    Eyes: Conjunctivae are normal    Neck: Normal range of motion  Neck supple  Cardiovascular: Normal rate, regular rhythm and normal heart sounds  Pulmonary/Chest: Effort normal and breath sounds normal    Lymphadenopathy:     She has no cervical adenopathy  Psychiatric: She has a normal mood and affect  Nursing note and vitals reviewed        ALLERGIES:  No Known Allergies    Current Medications     Current Outpatient Prescriptions   Medication Sig Dispense Refill    Cholecalciferol (VITAMIN D3) 75900 units CAPS TAKE ONE CAPSULE EVERY SEVEN DAYS  3    FLUoxetine (PROzac) 20 MG tablet TAKE 1 TABLET daily  11    loratadine (CLARITIN) 10 mg tablet Take 1 tablet by mouth daily as needed      LORazepam (ATIVAN) 0 5 mg tablet Take 1 tablet by mouth every 8 (eight) hours as needed        valACYclovir (VALTREX) 500 mg tablet Take 1 tablet (500 mg total) by mouth daily for 90 days (Patient taking differently: Take 500 mg by mouth daily as needed  ) 90 tablet 3    buPROPion (WELLBUTRIN SR) 150 mg 12 hr tablet Take 1 tablet (150 mg total) by mouth 2 (two) times a day 60 tablet 2    labetalol (NORMODYNE) 200 mg tablet Take 1 tablet (200 mg total) by mouth 2 (two) times a day 30 tablet 0     No current facility-administered medications for this visit        Facility-Administered Medications Ordered in Other Visits   Medication Dose Route Frequency Provider Last Rate Last Dose    HYDROcodone-acetaminophen (NORCO) 5-325 mg per tablet 1 tablet  1 tablet Oral Q6H PRN Cachorro Crook MD             Health Maintenance     Health Maintenance   Topic Date Due    HIV SCREENING  1995    PAP SMEAR  1996    PNEUMOCOCCAL POLYSACCHARIDE VACCINE AGE 2-64 HIGH RISK  02/07/1998    INFLUENZA VACCINE  09/01/2018    DTaP,Tdap,and Td Vaccines (7 - Td) 02/15/2026     Immunization History   Administered Date(s) Administered     Influenza (IM) Preservative Free 02/15/2016    DTaP 1996, 1996, 1996, 08/19/1997, 02/09/2000    DTaP 5 1996, 1996, 1996, 08/19/1997, 02/09/2000    HPV Quadrivalent 05/11/2007, 08/08/2007, 11/29/2007, 02/15/2016    Hep B, Adolescent or Pediatric 1996, 1996, 1996    Hep B, adult 1996, 1996, 1996    HiB 1996, 1996, 1996, 08/19/1997    Hib (PRP-OMP) 1996, 1996, 1996, 08/19/1997    IPV 1996, 1996, 08/19/1997, 02/09/2000    Influenza 11/23/2004, 11/29/2007, 11/01/2010    Influenza Quadrivalent Preservative Free 3 years and older IM 10/12/2017    Influenza TIV (IM) 10/28/2014, 10/15/2016    MMR 08/19/1997, 02/09/2000    Meningococcal MCV4P 05/11/2007    Meningococcal, Unknown Serogroups 05/11/2007    Pneumococcal Conjugate 13-Valent 11/01/2000    Tdap 02/15/2016    Tuberculin Skin Test-PPD Intradermal 02/05/2016, 02/15/2016    Varicella 08/19/1997, 06/20/2008       MILTON Ascencio

## 2018-08-02 ENCOUNTER — OFFICE VISIT (OUTPATIENT)
Dept: FAMILY MEDICINE CLINIC | Facility: CLINIC | Age: 23
End: 2018-08-02
Payer: COMMERCIAL

## 2018-08-02 VITALS
DIASTOLIC BLOOD PRESSURE: 88 MMHG | BODY MASS INDEX: 39.83 KG/M2 | HEIGHT: 68 IN | TEMPERATURE: 96 F | HEART RATE: 68 BPM | WEIGHT: 262.8 LBS | RESPIRATION RATE: 16 BRPM | SYSTOLIC BLOOD PRESSURE: 136 MMHG

## 2018-08-02 DIAGNOSIS — N94.6 DYSMENORRHEA: ICD-10-CM

## 2018-08-02 DIAGNOSIS — F32.A DEPRESSION, UNSPECIFIED DEPRESSION TYPE: ICD-10-CM

## 2018-08-02 DIAGNOSIS — K59.00 CONSTIPATION, UNSPECIFIED CONSTIPATION TYPE: ICD-10-CM

## 2018-08-02 DIAGNOSIS — I10 ESSENTIAL HYPERTENSION: Primary | ICD-10-CM

## 2018-08-02 LAB — SL AMB POCT URINE HCG: NEGATIVE

## 2018-08-02 PROCEDURE — 99213 OFFICE O/P EST LOW 20 MIN: CPT | Performed by: NURSE PRACTITIONER

## 2018-08-02 PROCEDURE — 3079F DIAST BP 80-89 MM HG: CPT | Performed by: NURSE PRACTITIONER

## 2018-08-02 PROCEDURE — 81025 URINE PREGNANCY TEST: CPT | Performed by: NURSE PRACTITIONER

## 2018-08-02 PROCEDURE — 3075F SYST BP GE 130 - 139MM HG: CPT | Performed by: NURSE PRACTITIONER

## 2018-08-02 NOTE — PROGRESS NOTES
FAMILY PRACTICE OFFICE VISIT       NAME: Antwon Gardner  AGE: 26 y o  SEX: female       : 1995        MRN: [id number]    DATE: 2018  TIME: 1:58 PM    Assessment and Plan     Problem List Items Addressed This Visit     Depression    Essential hypertension - Primary      Other Visit Diagnoses     Dysmenorrhea        Relevant Orders    POCT urine HCG (Completed)    Constipation, unspecified constipation type                1  Essential hypertension  BP stable on current dose of labetalol 200 mg twice daily  Will continue with same dose  Continue with lifestyle modifications, including working on weight loss  She is planning to start returning to the gym tomorrow with a friend  2  Depression, unspecified depression type  Continue Wellbutrin 150 mg twice daily  Follow up in 1 month  3  Dysmenorrhea  Menstrual cycle is 3 days late  Urine pregnancy test negative  Recently stopped taking OCP in , reviewed with patient it may take a few months for her cycle to regulate  POCT urine HCG   4  Constipation, unspecified constipation type  Constipation the last few days  Recommend trying Miralax daily as needed  Call if constipation is persistent  Chief Complaint     Chief Complaint   Patient presents with    Follow-up     Pt is here for follow up on blood pressure        History of Present Illness     Patient presents today for follow-up on hypertension  Last BP check at work few days ago was 115/83  She is taking labetalol 200 milligrams twice daily as prescribed  Restarted on Wellbutrin about 2 weeks ago for depression  States she has not noticed any improvement in depression yet  Denies any feelings of wanting to harm herself or others  Request urine pregnancy test today due to she is 3 days late for menstrual cycle  Stop taking oral contraceptive pills on   She was on oral birth control for several years      Complains of constipation with intermittent feelings of bloating and nausea for the past few days  She took an over-the-counter product labeled gentle laxative  Last bowel movement was yesterday, but states she feels like she still needs to have a bowel movement, but can't  Review of Systems   Review of Systems   Constitutional: Negative  Respiratory: Negative  Cardiovascular: Negative  Gastrointestinal: Positive for abdominal distention, constipation and nausea  Negative for abdominal pain and vomiting  Genitourinary: Positive for menstrual problem  Neurological: Negative for dizziness  Headaches: few headaches this week, states this is common before her menstrual cycle begins  Psychiatric/Behavioral: Positive for dysphoric mood         Active Problem List     Patient Active Problem List   Diagnosis    Depression    Chronic fatigue    Depression with anxiety    Vitamin D deficiency    Scoliosis    Epigastric pain    Asthma    Hemangioma    Sleep apnea    Weight gain    Neck mass    Nerve sheath tumor    Schwannoma    Essential hypertension       Past Medical History:  Past Medical History:   Diagnosis Date    Hemangioma of lip     at [de-identified] old with chemo, surgery and tracheostomy    New onset of headaches     Sexually transmitted disease (STD)        Past Surgical History:  Past Surgical History:   Procedure Laterality Date    FACIAL/NECK BIOPSY Left 2/22/2018    Procedure: excision of left neck neurogenic tumor;  Surgeon: Yuli Connelly MD;  [facility];  Service: Surgical Oncology    NECK SURGERY      SURGERY OF LIP      TONSILLECTOMY AND ADENOIDECTOMY      TRACHEOSTOMY         Family History:  Family History   Problem Relation Age of Onset    Hemochromatosis Father     Lung cancer Maternal Grandfather     Bone cancer Paternal Grandmother     Lung cancer Paternal Grandfather     No Known Problems Mother        Social History:  Social History     Social History    Marital status: Single     Spouse name: N/A    Number of children: N/A    Years of education: N/A     Occupational History    Not on file  Social History Main Topics    Smoking status: Former Smoker    Smokeless tobacco: Never Used    Alcohol use 2 4 oz/week     4 Glasses of wine per week      Comment: occasional    Drug use: No    Sexual activity: Yes     Birth control/ protection: OCP     Other Topics Concern    Not on file     Social History Narrative    No narrative on file       I have reviewed the patient's medical history in detail; there are no changes to the history as noted in the electronic medical record  Objective     Vitals:    08/02/18 0745 08/02/18 0805   BP:  136/88   Pulse: 68    Resp: 16    Temp: (!) 96 °F (35 6 °C)    TempSrc: Tympanic    Weight: 119 kg (262 lb 12 8 oz)    Height: 5' 8" (1 727 m)      Wt Readings from Last 3 Encounters:   08/02/18 119 kg (262 lb 12 8 oz)   07/19/18 120 kg (264 lb 6 4 oz)   03/23/18 118 kg (260 lb)     Body mass index is 39 96 kg/m²  Physical Exam   Constitutional: She appears well-developed and well-nourished  No distress  HENT:   Head: Normocephalic and atraumatic  Eyes: Conjunctivae are normal    Neck: Normal range of motion  Neck supple  Cardiovascular: Normal rate, regular rhythm and normal heart sounds  Pulmonary/Chest: Effort normal and breath sounds normal    Abdominal: Soft  Bowel sounds are normal    Musculoskeletal: She exhibits no edema  Psychiatric: She has a normal mood and affect  Nursing note and vitals reviewed        ALLERGIES:  No Known Allergies    Current Medications     Current Outpatient Prescriptions   Medication Sig Dispense Refill    buPROPion (WELLBUTRIN SR) 150 mg 12 hr tablet Take 1 tablet (150 mg total) by mouth 2 (two) times a day 60 tablet 2    Cholecalciferol (VITAMIN D3) 03182 units CAPS TAKE ONE CAPSULE EVERY SEVEN DAYS  3    FLUoxetine (PROzac) 20 MG tablet TAKE 1 TABLET daily  11    labetalol (NORMODYNE) 200 mg tablet Take 1 tablet (200 mg total) by mouth 2 (two) times a day 30 tablet 0    loratadine (CLARITIN) 10 mg tablet Take 1 tablet by mouth daily as needed      LORazepam (ATIVAN) 0 5 mg tablet Take 1 tablet by mouth every 8 (eight) hours as needed        valACYclovir (VALTREX) 500 mg tablet Take 1 tablet (500 mg total) by mouth daily for 90 days (Patient taking differently: Take 500 mg by mouth daily as needed  ) 90 tablet 3     No current facility-administered medications for this visit        Facility-Administered Medications Ordered in Other Visits   Medication Dose Route Frequency Provider Last Rate Last Dose    HYDROcodone-acetaminophen (NORCO) 5-325 mg per tablet 1 tablet  1 tablet Oral Q6H PRN Atul Rosa MD             Health Maintenance     Health Maintenance   Topic Date Due    HIV SCREENING  1996    PNEUMOCOCCAL POLYSACCHARIDE VACCINE AGE 2-64 HIGH RISK  02/07/1998    PAP SMEAR  02/07/2017    INFLUENZA VACCINE  09/01/2018    DTaP,Tdap,and Td Vaccines (7 - Td) 02/15/2026     Immunization History   Administered Date(s) Administered     Influenza (IM) Preservative Free 02/15/2016    DTaP 1996, 1996, 1996, 08/19/1997, 02/09/2000    DTaP 5 1996, 1996, 1996, 08/19/1997, 02/09/2000    HPV Quadrivalent 05/11/2007, 08/08/2007, 11/29/2007, 02/15/2016    Hep B, Adolescent or Pediatric 1996, 1996, 1996    Hep B, adult 1996, 1996, 1996    HiB 1996, 1996, 1996, 08/19/1997    Hib (PRP-OMP) 1996, 1996, 1996, 08/19/1997    IPV 1996, 1996, 08/19/1997, 02/09/2000    Influenza 11/23/2004, 11/29/2007, 11/01/2010    Influenza Quadrivalent Preservative Free 3 years and older IM 10/12/2017    Influenza TIV (IM) 10/28/2014, 10/15/2016    MMR 08/19/1997, 02/09/2000    Meningococcal MCV4P 05/11/2007    Meningococcal, Unknown Serogroups 05/11/2007    Pneumococcal Conjugate 13-Valent 11/01/2000    Tdap 02/15/2016    Tuberculin Skin Test-PPD Intradermal 02/05/2016, 02/15/2016    Varicella 08/19/1997, 06/20/2008       MILTON Raygoza

## 2018-08-05 DIAGNOSIS — I10 ESSENTIAL HYPERTENSION: ICD-10-CM

## 2018-08-06 RX ORDER — LABETALOL 200 MG/1
TABLET, FILM COATED ORAL
Qty: 30 TABLET | Refills: 2 | Status: SHIPPED | OUTPATIENT
Start: 2018-08-06 | End: 2018-10-25 | Stop reason: SDUPTHER

## 2018-08-27 ENCOUNTER — HOSPITAL ENCOUNTER (OUTPATIENT)
Dept: RADIOLOGY | Facility: HOSPITAL | Age: 23
Discharge: HOME | End: 2018-08-27
Attending: SURGERY
Payer: COMMERCIAL

## 2018-08-27 DIAGNOSIS — D36.10 SCHWANNOMA: ICD-10-CM

## 2018-08-27 PROCEDURE — A9585 GADOBUTROL INJECTION: HCPCS | Performed by: SURGERY

## 2018-08-27 PROCEDURE — 70543 MRI ORBT/FAC/NCK W/O &W/DYE: CPT

## 2018-08-27 RX ADMIN — GADOBUTROL 10 ML: 604.72 INJECTION INTRAVENOUS at 10:01

## 2018-09-11 ENCOUNTER — OFFICE VISIT (OUTPATIENT)
Dept: SURGICAL ONCOLOGY | Facility: CLINIC | Age: 23
End: 2018-09-11
Payer: COMMERCIAL

## 2018-09-11 VITALS
WEIGHT: 266 LBS | HEIGHT: 68 IN | HEART RATE: 78 BPM | TEMPERATURE: 97.2 F | DIASTOLIC BLOOD PRESSURE: 90 MMHG | SYSTOLIC BLOOD PRESSURE: 120 MMHG | RESPIRATION RATE: 18 BRPM | BODY MASS INDEX: 40.32 KG/M2

## 2018-09-11 DIAGNOSIS — D36.10 SCHWANNOMA: Primary | ICD-10-CM

## 2018-09-11 PROCEDURE — 99214 OFFICE O/P EST MOD 30 MIN: CPT | Performed by: SURGERY

## 2018-09-11 NOTE — PROGRESS NOTES
Surgical Oncology Follow Up       [address] [place] [address]  [facility] [address] [place] [facility]  1995  [id number]  [address] [place] [address]  [facility] [address] [address]    Diagnoses and all orders for this visit:    Schwannoma  -     MRI soft tissue neck wo and w contrast; Future  -     BUN; Future  -     Creatinine, serum; Future        Chief Complaint   Patient presents with    Follow-up     Pt is here for 6 mo f/u  Return in about 6 months (around 3/11/2019) for Office Visit, Imaging - See orders  Oncology History    History of lip hemangioma resected at [facility] as a child, had a trach postop, that was removed few years later      Presented to the hospital with headaches, dizziness   Workup demonstrated a left neck mass   On MRI of the neck as well as CTA of the neck, mass  was contiguous with the exiting C6, C7 nerve roots i e  a schwannoma  2/22/18 underwent surgical excision (subtotal resection) of left neck mass with co-surgeons Dr Saroj Melo and Dr Maikol Go   Pathology is schwannoma       8/27/18    IMPRESSION:     Bulk of bilobed nerve sheath tumor, emerging from the left C6-C7 foramen has been removed  Minor residual soft tissue within the foramen at this level may represent cicatrix  Surveillance imaging suggested                Schwannoma    2/22/2018 Surgery       Case Report   Surgical Pathology Report                         Case: [id number]                                    Authorizing Provider: Danya Davis MD           Collected:           02/22/2018 0903               Ordering Location:     [address]      Received:            02/22/2018 1202                                      Hospital Operating Room                                                       Pathologist:           Matt Becerril MD                                                                 Specimens:   A) - Lymph Node, Left Level 3                                                                        B) - Mass, left neck mass  short superior, long lateral                                     Final Diagnosis   A  Left level 3 lymph node (excision):     - Reactive lymph node      B  Left neck mass (excision):     - Findings compatible with schwannoma  Electronically signed by Bharath Dean MD on 2/27/2018 at               2/2018 Initial Diagnosis     Schwannoma              Treatment history:  Excision of left neck mass/schwannoma  Current treatment:  Observation  Disease status:  ANAMARIA    History of Present Illness:   Patient returns in follow-up of her schwannoma  She is doing well at this time with no complaints  She is no longer having pupil dilation or drooping of her eyelid  The paresthesia sensation in her neck has essentially resolved except for when she works out  Her headaches have also resolved except at the time of her menses  She still has persistent nausea which was occurring pre surgery  MRI from August 27, 2018 reveals that the majority of this lesion was removed  I personally reviewed the films  Review of Systems  Complete ROS Surg Onc:   Complete ROS Surg Onc:   Constitutional: The patient denies new or recent history of general fatigue, no recent weight loss, no change in appetite  Eyes: No complaints of visual problems, no scleral icterus  ENT: no complaints of ear pain, no hoarseness, no difficulty swallowing,  no tinnitus and no new masses in head, oral cavity, or neck  Cardiovascular: No complaints of chest pain, no palpitations, no ankle edema  Respiratory: No complaints of shortness of breath, no cough  Gastrointestinal: No complaints of jaundice, no bloody stools, no pale stools  Genitourinary: No complaints of dysuria, no hematuria, no nocturia, no frequent urination, no urethral discharge     Musculoskeletal: No complaints of weakness, paralysis, joint stiffness or arthralgias  Integumentary: No complaints of rash, no new lesions  Neurological: No complaints of convulsions, no seizures, no dizziness  Hematologic/Lymphatic: No complaints of easy bruising  Endocrine:  No hot or cold intolerance  No polydipsia, polyphagia, or polyuria  Allergy/immunology:  No environmental allergies  No food allergies  Not immunocompromised  Skin:  No pallor or rash  No wound  Patient Active Problem List   Diagnosis    Depression    Chronic fatigue    Depression with anxiety    Vitamin D deficiency    Scoliosis    Epigastric pain    Asthma    Hemangioma    Sleep apnea    Weight gain    Neck mass    Nerve sheath tumor    Schwannoma    Essential hypertension     Past Medical History:   Diagnosis Date    Hemangioma of lip     at [de-identified] old with chemo, surgery and tracheostomy    New onset of headaches     Sexually transmitted disease (STD)      Past Surgical History:   Procedure Laterality Date    FACIAL/NECK BIOPSY Left 2/22/2018    Procedure: excision of left neck neurogenic tumor;  Surgeon: Samina Benavides MD;  [facility];  Service: Surgical Oncology    NECK SURGERY      SURGERY OF LIP      TONSILLECTOMY AND ADENOIDECTOMY      TRACHEOSTOMY       Family History   Problem Relation Age of Onset    Hemochromatosis Father     Lung cancer Maternal Grandfather     Bone cancer Paternal Grandmother     Lung cancer Paternal Grandfather     No Known Problems Mother      Social History     Social History    Marital status: Single     Spouse name: N/A    Number of children: N/A    Years of education: N/A     Occupational History    Not on file       Social History Main Topics    Smoking status: Former Smoker    Smokeless tobacco: Never Used    Alcohol use 2 4 oz/week     4 Glasses of wine per week      Comment: occasional    Drug use: No    Sexual activity: Yes     Birth control/ protection: OCP     Other Topics Concern    Not on file     Social History Narrative    No narrative on file       Current Outpatient Prescriptions:     buPROPion (WELLBUTRIN SR) 150 mg 12 hr tablet, Take 1 tablet (150 mg total) by mouth 2 (two) times a day, Disp: 60 tablet, Rfl: 2    Cholecalciferol (VITAMIN D3) 21357 units CAPS, TAKE ONE CAPSULE EVERY SEVEN DAYS, Disp: , Rfl: 3    FLUoxetine (PROzac) 20 MG tablet, TAKE 1 TABLET daily, Disp: , Rfl: 11    labetalol (NORMODYNE) 200 mg tablet, TAKE 1 TABLET BY MOUTH TWICE A DAY, Disp: 30 tablet, Rfl: 2    loratadine (CLARITIN) 10 mg tablet, Take 1 tablet by mouth daily as needed, Disp: , Rfl:     LORazepam (ATIVAN) 0 5 mg tablet, Take 1 tablet by mouth every 8 (eight) hours as needed  , Disp: , Rfl:     valACYclovir (VALTREX) 500 mg tablet, Take 1 tablet (500 mg total) by mouth daily for 90 days (Patient taking differently: Take 500 mg by mouth daily as needed  ), Disp: 90 tablet, Rfl: 3  No current facility-administered medications for this visit  Facility-Administered Medications Ordered in Other Visits:     HYDROcodone-acetaminophen (NORCO) 5-325 mg per tablet 1 tablet, 1 tablet, Oral, Q6H PRN, Cachorro Crook MD  No Known Allergies  Vitals:    09/11/18 1553   BP: 120/90   Pulse: 78   Resp: 18   Temp: (!) 97 2 °F (36 2 °C)       Physical Exam  Constitutional: General appearance: The Patient is well-developed and well-nourished who appears the stated age in no acute distress  Patient is pleasant and talkative  HEENT:  Normocephalic  Sclerae are anicteric  Mucous membranes are moist  Neck is supple without adenopathy  No JVD  incision has healed well  There is a hypertrophic scar  Chest: The lungs are clear to auscultation  Cardiac: Heart is regular rate  Abdomen: Abdomen is soft, non-tender, non-distended and without masses  Extremities: There is no clubbing or cyanosis  There is no edema  Symmetric    Neuro: Grossly nonfocal  Gait is normal      Lymphatic: No evidence of cervical adenopathy bilaterally  No evidence of axillary adenopathy bilaterally  No evidence of inguinal adenopathy bilaterally  Skin: Warm, anicteric  Psych:  Patient is pleasant and talkative  Breasts:        Pathology:      Labs:      Imaging  Mri Soft Tissue Neck Wo And W Contrast    Result Date: 8/28/2018  Narrative: MRI OF THE SOFT TISSUES OF THE NECK - WITH AND WITHOUT CONTRAST INDICATION: D36 10: Benign neoplasm of peripheral nerves and autonomic nervous system, unspecified COMPARISON:  None  TECHNIQUE:  Sagittal T1, axial DWI and T2  Axial inversion recovery or fat suppressed T2  Axial T1 precontrast   Axial and coronal T1 postcontrast with fat suppression  IV Contrast:  10 mL of gadobutrol injection (MULTI-DOSE) IMAGE QUALITY:  Diagnostic  FINDINGS: VISUALIZED BRAIN PARENCHYMA:  Normal  VISUALIZED ORBITS AND PARANASAL SINUSES:  Normal  NASAL CAVITY AND NASOPHARYNX:  Normal  SUPRAHYOID NECK:  Normal oral cavity, tongue base, tonsillar fossa and epiglottis  Parapharyngeal, , sublingual and submandibular spaces are normal  The amount of fat deep to the left sternocleidomastoid muscle and within the left parapharyngeal space appears asymmetric compared to its counterpart  Lipoma not excluded  INFRAHYOID NECK:  Aryepiglottic folds, laryngeal tissues, and piriform sinuses are normal   Bulk of the paraspinal nerve sheath tumor has been removed  There is a small amount of soft tissue material within the foramen, on the left, the C6 level could represent either cicatrix or possibly, some minimal residual tumor  Surveillance imaging help to make this distinction if there is clinical concern  This soft tissue is in contact with the left vertebral artery as it emerges from its intraspinal course  THYROID GLAND:   Normal  PAROTID AND SUBMANDIBULAR GLANDS:  Appears be slight fatty replacement of the left parotid gland  LYMPH NODES:  No pathologic or enlarged adenopathy   VASCULAR STRUCTURES:  Grossly normal flow voids of the cervical vasculature  THORACIC INLET: Lung apices and upper mediastinum are grossly unremarkable  CERVICAL SPINE:  Normal alignment of the cervical spine  Normal marrow signal   No gross evidence of degenerative disease  Cervical cord is grossly normal in signal      Impression: Bulk of bilobed nerve sheath tumor, emerging from the left C6-C7 foramen has been removed  Minor residual soft tissue within the foramen at this level may represent cicatrix  Surveillance imaging suggested  Prominence of fat within the suprahyoid neck possibly representing lipoma Workstation performed: [id number]     I reviewed the above laboratory and imaging data  Discussion/Summary:   27-year-old female status post excision of a schwannoma  This was completely excised except for at the area of nerve root  She is feeling well  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

## 2018-09-11 NOTE — LETTER
September 11, 2018     Cherylene Quarry, MD  [address] [facility] [address]    Patient: Bernice Jurado   YOB: 1995   Date of Visit: 9/11/2018       Dear Dr Sanjuana Rankin: Thank you for referring Lauren Malin to me for evaluation  Below are my notes for this consultation  If you have questions, please do not hesitate to call me  I look forward to following your patient along with you  Sincerely,        Ayla Thomas MD        CC: MD Ayla Villanueva MD  9/11/2018  4:08 PM  Sign at close encounter               Surgical Oncology Follow Up       [address]  1995  [id number]  [address] [facility] [facility]  [facility] [address] [address]    Diagnoses and all orders for this visit:    Schwannoma  -     MRI soft tissue neck wo and w contrast; Future  -     BUN; Future  -     Creatinine, serum; Future        Chief Complaint   Patient presents with    Follow-up     Pt is here for 6 mo f/u  Return in about 6 months (around 3/11/2019) for Office Visit, Imaging - See orders  Oncology History    History of lip hemangioma resected at [facility] as a child, had a trach postop, that was removed few years later      Presented to the hospital with headaches, dizziness   Workup demonstrated a left neck mass   On MRI of the neck as well as CTA of the neck, mass  was contiguous with the exiting C6, C7 nerve roots i e  a schwannoma  2/22/18 underwent surgical excision (subtotal resection) of left neck mass with co-surgeons Dr Thelma Longoria and Dr Monica Gonsalez   Pathology is schwannoma       8/27/18    IMPRESSION:     Bulk of bilobed nerve sheath tumor, emerging from the left C6-C7 foramen has been removed  Minor residual soft tissue within the foramen at this level may represent cicatrix    Surveillance imaging suggested  Schwannoma    2/22/2018 Surgery       Case Report   Surgical Pathology Report                         Case: [id number]                                    Authorizing Provider: Maida Venegas MD           Collected:           02/22/2018 0903               Ordering Location:     [facility]      Received:            02/22/2018 [facility]                                      Hospital Operating Room                                                       Pathologist:           Rosalie Zhang MD                                                                 Specimens:   A) - Lymph Node, Left Level 3                                                                        B) - Mass, left neck mass  short superior, long lateral                                     Final Diagnosis   A  Left level 3 lymph node (excision):     - Reactive lymph node      B  Left neck mass (excision):     - Findings compatible with schwannoma  Electronically signed by Rosalie Zhang MD on 2/27/2018 at               2/2018 Initial Diagnosis     Schwannoma              Treatment history:  Excision of left neck mass/schwannoma  Current treatment:  Observation  Disease status:  ANAMARIA    History of Present Illness:   Patient returns in follow-up of her schwannoma  She is doing well at this time with no complaints  She is no longer having pupil dilation or drooping of her eyelid  The paresthesia sensation in her neck has essentially resolved except for when she works out  Her headaches have also resolved except at the time of her menses  She still has persistent nausea which was occurring pre surgery  MRI from August 27, 2018 reveals that the majority of this lesion was removed  I personally reviewed the films  Review of Systems  Complete ROS Surg Onc:   Complete ROS Surg Onc:   Constitutional: The patient denies new or recent history of general fatigue, no recent weight loss, no change in appetite     Eyes: No complaints of visual problems, no scleral icterus  ENT: no complaints of ear pain, no hoarseness, no difficulty swallowing,  no tinnitus and no new masses in head, oral cavity, or neck  Cardiovascular: No complaints of chest pain, no palpitations, no ankle edema  Respiratory: No complaints of shortness of breath, no cough  Gastrointestinal: No complaints of jaundice, no bloody stools, no pale stools  Genitourinary: No complaints of dysuria, no hematuria, no nocturia, no frequent urination, no urethral discharge  Musculoskeletal: No complaints of weakness, paralysis, joint stiffness or arthralgias  Integumentary: No complaints of rash, no new lesions  Neurological: No complaints of convulsions, no seizures, no dizziness  Hematologic/Lymphatic: No complaints of easy bruising  Endocrine:  No hot or cold intolerance  No polydipsia, polyphagia, or polyuria  Allergy/immunology:  No environmental allergies  No food allergies  Not immunocompromised  Skin:  No pallor or rash  No wound          Patient Active Problem List   Diagnosis    Depression    Chronic fatigue    Depression with anxiety    Vitamin D deficiency    Scoliosis    Epigastric pain    Asthma    Hemangioma    Sleep apnea    Weight gain    Neck mass    Nerve sheath tumor    Schwannoma    Essential hypertension     Past Medical History:   Diagnosis Date    Hemangioma of lip     at [de-identified] old with chemo, surgery and tracheostomy    New onset of headaches     Sexually transmitted disease (STD)      Past Surgical History:   Procedure Laterality Date    FACIAL/NECK BIOPSY Left 2/22/2018    Procedure: excision of left neck neurogenic tumor;  Surgeon: Ayla Thomas MD;  [facility];  Service: Surgical Oncology    NECK SURGERY      SURGERY OF LIP      TONSILLECTOMY AND ADENOIDECTOMY      TRACHEOSTOMY       Family History   Problem Relation Age of Onset    Hemochromatosis Father     Lung cancer Maternal Grandfather     Bone cancer Paternal Grandmother     Lung cancer Paternal Grandfather     No Known Problems Mother      Social History     Social History    Marital status: Single     Spouse name: N/A    Number of children: N/A    Years of education: N/A     Occupational History    Not on file  Social History Main Topics    Smoking status: Former Smoker    Smokeless tobacco: Never Used    Alcohol use 2 4 oz/week     4 Glasses of wine per week      Comment: occasional    Drug use: No    Sexual activity: Yes     Birth control/ protection: OCP     Other Topics Concern    Not on file     Social History Narrative    No narrative on file       Current Outpatient Prescriptions:     buPROPion (WELLBUTRIN SR) 150 mg 12 hr tablet, Take 1 tablet (150 mg total) by mouth 2 (two) times a day, Disp: 60 tablet, Rfl: 2    Cholecalciferol (VITAMIN D3) 79742 units CAPS, TAKE ONE CAPSULE EVERY SEVEN DAYS, Disp: , Rfl: 3    FLUoxetine (PROzac) 20 MG tablet, TAKE 1 TABLET daily, Disp: , Rfl: 11    labetalol (NORMODYNE) 200 mg tablet, TAKE 1 TABLET BY MOUTH TWICE A DAY, Disp: 30 tablet, Rfl: 2    loratadine (CLARITIN) 10 mg tablet, Take 1 tablet by mouth daily as needed, Disp: , Rfl:     LORazepam (ATIVAN) 0 5 mg tablet, Take 1 tablet by mouth every 8 (eight) hours as needed  , Disp: , Rfl:     valACYclovir (VALTREX) 500 mg tablet, Take 1 tablet (500 mg total) by mouth daily for 90 days (Patient taking differently: Take 500 mg by mouth daily as needed  ), Disp: 90 tablet, Rfl: 3  No current facility-administered medications for this visit  Facility-Administered Medications Ordered in Other Visits:     HYDROcodone-acetaminophen (NORCO) 5-325 mg per tablet 1 tablet, 1 tablet, Oral, Q6H PRN, Doretha Lu MD  No Known Allergies  Vitals:    09/11/18 1553   BP: 120/90   Pulse: 78   Resp: 18   Temp: (!) 97 2 °F (36 2 °C)       Physical Exam  Constitutional: General appearance:  The Patient is well-developed and well-nourished who appears the stated age in no acute distress  Patient is pleasant and talkative  HEENT:  Normocephalic  Sclerae are anicteric  Mucous membranes are moist  Neck is supple without adenopathy  No JVD  incision has healed well  There is a hypertrophic scar  Chest: The lungs are clear to auscultation  Cardiac: Heart is regular rate  Abdomen: Abdomen is soft, non-tender, non-distended and without masses  Extremities: There is no clubbing or cyanosis  There is no edema  Symmetric  Neuro: Grossly nonfocal  Gait is normal      Lymphatic: No evidence of cervical adenopathy bilaterally  No evidence of axillary adenopathy bilaterally  No evidence of inguinal adenopathy bilaterally  Skin: Warm, anicteric  Psych:  Patient is pleasant and talkative  Breasts:        Pathology:      Labs:      Imaging  Mri Soft Tissue Neck Wo And W Contrast    Result Date: 8/28/2018  Narrative: MRI OF THE SOFT TISSUES OF THE NECK - WITH AND WITHOUT CONTRAST INDICATION: D36 10: Benign neoplasm of peripheral nerves and autonomic nervous system, unspecified COMPARISON:  None  TECHNIQUE:  Sagittal T1, axial DWI and T2  Axial inversion recovery or fat suppressed T2  Axial T1 precontrast   Axial and coronal T1 postcontrast with fat suppression  IV Contrast:  10 mL of gadobutrol injection (MULTI-DOSE) IMAGE QUALITY:  Diagnostic  FINDINGS: VISUALIZED BRAIN PARENCHYMA:  Normal  VISUALIZED ORBITS AND PARANASAL SINUSES:  Normal  NASAL CAVITY AND NASOPHARYNX:  Normal  SUPRAHYOID NECK:  Normal oral cavity, tongue base, tonsillar fossa and epiglottis  Parapharyngeal, , sublingual and submandibular spaces are normal  The amount of fat deep to the left sternocleidomastoid muscle and within the left parapharyngeal space appears asymmetric compared to its counterpart  Lipoma not excluded   INFRAHYOID NECK:  Aryepiglottic folds, laryngeal tissues, and piriform sinuses are normal   Bulk of the paraspinal nerve sheath tumor has been removed  There is a small amount of soft tissue material within the foramen, on the left, the C6 level could represent either cicatrix or possibly, some minimal residual tumor  Surveillance imaging help to make this distinction if there is clinical concern  This soft tissue is in contact with the left vertebral artery as it emerges from its intraspinal course  THYROID GLAND:   Normal  PAROTID AND SUBMANDIBULAR GLANDS:  Appears be slight fatty replacement of the left parotid gland  LYMPH NODES:  No pathologic or enlarged adenopathy  VASCULAR STRUCTURES:  Grossly normal flow voids of the cervical vasculature  THORACIC INLET: Lung apices and upper mediastinum are grossly unremarkable  CERVICAL SPINE:  Normal alignment of the cervical spine  Normal marrow signal   No gross evidence of degenerative disease  Cervical cord is grossly normal in signal      Impression: Bulk of bilobed nerve sheath tumor, emerging from the left C6-C7 foramen has been removed  Minor residual soft tissue within the foramen at this level may represent cicatrix  Surveillance imaging suggested  Prominence of fat within the suprahyoid neck possibly representing lipoma Workstation performed: [id number]     I reviewed the above laboratory and imaging data  Discussion/Summary:   23-year-old female status post excision of a schwannoma  This was completely excised except for at the area of nerve root  She is feeling well  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

## 2018-09-12 ENCOUNTER — OFFICE VISIT (OUTPATIENT)
Dept: NEUROSURGERY | Facility: CLINIC | Age: 23
End: 2018-09-12
Payer: COMMERCIAL

## 2018-09-12 VITALS
HEART RATE: 83 BPM | DIASTOLIC BLOOD PRESSURE: 85 MMHG | BODY MASS INDEX: 40.32 KG/M2 | TEMPERATURE: 98.4 F | SYSTOLIC BLOOD PRESSURE: 123 MMHG | RESPIRATION RATE: 16 BRPM | WEIGHT: 266 LBS | HEIGHT: 68 IN

## 2018-09-12 DIAGNOSIS — D36.10 SCHWANNOMA: Primary | ICD-10-CM

## 2018-09-12 PROCEDURE — 99214 OFFICE O/P EST MOD 30 MIN: CPT | Performed by: NEUROLOGICAL SURGERY

## 2018-10-12 ENCOUNTER — OFFICE VISIT (OUTPATIENT)
Dept: FAMILY MEDICINE CLINIC | Facility: CLINIC | Age: 23
End: 2018-10-12
Payer: COMMERCIAL

## 2018-10-12 VITALS
RESPIRATION RATE: 18 BRPM | SYSTOLIC BLOOD PRESSURE: 128 MMHG | DIASTOLIC BLOOD PRESSURE: 102 MMHG | WEIGHT: 266 LBS | HEIGHT: 68 IN | TEMPERATURE: 97.2 F | HEART RATE: 76 BPM | BODY MASS INDEX: 40.32 KG/M2

## 2018-10-12 DIAGNOSIS — F41.9 ANXIETY AND DEPRESSION: Primary | ICD-10-CM

## 2018-10-12 DIAGNOSIS — I10 ESSENTIAL HYPERTENSION: ICD-10-CM

## 2018-10-12 DIAGNOSIS — F32.A ANXIETY AND DEPRESSION: Primary | ICD-10-CM

## 2018-10-12 DIAGNOSIS — Z23 NEED FOR INFLUENZA VACCINATION: ICD-10-CM

## 2018-10-12 PROCEDURE — 90686 IIV4 VACC NO PRSV 0.5 ML IM: CPT

## 2018-10-12 PROCEDURE — 90471 IMMUNIZATION ADMIN: CPT

## 2018-10-12 PROCEDURE — 99214 OFFICE O/P EST MOD 30 MIN: CPT | Performed by: NURSE PRACTITIONER

## 2018-10-12 RX ORDER — FLUOXETINE 20 MG/1
TABLET, FILM COATED ORAL
Qty: 60 TABLET | Refills: 1 | Status: SHIPPED | OUTPATIENT
Start: 2018-10-12

## 2018-10-12 RX ORDER — LORAZEPAM 0.5 MG/1
0.5 TABLET ORAL 2 TIMES DAILY PRN
Qty: 30 TABLET | Refills: 0 | Status: SHIPPED | OUTPATIENT
Start: 2018-10-12

## 2018-10-12 NOTE — PROGRESS NOTES
FAMILY PRACTICE OFFICE VISIT       NAME: Sandy Winston  AGE: 26 y o  SEX: female       : 1995        MRN: [id number]    DATE: 10/12/2018    Assessment and Plan     Problem List Items Addressed This Visit     Essential hypertension      Other Visit Diagnoses     Anxiety and depression    -  Primary    Relevant Medications    FLUoxetine (PROzac) 20 MG tablet    LORazepam (ATIVAN) 0 5 mg tablet    Need for influenza vaccination        Relevant Orders    SYRINGE/SINGLE-DOSE VIAL: influenza vaccine, [id number], quadrivalent, 0 5 mL, preservative-free, for patients 3+ yr (FLUZONE) (Completed)          1  Anxiety and depression  Anxiety and depression recently exacerbated by relationship challenges  Will increase Fluoxetine to 30 mg for 1 week, then in increase to 40 mg daily  Continue Wellbutrin  Lorazepam 0 5 mg twice daily as needed  She is aware this medication has addicting potential and should only be used sparingly  She will continue counseling, and will follow up in 4-6 weeks  FLUoxetine (PROzac) 20 MG tablet    LORazepam (ATIVAN) 0 5 mg tablet   2  Essential hypertension  BP elevated in office today, but has been 120/70's with checks at work  She is taking OTC cold medications for resolving sinusitis  Suspect BP may be elevated due to cold medications  Reviewed safe use of OTC medications due to current medications  She will continue with current dose of labetalol, and will keep checking BP at work  Will continue to work on lifestyle modification  She will follow up in 4-6 weeks  3  Need for influenza vaccination  SYRINGE/SINGLE-DOSE VIAL: influenza vaccine, [id number], quadrivalent, 0 5 mL, preservative-free, for patients 3+ yr Ananya May           Chief Complaint     Chief Complaint   Patient presents with    Follow-up     Patient is here for a followup to discuss medications      Flu Vaccine       History of Present Illness     Dylan Greene is a 26year old female presenting today for for follow up visit  Hypertension: BP is running 120/70's at work  She checks several times per week  Notes "my weight is not good " Has been eating out a lot recently  Anxiety and depression: does not feel Wellbutrin has helped with mood  Anxiety is recently exacerbated by recent relationship challenges  Feels she is having anxiety attacks, which she has not had for a long time  Has taken Lorazepam as needed which has helped  Typically only using Lorazepam about once every 2 months  She has started counseling Yolanda Jacobs, in [facility]  Currently has resolving sinusitis, finished a course of antibiotics  Has been taking OTC cold medications  Review of Systems   Review of Systems   Constitutional: Negative for chills, diaphoresis, fatigue and fever  HENT: Positive for postnasal drip  Negative for congestion, ear pain, rhinorrhea, sinus pressure and sore throat  Respiratory: Positive for cough  Negative for chest tightness, shortness of breath and wheezing  Cardiovascular: Negative for chest pain, palpitations and leg swelling  Gastrointestinal: Negative for nausea and vomiting  Genitourinary: Negative  Neurological: Negative for dizziness and headaches  Psychiatric/Behavioral: Positive for dysphoric mood  The patient is nervous/anxious          Active Problem List     Patient Active Problem List   Diagnosis    Depression    Chronic fatigue    Depression with anxiety    Vitamin D deficiency    Scoliosis    Epigastric pain    Asthma    Hemangioma    Sleep apnea    Weight gain    Neck mass    Nerve sheath tumor    Schwannoma    Essential hypertension       Past Medical History:  Past Medical History:   Diagnosis Date    Hemangioma of lip     at [de-identified] old with chemo, surgery and tracheostomy    New onset of headaches     Sexually transmitted disease (STD)        Past Surgical History:  Past Surgical History:   Procedure Laterality Date    FACIAL/NECK BIOPSY Left 2/22/2018    Procedure: excision of left neck neurogenic tumor;  Surgeon: Fabian Prado MD;  [facility];  Service: Surgical Oncology    NECK SURGERY      SURGERY OF LIP      TONSILLECTOMY AND ADENOIDECTOMY      TRACHEOSTOMY         Family History:  Family History   Problem Relation Age of Onset    Hemochromatosis Father     Lung cancer Maternal Grandfather     Bone cancer Paternal Grandmother     Lung cancer Paternal Grandfather     No Known Problems Mother        Social History:  Social History     Social History    Marital status: Single     Spouse name: N/A    Number of children: 0    Years of education:  plus R[employer] program     Occupational History    Medical assistant      [employer]     Social History Main Topics    Smoking status: Light Tobacco Smoker     Types: Cigarettes     Last attempt to quit: 2017    Smokeless tobacco: Never Used    Alcohol use 2 4 oz/week     4 Glasses of wine per week      Comment: occasional    Drug use: No    Sexual activity: Yes     Birth control/ protection: OCP     Other Topics Concern    Not on file     Social History Narrative    Currently working, Medical Assistant       I have reviewed the patient's medical history in detail; there are no changes to the history as noted in the electronic medical record  Objective     Vitals:    10/12/18 0828   BP: (!) 128/102   Pulse: 76   Resp: 18   Temp: (!) 97 2 °F (36 2 °C)   TempSrc: Tympanic   Weight: 121 kg (266 lb)   Height: 5' 8" (1 727 m)     Wt Readings from Last 3 Encounters:   10/12/18 121 kg (266 lb)   09/12/18 121 kg (266 lb)   09/11/18 121 kg (266 lb)     Body mass index is 40 45 kg/m²  PHQ-9 Depression Screening    PHQ-9:    Frequency of the following problems over the past two weeks:            Physical Exam   Constitutional: She is oriented to person, place, and time  She appears well-developed and well-nourished  No distress  HENT:   Head: Normocephalic and atraumatic  Right Ear: Tympanic membrane and ear canal normal    Left Ear: Tympanic membrane and ear canal normal    Nose: Nose normal    Mouth/Throat: Oropharynx is clear and moist    Eyes: Conjunctivae are normal    Neck: Normal range of motion  Neck supple  Cardiovascular: Normal rate and regular rhythm  No murmur heard  Pulmonary/Chest: Effort normal and breath sounds normal    Musculoskeletal: Normal range of motion  She exhibits no edema  Lymphadenopathy:     She has no cervical adenopathy  Neurological: She is alert and oriented to person, place, and time  Psychiatric: She has a normal mood and affect  ALLERGIES:  No Known Allergies    Current Medications     Current Outpatient Prescriptions   Medication Sig Dispense Refill    buPROPion (WELLBUTRIN SR) 150 mg 12 hr tablet Take 1 tablet (150 mg total) by mouth 2 (two) times a day 60 tablet 2    Cholecalciferol (VITAMIN D3) 60464 units CAPS TAKE ONE CAPSULE EVERY SEVEN DAYS  3    FLUoxetine (PROzac) 20 MG tablet Take 1 5 tablets (30 mg) daily for 1 week, then take 2 tablets (40 mg) daily  60 tablet 1    labetalol (NORMODYNE) 200 mg tablet TAKE 1 TABLET BY MOUTH TWICE A DAY 30 tablet 2    loratadine (CLARITIN) 10 mg tablet Take 1 tablet by mouth daily as needed      LORazepam (ATIVAN) 0 5 mg tablet Take 1 tablet (0 5 mg total) by mouth 2 (two) times a day as needed for anxiety 30 tablet 0    valACYclovir (VALTREX) 500 mg tablet Take 1 tablet (500 mg total) by mouth daily for 90 days (Patient taking differently: Take 500 mg by mouth daily as needed  ) 90 tablet 3     No current facility-administered medications for this visit        Facility-Administered Medications Ordered in Other Visits   Medication Dose Route Frequency Provider Last Rate Last Dose    HYDROcodone-acetaminophen (NORCO) 5-325 mg per tablet 1 tablet  1 tablet Oral Q6H PRN Jasbir Burrows MD             Health Maintenance     Health Maintenance   Topic Date Due    Pneumococcal PPSV23 Medium Risk Adult (1 of 1 - PPSV23) 02/07/2015    PAP SMEAR  02/07/2017    DTaP,Tdap,and Td Vaccines (7 - Td) 02/15/2026    INFLUENZA VACCINE  Completed     Immunization History   Administered Date(s) Administered     Influenza (IM) Preservative Free 02/15/2016    DTaP 1995, 1996, 1996, 08/19/1997, 02/09/2000    DTaP 5 1996, 1996, 1996, 08/19/1997, 02/09/2000    HPV Quadrivalent 05/11/2007, 08/08/2007, 11/29/2007, 02/15/2016    Hep B, Adolescent or Pediatric 1996, 1996, 1996    Hep B, adult 1996, 1996, 1996    HiB 1996, 1996, 1996, 08/19/1997    Hib (PRP-OMP) 1996, 1996, 1996, 08/19/1997    IPV 1996, 1996, 08/19/1997, 02/09/2000    Influenza 11/23/2004, 11/29/2007, 11/01/2010    Influenza Quadrivalent Preservative Free 3 years and older IM 10/12/2017    Influenza TIV (IM) 10/28/2014, 10/15/2016    Influenza, injectable, quadrivalent, preservative free 0 5 mL 10/12/2018    MMR 08/19/1997, 02/09/2000    Meningococcal MCV4P 05/11/2007    Meningococcal, Unknown Serogroups 05/11/2007    Pneumococcal Conjugate 13-Valent 11/01/2000    Tdap 02/15/2016    Tuberculin Skin Test-PPD Intradermal 02/05/2016, 02/15/2016    Varicella 08/19/1997, 06/20/2008       MILTON Judge

## 2018-10-25 DIAGNOSIS — I10 ESSENTIAL HYPERTENSION: ICD-10-CM

## 2018-10-25 RX ORDER — LABETALOL 200 MG/1
TABLET, FILM COATED ORAL
Qty: 30 TABLET | Refills: 2 | Status: SHIPPED | OUTPATIENT
Start: 2018-10-25 | End: 2018-12-05 | Stop reason: SDUPTHER

## 2018-11-29 ENCOUNTER — TELEPHONE (OUTPATIENT)
Dept: SURGICAL ONCOLOGY | Facility: CLINIC | Age: 23
End: 2018-11-29

## 2018-11-29 NOTE — TELEPHONE ENCOUNTER
Spoke with patient  She will give us a call back to r/s her March appt   with Dr Alba Silva she needs to review her schedule first

## 2018-12-05 DIAGNOSIS — I10 ESSENTIAL HYPERTENSION: ICD-10-CM

## 2018-12-05 RX ORDER — LABETALOL 200 MG/1
TABLET, FILM COATED ORAL
Qty: 30 TABLET | Refills: 2 | Status: SHIPPED | OUTPATIENT
Start: 2018-12-05

## 2019-03-11 ENCOUNTER — HOSPITAL ENCOUNTER (OUTPATIENT)
Dept: RADIOLOGY | Facility: HOSPITAL | Age: 24
Discharge: HOME | End: 2019-03-11
Attending: SURGERY
Payer: COMMERCIAL

## 2019-03-11 DIAGNOSIS — D36.10 SCHWANNOMA: ICD-10-CM

## 2019-03-11 PROCEDURE — 70543 MRI ORBT/FAC/NCK W/O &W/DYE: CPT

## 2019-03-11 PROCEDURE — A9585 GADOBUTROL INJECTION: HCPCS | Performed by: SURGERY

## 2019-03-11 RX ADMIN — GADOBUTROL 12 ML: 604.72 INJECTION INTRAVENOUS at 08:48

## 2019-03-13 PROBLEM — Z86.018 HISTORY OF BENIGN SCHWANNOMA: Status: ACTIVE | Noted: 2018-03-06

## 2019-03-13 PROBLEM — D49.2 NERVE SHEATH TUMOR: Status: RESOLVED | Noted: 2018-01-30 | Resolved: 2019-03-13

## 2019-03-13 PROBLEM — R22.1 NECK MASS: Status: RESOLVED | Noted: 2018-01-24 | Resolved: 2019-03-13

## 2019-03-15 ENCOUNTER — OFFICE VISIT (OUTPATIENT)
Dept: SURGICAL ONCOLOGY | Facility: CLINIC | Age: 24
End: 2019-03-15
Payer: COMMERCIAL

## 2019-03-15 VITALS
HEART RATE: 73 BPM | HEIGHT: 68 IN | DIASTOLIC BLOOD PRESSURE: 84 MMHG | TEMPERATURE: 98.3 F | BODY MASS INDEX: 39.53 KG/M2 | SYSTOLIC BLOOD PRESSURE: 106 MMHG | RESPIRATION RATE: 14 BRPM

## 2019-03-15 DIAGNOSIS — Z86.018 HISTORY OF BENIGN SCHWANNOMA: Primary | ICD-10-CM

## 2019-03-15 PROCEDURE — 99213 OFFICE O/P EST LOW 20 MIN: CPT | Performed by: SURGERY

## 2019-03-15 RX ORDER — IBUPROFEN 400 MG/1
TABLET ORAL AS NEEDED
COMMUNITY
Start: 2019-02-16

## 2019-03-15 NOTE — LETTER
March 15, 2019     Jed Wright MD  [address] [facility] [address]    Patient: Steven Kruse   YOB: 1995   Date of Visit: 3/15/2019       Dear Dr Lv Dos Santos: Thank you for referring Jane Clayton to me for evaluation  Below are my notes for this consultation  If you have questions, please do not hesitate to call me  I look forward to following your patient along with you  Sincerely,        Adelita Clement MD        CC: MD Adelita Wang MD  3/15/2019  3:54 PM  Sign at close encounter               Surgical Oncology Follow Up       [address] [facility] [id number]  [address]   1995  [id number]  [address] [facility] [place] [place]  [address] [facility] [id number] [phone]    Diagnoses and all orders for this visit:    History of benign schwannoma  -     MRI soft tissue neck wo and w contrast; Future  -     BUN; Future  -     Creatinine, serum; Future    Other orders  -     ibuprofen (MOTRIN) 400 mg tablet        Chief Complaint   Patient presents with    Follow-up     Schwannoma       Return in about 1 year (around 3/15/2020) for Office Visit, Imaging - See orders  Oncology History    History of lip hemangioma resected at [facility] as a child, had a trach postop, that was removed few years later      Presented to the hospital with headaches, dizziness   Workup demonstrated a left neck mass   On MRI of the neck as well as CTA of the neck, mass  was contiguous with the exiting C6, C7 nerve roots i e  a schwannoma       2/22/18 underwent surgical excision (subtotal resection) of left neck mass with co-surgeons Dr Timmy Price and Dr Barbara Rodriguez   Pathology is schwannoma       8/27/18    IMPRESSION:     Bulk of bilobed nerve sheath tumor, emerging from the left C6-C7 foramen has been removed  Minor residual soft tissue within the foramen at this level may represent cicatrix  Surveillance imaging suggested  9/11/18 F/U with Dr Sue Salesness:              History of benign schwannoma    2/22/2018 Surgery       Case Report   Surgical Pathology Report                         Case: [id number]                                    Authorizing Provider: Toñito Tolliver MD           Collected:           02/22/2018 0903               Ordering Location:     [facility]      Received:            02/22/2018 1202                                      Hospital Operating Room                                                       Pathologist:           Vani Sinclair MD                                                                 Specimens:   A) - Lymph Node, Left Level 3                                                                        B) - Mass, left neck mass  short superior, long lateral                                     Final Diagnosis   A  Left level 3 lymph node (excision):     - Reactive lymph node      B  Left neck mass (excision):     - Findings compatible with schwannoma  Electronically signed by Vani Sinclair MD on 2/27/2018 at               2/2018 Initial Diagnosis     Schwannoma            Treatment history:  Excision of left neck mass/schwannoma, February 2018  Current treatment:  Observation  Disease status:  ANAMARIA      History of Present Illness:  Patient returns in follow-up of her schwannoma  She is doing well at this time with no complaints  She is having no neurologic issues with her face  Her headaches and nausea have markedly improved as well  She says her nausea only recurs when she works out  MRI from March 11, 2019 reveals that the vast majority of the tumor was removed  There is minimal residual enhancement from scar or small volume of tumor  There was a small lipoma  I personally reviewed the films       Review of Systems  Complete ROS Surg Onc:   Complete ROS Surg Onc: Constitutional: The patient denies new or recent history of general fatigue, no recent weight loss, no change in appetite  Eyes: No complaints of visual problems, no scleral icterus  ENT: no complaints of ear pain, no hoarseness, no difficulty swallowing,  no tinnitus and no new masses in head, oral cavity, or neck  Cardiovascular: No complaints of chest pain, no palpitations, no ankle edema  Respiratory: No complaints of shortness of breath, no cough  Gastrointestinal: No complaints of jaundice, no bloody stools, no pale stools  Genitourinary: No complaints of dysuria, no hematuria, no nocturia, no frequent urination, no urethral discharge  Musculoskeletal: No complaints of weakness, paralysis, joint stiffness or arthralgias  Integumentary: No complaints of rash, no new lesions  Neurological: No complaints of convulsions, no seizures, no dizziness  Hematologic/Lymphatic: No complaints of easy bruising  Endocrine:  No hot or cold intolerance  No polydipsia, polyphagia, or polyuria  Allergy/immunology:  No environmental allergies  No food allergies  Not immunocompromised  Skin:  No pallor or rash  No wound          Patient Active Problem List   Diagnosis    Depression    Chronic fatigue    Depression with anxiety    Vitamin D deficiency    Scoliosis    Epigastric pain    Asthma    Hemangioma    Sleep apnea    Weight gain    History of benign schwannoma    Essential hypertension     Past Medical History:   Diagnosis Date    Hemangioma of lip     at [de-identified] old with chemo, surgery and tracheostomy    New onset of headaches     Sexually transmitted disease (STD)      Past Surgical History:   Procedure Laterality Date    FACIAL/NECK BIOPSY Left 2/22/2018    Procedure: excision of left neck neurogenic tumor;  Surgeon: Jeanne Gamino MD;  [facility];  Service: Surgical Oncology    NECK SURGERY      SURGERY OF LIP      TONSILLECTOMY AND ADENOIDECTOMY      TRACHEOSTOMY       Family History   Problem Relation Age of Onset    Hemochromatosis Father     Lung cancer Maternal Grandfather     Bone cancer Paternal Grandmother     Lung cancer Paternal Grandfather     No Known Problems Mother      Social History     Socioeconomic History    Marital status: Single     Spouse name: Not on file    Number of children: 0    Years of education: HS plus RMA program    Highest education level: Not on file   Occupational History    Occupation: Medical assistant     Comment: [employer]   Social Needs    Financial resource strain: Not on file    Food insecurity:     Worry: Not on file     Inability: Not on file    Transportation needs:     Medical: Not on file     Non-medical: Not on file   Tobacco Use    Smoking status: Light Tobacco Smoker     Types: Cigarettes     Last attempt to quit:      Years since quittin 2    Smokeless tobacco: Never Used   Substance and Sexual Activity    Alcohol use:  Yes     Alcohol/week: 2 4 oz     Types: 4 Glasses of wine per week     Comment: occasional    Drug use: No    Sexual activity: Yes     Birth control/protection: OCP   Lifestyle    Physical activity:     Days per week: Not on file     Minutes per session: Not on file    Stress: Not on file   Relationships    Social connections:     Talks on phone: Not on file     Gets together: Not on file     Attends Evangelical service: Not on file     Active member of club or organization: Not on file     Attends meetings of clubs or organizations: Not on file     Relationship status: Not on file    Intimate partner violence:     Fear of current or ex partner: Not on file     Emotionally abused: Not on file     Physically abused: Not on file     Forced sexual activity: Not on file   Other Topics Concern    Not on file   Social History Narrative    Currently working, Medical Assistant       Current Outpatient Medications:     Cholecalciferol (VITAMIN D3) 77263 units CAPS, TAKE ONE CAPSULE EVERY SEVEN DAYS, Disp: , Rfl: 3    FLUoxetine (PROzac) 20 MG tablet, Take 1 5 tablets (30 mg) daily for 1 week, then take 2 tablets (40 mg) daily  (Patient taking differently: 60 mg daily Take 1 5 tablets (30 mg) daily for 1 week, then take 2 tablets (40 mg) daily ), Disp: 60 tablet, Rfl: 1    ibuprofen (MOTRIN) 400 mg tablet, , Disp: , Rfl:     labetalol (NORMODYNE) 200 mg tablet, TAKE 1 TABLET BY MOUTH TWICE A DAY, Disp: 30 tablet, Rfl: 2    loratadine (CLARITIN) 10 mg tablet, Take 1 tablet by mouth daily as needed, Disp: , Rfl:     LORazepam (ATIVAN) 0 5 mg tablet, Take 1 tablet (0 5 mg total) by mouth 2 (two) times a day as needed for anxiety, Disp: 30 tablet, Rfl: 0    valACYclovir (VALTREX) 500 mg tablet, Take 1 tablet (500 mg total) by mouth daily for 90 days (Patient taking differently: Take 500 mg by mouth daily as needed  ), Disp: 90 tablet, Rfl: 3  No current facility-administered medications for this visit  Facility-Administered Medications Ordered in Other Visits:     HYDROcodone-acetaminophen (NORCO) 5-325 mg per tablet 1 tablet, 1 tablet, Oral, Q6H PRN, Grady Frye MD  Allergies   Allergen Reactions    Amoxicillin Rash     Patient stated she, "broke out all over her body "     Vitals:    03/15/19 1529   BP: 106/84   Pulse: 73   Resp: 14   Temp: 98 3 °F (36 8 °C)       Physical Exam  Constitutional: General appearance: The Patient is well-developed and well-nourished who appears the stated age in no acute distress  Patient is pleasant and talkative  HEENT:  Normocephalic  Sclerae are anicteric  Mucous membranes are moist  Neck is supple without adenopathy  No JVD  There is a hypertrophic scar     Chest: The lungs are clear to auscultation  Cardiac: Heart is regular rate  Abdomen: Abdomen is soft, non-tender, non-distended and without masses  Extremities: There is no clubbing or cyanosis  There is no edema  Symmetric    Neuro: Grossly nonfocal  Gait is normal      Lymphatic: No evidence of cervical adenopathy bilaterally  No evidence of axillary adenopathy bilaterally  Skin: Warm, anicteric  Psych:  Patient is pleasant and talkative  Breasts:        Pathology:  [unfilled]    Labs:      Imaging  Mri Soft Tissue Neck Wo And W Contrast    Result Date: 3/12/2019  Narrative: MRI OF THE SOFT TISSUES OF THE NECK - WITH AND WITHOUT CONTRAST INDICATION: D36 10: Benign neoplasm of peripheral nerves and autonomic nervous system, unspecified COMPARISON:  None  TECHNIQUE:  Sagittal T1, axial DWI and T2  Axial inversion recovery or fat suppressed T2  Axial T1 precontrast   Axial and coronal T1 postcontrast with fat suppression  IV Contrast:  12 mL of gadobutrol injection (MULTI-DOSE) IMAGE QUALITY:  Diagnostic  FINDINGS: VISUALIZED BRAIN PARENCHYMA:  Normal  VISUALIZED ORBITS AND PARANASAL SINUSES:  Normal  NASAL CAVITY AND NASOPHARYNX:  Normal  SUPRAHYOID NECK:  Normal oral cavity, tongue base, tonsillar fossa and epiglottis  There is extensive fat identified in the suprahyoid neck posterior to the left sternocleidomastoid muscle asymmetrically increased on the left compared to the right with prominent fat in the parapharyngeal fat space bilaterally left greater than right likely lipoma and unchanged from prior study  Homogeneous fat suppression is noted without abnormal enhancement    INFRAHYOID NECK:  Aryepiglottic folds, laryngeal tissues, and piriform sinuses are normal   The bulk of the paraspinal nerve sheath tumor described on prior studies at the C6-7 level has been removed  Small amount of residual soft tissue material in the  foramen on the left may represent scar tissue or potentially some residual tumor  Continued surveillance imaging recommended  THYROID GLAND:   Normal  PAROTID AND SUBMANDIBULAR GLANDS:  Some fatty replacement of the left parotid gland noted  LYMPH NODES:  No pathologic or enlarged adenopathy   VASCULAR STRUCTURES:  Grossly normal flow voids of the cervical vasculature  THORACIC INLET: Lung apices and upper mediastinum are grossly unremarkable  CERVICAL SPINE:  Normal alignment of the cervical spine  Normal marrow signal   No gross evidence of degenerative disease  Cervical cord is grossly normal in signal      Impression: Stable MRI of the neck  The vast majority of the bilobed nerve sheath tumor described on the prior study which emerges from the left C6-7 foramen has been removed  Minimal residual enhancement likely scar although residual small volume of tumor not excluded  Continued imaging follow-up recommended  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  Workstation performed: [id number]     I reviewed the above laboratory and imaging data  Discussion/Summary: 52-year-old female status post excision of a schwannoma  This was completely excised except for at the area of nerve root  She is feeling well  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

## 2019-03-15 NOTE — PROGRESS NOTES
Surgical Oncology Follow Up       [facility]ER [facility] [address] [id number] [id number] [place]  1995  [id number]  [address] [facility] [address] [id number] [phone]    Diagnoses and all orders for this visit:    History of benign schwannoma  -     MRI soft tissue neck wo and w contrast; Future  -     BUN; Future  -     Creatinine, serum; Future    Other orders  -     ibuprofen (MOTRIN) 400 mg tablet        Chief Complaint   Patient presents with    Follow-up     Schwannoma       Return in about 1 year (around 3/15/2020) for Office Visit, Imaging - See orders  Oncology History    History of lip hemangioma resected at [facility] as a child, had a trach postop, that was removed few years later      Presented to the hospital with headaches, dizziness   Workup demonstrated a left neck mass   On MRI of the neck as well as CTA of the neck, mass  was contiguous with the exiting C6, C7 nerve roots i e  a schwannoma  2/22/18 underwent surgical excision (subtotal resection) of left neck mass with co-surgeons Dr Volodymyr aJcinto and Dr Radames Hills   Pathology is schwannoma       8/27/18    IMPRESSION:     Bulk of bilobed nerve sheath tumor, emerging from the left C6-C7 foramen has been removed  Minor residual soft tissue within the foramen at this level may represent cicatrix  Surveillance imaging suggested      9/11/18 F/U with Dr Volodymyr Jacinto:              History of benign schwannoma    2/22/2018 Surgery       Case Report   Surgical Pathology Report                         Case: [id number]                                    Authorizing Provider: Nancy Jimenez MD           Collected:           02/22/2018 0903               Ordering Location:     [facility]      Received:            02/22/2018 9511                                      Hospital Operating Room                                                       Pathologist:           Kassandra Hanna MD                                                                 Specimens:   A) - Lymph Node, Left Level 3                                                                        B) - Mass, left neck mass  short superior, long lateral                                     Final Diagnosis   A  Left level 3 lymph node (excision):     - Reactive lymph node      B  Left neck mass (excision):     - Findings compatible with schwannoma  Electronically signed by Kassandra Hanna MD on 2/27/2018 at               2/2018 Initial Diagnosis     Schwannoma            Treatment history:  Excision of left neck mass/schwannoma, February 2018  Current treatment:  Observation  Disease status:  ANAMARIA      History of Present Illness:  Patient returns in follow-up of her schwannoma  She is doing well at this time with no complaints  She is having no neurologic issues with her face  Her headaches and nausea have markedly improved as well  She says her nausea only recurs when she works out  MRI from March 11, 2019 reveals that the vast majority of the tumor was removed  There is minimal residual enhancement from scar or small volume of tumor  There was a small lipoma  I personally reviewed the films  Review of Systems  Complete ROS Surg Onc:   Complete ROS Surg Onc:   Constitutional: The patient denies new or recent history of general fatigue, no recent weight loss, no change in appetite  Eyes: No complaints of visual problems, no scleral icterus  ENT: no complaints of ear pain, no hoarseness, no difficulty swallowing,  no tinnitus and no new masses in head, oral cavity, or neck  Cardiovascular: No complaints of chest pain, no palpitations, no ankle edema  Respiratory: No complaints of shortness of breath, no cough  Gastrointestinal: No complaints of jaundice, no bloody stools, no pale stools     Genitourinary: No complaints of dysuria, no hematuria, no nocturia, no frequent urination, no urethral discharge  Musculoskeletal: No complaints of weakness, paralysis, joint stiffness or arthralgias  Integumentary: No complaints of rash, no new lesions  Neurological: No complaints of convulsions, no seizures, no dizziness  Hematologic/Lymphatic: No complaints of easy bruising  Endocrine:  No hot or cold intolerance  No polydipsia, polyphagia, or polyuria  Allergy/immunology:  No environmental allergies  No food allergies  Not immunocompromised  Skin:  No pallor or rash  No wound          Patient Active Problem List   Diagnosis    Depression    Chronic fatigue    Depression with anxiety    Vitamin D deficiency    Scoliosis    Epigastric pain    Asthma    Hemangioma    Sleep apnea    Weight gain    History of benign schwannoma    Essential hypertension     Past Medical History:   Diagnosis Date    Hemangioma of lip     at [de-identified] old with chemo, surgery and tracheostomy    New onset of headaches     Sexually transmitted disease (STD)      Past Surgical History:   Procedure Laterality Date    FACIAL/NECK BIOPSY Left 2/22/2018    Procedure: excision of left neck neurogenic tumor;  Surgeon: Annette Garcia MD;  [facility];  Service: Surgical Oncology    NECK SURGERY      SURGERY OF LIP      TONSILLECTOMY AND ADENOIDECTOMY      TRACHEOSTOMY       Family History   Problem Relation Age of Onset    Hemochromatosis Father     Lung cancer Maternal Grandfather     Bone cancer Paternal Grandmother     Lung cancer Paternal Grandfather     No Known Problems Mother      Social History     Socioeconomic History    Marital status: Single     Spouse name: Not on file    Number of children: 0    Years of education: HS plus RMA program    Highest education level: Not on file   Occupational History    Occupation: Medical assistant     Comment: [address] [employer] Financial resource strain: Not on file    Food insecurity:     Worry: Not on file     Inability: Not on file    Transportation needs:     Medical: Not on file     Non-medical: Not on file   Tobacco Use    Smoking status: Light Tobacco Smoker     Types: Cigarettes     Last attempt to quit: 2017     Years since quittin 2    Smokeless tobacco: Never Used   Substance and Sexual Activity    Alcohol use: Yes     Alcohol/week: 2 4 oz     Types: 4 Glasses of wine per week     Comment: occasional    Drug use: No    Sexual activity: Yes     Birth control/protection: OCP   Lifestyle    Physical activity:     Days per week: Not on file     Minutes per session: Not on file    Stress: Not on file   Relationships    Social connections:     Talks on phone: Not on file     Gets together: Not on file     Attends Rastafari service: Not on file     Active member of club or organization: Not on file     Attends meetings of clubs or organizations: Not on file     Relationship status: Not on file    Intimate partner violence:     Fear of current or ex partner: Not on file     Emotionally abused: Not on file     Physically abused: Not on file     Forced sexual activity: Not on file   Other Topics Concern    Not on file   Social History Narrative    Currently working, Medical Assistant       Current Outpatient Medications:     Cholecalciferol (VITAMIN D3) 82283 units CAPS, TAKE ONE CAPSULE EVERY SEVEN DAYS, Disp: , Rfl: 3    FLUoxetine (PROzac) 20 MG tablet, Take 1 5 tablets (30 mg) daily for 1 week, then take 2 tablets (40 mg) daily   (Patient taking differently: 60 mg daily Take 1 5 tablets (30 mg) daily for 1 week, then take 2 tablets (40 mg) daily ), Disp: 60 tablet, Rfl: 1    ibuprofen (MOTRIN) 400 mg tablet, , Disp: , Rfl:     labetalol (NORMODYNE) 200 mg tablet, TAKE 1 TABLET BY MOUTH TWICE A DAY, Disp: 30 tablet, Rfl: 2    loratadine (CLARITIN) 10 mg tablet, Take 1 tablet by mouth daily as needed, Disp: , Rfl:     LORazepam (ATIVAN) 0 5 mg tablet, Take 1 tablet (0 5 mg total) by mouth 2 (two) times a day as needed for anxiety, Disp: 30 tablet, Rfl: 0    valACYclovir (VALTREX) 500 mg tablet, Take 1 tablet (500 mg total) by mouth daily for 90 days (Patient taking differently: Take 500 mg by mouth daily as needed  ), Disp: 90 tablet, Rfl: 3  No current facility-administered medications for this visit  Facility-Administered Medications Ordered in Other Visits:     HYDROcodone-acetaminophen (NORCO) 5-325 mg per tablet 1 tablet, 1 tablet, Oral, Q6H PRN, Annette Garcia MD  Allergies   Allergen Reactions    Amoxicillin Rash     Patient stated she, "broke out all over her body "     Vitals:    03/15/19 1529   BP: 106/84   Pulse: 73   Resp: 14   Temp: 98 3 °F (36 8 °C)       Physical Exam  Constitutional: General appearance: The Patient is well-developed and well-nourished who appears the stated age in no acute distress  Patient is pleasant and talkative  HEENT:  Normocephalic  Sclerae are anicteric  Mucous membranes are moist  Neck is supple without adenopathy  No JVD  There is a hypertrophic scar     Chest: The lungs are clear to auscultation  Cardiac: Heart is regular rate  Abdomen: Abdomen is soft, non-tender, non-distended and without masses  Extremities: There is no clubbing or cyanosis  There is no edema  Symmetric  Neuro: Grossly nonfocal  Gait is normal      Lymphatic: No evidence of cervical adenopathy bilaterally  No evidence of axillary adenopathy bilaterally  Skin: Warm, anicteric  Psych:  Patient is pleasant and talkative  Breasts:        Pathology:  [unfilled]    Labs:      Imaging  Mri Soft Tissue Neck Wo And W Contrast    Result Date: 3/12/2019  Narrative: MRI OF THE SOFT TISSUES OF THE NECK - WITH AND WITHOUT CONTRAST INDICATION: D36 10: Benign neoplasm of peripheral nerves and autonomic nervous system, unspecified COMPARISON:  None  TECHNIQUE:  Sagittal T1, axial DWI and T2    Axial inversion recovery or fat suppressed T2  Axial T1 precontrast   Axial and coronal T1 postcontrast with fat suppression  IV Contrast:  12 mL of gadobutrol injection (MULTI-DOSE) IMAGE QUALITY:  Diagnostic  FINDINGS: VISUALIZED BRAIN PARENCHYMA:  Normal  VISUALIZED ORBITS AND PARANASAL SINUSES:  Normal  NASAL CAVITY AND NASOPHARYNX:  Normal  SUPRAHYOID NECK:  Normal oral cavity, tongue base, tonsillar fossa and epiglottis  There is extensive fat identified in the suprahyoid neck posterior to the left sternocleidomastoid muscle asymmetrically increased on the left compared to the right with prominent fat in the parapharyngeal fat space bilaterally left greater than right likely lipoma and unchanged from prior study  Homogeneous fat suppression is noted without abnormal enhancement    INFRAHYOID NECK:  Aryepiglottic folds, laryngeal tissues, and piriform sinuses are normal   The bulk of the paraspinal nerve sheath tumor described on prior studies at the C6-7 level has been removed  Small amount of residual soft tissue material in the  foramen on the left may represent scar tissue or potentially some residual tumor  Continued surveillance imaging recommended  THYROID GLAND:   Normal  PAROTID AND SUBMANDIBULAR GLANDS:  Some fatty replacement of the left parotid gland noted  LYMPH NODES:  No pathologic or enlarged adenopathy  VASCULAR STRUCTURES:  Grossly normal flow voids of the cervical vasculature  THORACIC INLET: Lung apices and upper mediastinum are grossly unremarkable  CERVICAL SPINE:  Normal alignment of the cervical spine  Normal marrow signal   No gross evidence of degenerative disease  Cervical cord is grossly normal in signal      Impression: Stable MRI of the neck  The vast majority of the bilobed nerve sheath tumor described on the prior study which emerges from the left C6-7 foramen has been removed  Minimal residual enhancement likely scar although residual small volume of tumor not excluded  Continued imaging follow-up recommended  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  Workstation performed: [id number]     I reviewed the above laboratory and imaging data  Discussion/Summary: 25-year-old female status post excision of a schwannoma  This was completely excised except for at the area of nerve root  She is feeling well  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

## 2019-03-20 ENCOUNTER — OFFICE VISIT (OUTPATIENT)
Dept: NEUROSURGERY | Facility: CLINIC | Age: 24
End: 2019-03-20
Payer: COMMERCIAL

## 2019-03-20 VITALS
SYSTOLIC BLOOD PRESSURE: 138 MMHG | TEMPERATURE: 98 F | HEIGHT: 68 IN | DIASTOLIC BLOOD PRESSURE: 90 MMHG | BODY MASS INDEX: 39.53 KG/M2 | HEART RATE: 80 BPM | RESPIRATION RATE: 18 BRPM

## 2019-03-20 DIAGNOSIS — Z86.018 HISTORY OF BENIGN SCHWANNOMA: Primary | ICD-10-CM

## 2019-03-20 PROCEDURE — 99213 OFFICE O/P EST LOW 20 MIN: CPT | Performed by: NEUROLOGICAL SURGERY

## 2019-03-20 NOTE — PROGRESS NOTES
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, with a tracheostomy placed from that time and she was 3 in half  Surgeon with Dr Karen Morrissey  He saw her in follow-up yesterday  On follow-up today, she states she has no headaches that she did prior to surgery  She had some postop visual difficulty and had a Ulysses's syndrome postop but that has resolved  Her pupils are equal reactive presently  She had some numbness adjacent to the incision  Which is  Worse with exercise  Her incision is relatively well healed, with some keloid but no signs of infection dehiscence etc     MRI scan of the neck,   X2 since surgery, shows no definitive residual   There is some cicatrix in the foramina of C6-7 which very likely could be residual tumor  We talked about options for management  With her young age, unless this shows signs of recurrence, we can hold off on any adjuvant radiation for now  Should show signs of recurrence, SRS or SRT would likely achieve local control with minimal side effect  Dr Karen Morrissey ordered a follow-up MRI scan at of the neck be done in 12 months  I feel is entirely appropriate and agree  we discussed the utility of follow-up with both Dr Karen Morrissey & myself  I am more than happy to see the patient in follow-up as well, but do not want to [place] her with dual visits etc   As such,  She will continue to follow up with Dr Karen Morrissey,  Will contact us should there be any   Other need for radiation, surgery etc   Otherwise will see her as needed      Metrics:  EQ5D5L 1 000, VA S 80, KPS , ECOG 0  CHIEF COMPLAINT    Chief Complaint   Patient presents with    Follow-up     6 month follow up with new MRI soft tissue neck       HISTORY    History of Present Illness     22y o  year old female     HPI    See Discussion    REVIEW OF SYSTEMS    Review of Systems   Constitutional: Positive for activity change (increased, initiated exercising) and fatigue (chronic, severe)  HENT: Negative  Eyes: Negative  Respiratory: Negative  Cardiovascular: Negative  Gastrointestinal: Positive for constipation (occasional) and nausea (occasional)  Endocrine: Negative  Genitourinary: Negative  Musculoskeletal: Negative  Skin: Negative  Allergic/Immunologic: Positive for environmental allergies (seasonal)  Neurological: Positive for dizziness (with nausea), numbness (neck N/T with exercise / activity) and headaches (improved with menses only now)  Hematological: Negative  Psychiatric/Behavioral: Dysphoric mood: controlled by meds  The patient is nervous/anxious (controlled with meds)  Meds/Allergies     Current Outpatient Medications   Medication Sig Dispense Refill    Cholecalciferol (VITAMIN D3) 11838 units CAPS TAKE ONE CAPSULE EVERY SEVEN DAYS  3    FLUoxetine (PROzac) 20 MG tablet Take 1 5 tablets (30 mg) daily for 1 week, then take 2 tablets (40 mg) daily   (Patient taking differently: 60 mg daily Take 1 5 tablets (30 mg) daily for 1 week, then take 2 tablets (40 mg) daily ) 60 tablet 1    ibuprofen (MOTRIN) 400 mg tablet       labetalol (NORMODYNE) 200 mg tablet TAKE 1 TABLET BY MOUTH TWICE A DAY 30 tablet 2    loratadine (CLARITIN) 10 mg tablet Take 1 tablet by mouth daily as needed      LORazepam (ATIVAN) 0 5 mg tablet Take 1 tablet (0 5 mg total) by mouth 2 (two) times a day as needed for anxiety 30 tablet 0    valACYclovir (VALTREX) 500 mg tablet Take 1 tablet (500 mg total) by mouth daily for 90 days (Patient taking differently: Take 500 mg by mouth daily as needed ) 90 tablet 3     No current facility-administered medications for this visit  Facility-Administered Medications Ordered in Other Visits   Medication Dose Route Frequency Provider Last Rate Last Dose    HYDROcodone-acetaminophen (NORCO) 5-325 mg per tablet 1 tablet  1 tablet Oral Q6H PRN Levy Deluca MD           Allergies   Allergen Reactions    Amoxicillin Rash     Patient stated she, "broke out all over her body "       PAST HISTORY    Past Medical History:   Diagnosis Date    Hemangioma of lip     at [de-identified] old with chemo, surgery and tracheostomy    New onset of headaches     Sexually transmitted disease (STD)        Past Surgical History:   Procedure Laterality Date    FACIAL/NECK BIOPSY Left 2018    Procedure: excision of left neck neurogenic tumor;  Surgeon: Levy Deluca MD;  [facility];  Service: Surgical Oncology    NECK SURGERY      SURGERY OF LIP      TONSILLECTOMY AND ADENOIDECTOMY      TRACHEOSTOMY         Social History     Tobacco Use    Smoking status: Light Tobacco Smoker     Types: Cigarettes     Last attempt to quit: 2017     Years since quittin 2    Smokeless tobacco: Never Used   Substance Use Topics    Alcohol use: Yes     Alcohol/week: 2 4 oz     Types: 4 Glasses of wine per week     Comment: occasional    Drug use: No       Family History   Problem Relation Age of Onset    Hemochromatosis Father     Lung cancer Maternal Grandfather     Bone cancer Paternal Grandmother     Lung cancer Paternal Grandfather     No Known Problems Mother          Above history personally reviewed  EXAM    Vitals:Last menstrual period 2019 ,There is no height or weight on file to calculate BMI  Physical Exam   Constitutional: She is oriented to person, place, and time  She appears well-developed and well-nourished  HENT:   Head: Normocephalic  Eyes: No scleral icterus  Neck: Neck supple  Cardiovascular: Normal rate     Pulmonary/Chest: Effort normal    Abdominal: Soft  Neurological: She is alert and oriented to person, place, and time  GCS eye subscore is 4  GCS verbal subscore is 5  GCS motor subscore is 6  Skin: Skin is warm and dry  Psychiatric: She has a normal mood and affect  Her speech is normal and behavior is normal    Vitals reviewed  Neurologic Exam     Mental Status   Oriented to person, place, and time  Attention: normal    Speech: speech is normal   Level of consciousness: alert    Cranial Nerves     CN VII   Facial expression full, symmetric  Motor Exam   Muscle bulk: normal  Overall muscle tone: normal    Strength   Right biceps: 5/5  Left biceps: 5/5  Right triceps: 5/5  Left triceps: 5/5  Right wrist extension: 5/5  Left wrist extension: 5/5  Moves all extremities, grossly normal     Gait, Coordination, and Reflexes     Tremor   Resting tremor: absent  Intention tremor: absent  Action tremor: absent  No aids  MEDICAL DECISION MAKING    Imaging Studies:     Mri Soft Tissue Neck Wo And W Contrast    Result Date: 3/12/2019  Narrative: MRI OF THE SOFT TISSUES OF THE NECK - WITH AND WITHOUT CONTRAST INDICATION: D36 10: Benign neoplasm of peripheral nerves and autonomic nervous system, unspecified COMPARISON:  None  TECHNIQUE:  Sagittal T1, axial DWI and T2  Axial inversion recovery or fat suppressed T2  Axial T1 precontrast   Axial and coronal T1 postcontrast with fat suppression  IV Contrast:  12 mL of gadobutrol injection (MULTI-DOSE) IMAGE QUALITY:  Diagnostic  FINDINGS: VISUALIZED BRAIN PARENCHYMA:  Normal  VISUALIZED ORBITS AND PARANASAL SINUSES:  Normal  NASAL CAVITY AND NASOPHARYNX:  Normal  SUPRAHYOID NECK:  Normal oral cavity, tongue base, tonsillar fossa and epiglottis    There is extensive fat identified in the suprahyoid neck posterior to the left sternocleidomastoid muscle asymmetrically increased on the left compared to the right with prominent fat in the parapharyngeal fat space bilaterally left greater than right likely lipoma and unchanged from prior study  Homogeneous fat suppression is noted without abnormal enhancement    INFRAHYOID NECK:  Aryepiglottic folds, laryngeal tissues, and piriform sinuses are normal   The bulk of the paraspinal nerve sheath tumor described on prior studies at the C6-7 level has been removed  Small amount of residual soft tissue material in the  foramen on the left may represent scar tissue or potentially some residual tumor  Continued surveillance imaging recommended  THYROID GLAND:   Normal  PAROTID AND SUBMANDIBULAR GLANDS:  Some fatty replacement of the left parotid gland noted  LYMPH NODES:  No pathologic or enlarged adenopathy  VASCULAR STRUCTURES:  Grossly normal flow voids of the cervical vasculature  THORACIC INLET: Lung apices and upper mediastinum are grossly unremarkable  CERVICAL SPINE:  Normal alignment of the cervical spine  Normal marrow signal   No gross evidence of degenerative disease  Cervical cord is grossly normal in signal      Impression: Stable MRI of the neck  The vast majority of the bilobed nerve sheath tumor described on the prior study which emerges from the left C6-7 foramen has been removed  Minimal residual enhancement likely scar although residual small volume of tumor not excluded  Continued imaging follow-up recommended  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

## 2019-03-20 NOTE — PROGRESS NOTES
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, with a tracheostomy placed from that time and she was 4 in half  Surgeon with Dr Antonietta Parker  He saw her in follow-up yesterday  On follow-up today, she states she has no headaches that she did prior to surgery  She had some postop visual difficulty and had a Ulysses's syndrome postop but that has resolved  Her pupils are equal reactive presently  She had some numbness adjacent to the incision  Which is  Worse with exercise  Her incision is relatively well healed, with some keloid but no signs of infection dehiscence etc     MRI scan of the neck,   X2 since surgery, shows no definitive residual   There is some cicatrix in the foramina of C6-7 which very likely could be residual tumor  We talked about options for management  With her young age, unless this shows signs of recurrence, we can hold off on any adjuvant radiation for now  Should show signs of recurrence, SRS or SRT would likely achieve local control with minimal side effect  Dr Antonietta Parker ordered a follow-up MRI scan at of the neck be done in 12 months  I feel is entirely appropriate and agree  we discussed the utility of follow-up with both Dr Antonietta Parker & myself  I am more than happy to see the patient in follow-up as well, but do not want to [place] her with dual visits etc   As such,  She will continue to follow up with Dr Antonietta Parker,  Will contact us should there be any   Other need for radiation, surgery etc   Otherwise will see her as needed      Metrics:  EQ5D5L 1 000, VA S 80, KPS , ECOG 0  CHIEF COMPLAINT    Chief Complaint   Patient presents with    Follow-up     6 month follow up with new MRI soft tissue neck       HISTORY    History of Present Illness     22y o  year old female     HPI    See Discussion    REVIEW OF SYSTEMS    Review of Systems   Constitutional: Positive for activity change (increased, initiated exercising) and fatigue (chronic, severe)  HENT: Negative  Eyes: Negative  Respiratory: Negative  Cardiovascular: Negative  Gastrointestinal: Positive for constipation (occasional) and nausea (occasional)  Endocrine: Negative  Genitourinary: Negative  Musculoskeletal: Negative  Skin: Negative  Allergic/Immunologic: Positive for environmental allergies (seasonal)  Neurological: Positive for dizziness (with nausea), numbness (neck N/T with exercise / activity) and headaches (improved with menses only now)  Hematological: Negative  Psychiatric/Behavioral: Dysphoric mood: controlled by meds  The patient is nervous/anxious (controlled with meds)  Meds/Allergies     Current Outpatient Medications   Medication Sig Dispense Refill    Cholecalciferol (VITAMIN D3) 80373 units CAPS TAKE ONE CAPSULE EVERY SEVEN DAYS  3    FLUoxetine (PROzac) 20 MG tablet Take 1 5 tablets (30 mg) daily for 1 week, then take 2 tablets (40 mg) daily   (Patient taking differently: 60 mg daily Take 1 5 tablets (30 mg) daily for 1 week, then take 2 tablets (40 mg) daily ) 60 tablet 1    ibuprofen (MOTRIN) 400 mg tablet       labetalol (NORMODYNE) 200 mg tablet TAKE 1 TABLET BY MOUTH TWICE A DAY 30 tablet 2    loratadine (CLARITIN) 10 mg tablet Take 1 tablet by mouth daily as needed      LORazepam (ATIVAN) 0 5 mg tablet Take 1 tablet (0 5 mg total) by mouth 2 (two) times a day as needed for anxiety 30 tablet 0    valACYclovir (VALTREX) 500 mg tablet Take 1 tablet (500 mg total) by mouth daily for 90 days (Patient taking differently: Take 500 mg by mouth daily as needed ) 90 tablet 3     No current facility-administered medications for this visit  Facility-Administered Medications Ordered in Other Visits   Medication Dose Route Frequency Provider Last Rate Last Dose    HYDROcodone-acetaminophen (NORCO) 5-325 mg per tablet 1 tablet  1 tablet Oral Q6H PRN Harris Jo MD           Allergies   Allergen Reactions    Amoxicillin Rash     Patient stated she, "broke out all over her body "       PAST HISTORY    Past Medical History:   Diagnosis Date    Hemangioma of lip     at [de-identified] old with chemo, surgery and tracheostomy    New onset of headaches     Sexually transmitted disease (STD)        Past Surgical History:   Procedure Laterality Date    FACIAL/NECK BIOPSY Left 2018    Procedure: excision of left neck neurogenic tumor;  Surgeon: Harris Jo MD;  Location: [facility];  Service: Surgical Oncology    NECK SURGERY      SURGERY OF LIP      TONSILLECTOMY AND ADENOIDECTOMY      TRACHEOSTOMY         Social History     Tobacco Use    Smoking status: Light Tobacco Smoker     Types: Cigarettes     Last attempt to quit:      Years since quittin 2    Smokeless tobacco: Never Used   Substance Use Topics    Alcohol use: Yes     Alcohol/week: 2 4 oz     Types: 4 Glasses of wine per week     Comment: occasional    Drug use: No       Family History   Problem Relation Age of Onset    Hemochromatosis Father     Lung cancer Maternal Grandfather     Bone cancer Paternal Grandmother     Lung cancer Paternal Grandfather     No Known Problems Mother          Above history personally reviewed  EXAM    Vitals:Last menstrual period 2019 ,There is no height or weight on file to calculate BMI  Physical Exam   Constitutional: She is oriented to person, place, and time  She appears well-developed and well-nourished  HENT:   Head: Normocephalic  Eyes: No scleral icterus  Neck: Neck supple  Cardiovascular: Normal rate     Pulmonary/Chest: Effort normal    Abdominal: Soft  Neurological: She is alert and oriented to person, place, and time  GCS eye subscore is 4  GCS verbal subscore is 5  GCS motor subscore is 6  Skin: Skin is warm and dry  Psychiatric: She has a normal mood and affect  Her speech is normal and behavior is normal    Vitals reviewed  Neurologic Exam     Mental Status   Oriented to person, place, and time  Attention: normal    Speech: speech is normal   Level of consciousness: alert    Cranial Nerves     CN VII   Facial expression full, symmetric  Motor Exam   Muscle bulk: normal  Overall muscle tone: normal    Strength   Right biceps: 5/5  Left biceps: 5/5  Right triceps: 5/5  Left triceps: 5/5  Right wrist extension: 5/5  Left wrist extension: 5/5  Moves all extremities, grossly normal     Gait, Coordination, and Reflexes     Tremor   Resting tremor: absent  Intention tremor: absent  Action tremor: absent  No aids  MEDICAL DECISION MAKING    Imaging Studies:     Mri Soft Tissue Neck Wo And W Contrast    Result Date: 3/12/2019  Narrative: MRI OF THE SOFT TISSUES OF THE NECK - WITH AND WITHOUT CONTRAST INDICATION: D36 10: Benign neoplasm of peripheral nerves and autonomic nervous system, unspecified COMPARISON:  None  TECHNIQUE:  Sagittal T1, axial DWI and T2  Axial inversion recovery or fat suppressed T2  Axial T1 precontrast   Axial and coronal T1 postcontrast with fat suppression  IV Contrast:  12 mL of gadobutrol injection (MULTI-DOSE) IMAGE QUALITY:  Diagnostic  FINDINGS: VISUALIZED BRAIN PARENCHYMA:  Normal  VISUALIZED ORBITS AND PARANASAL SINUSES:  Normal  NASAL CAVITY AND NASOPHARYNX:  Normal  SUPRAHYOID NECK:  Normal oral cavity, tongue base, tonsillar fossa and epiglottis    There is extensive fat identified in the suprahyoid neck posterior to the left sternocleidomastoid muscle asymmetrically increased on the left compared to the right with prominent fat in the parapharyngeal fat space bilaterally left greater than right likely lipoma and unchanged from prior study  Homogeneous fat suppression is noted without abnormal enhancement    INFRAHYOID NECK:  Aryepiglottic folds, laryngeal tissues, and piriform sinuses are normal   The bulk of the paraspinal nerve sheath tumor described on prior studies at the C6-7 level has been removed  Small amount of residual soft tissue material in the  foramen on the left may represent scar tissue or potentially some residual tumor  Continued surveillance imaging recommended  THYROID GLAND:   Normal  PAROTID AND SUBMANDIBULAR GLANDS:  Some fatty replacement of the left parotid gland noted  LYMPH NODES:  No pathologic or enlarged adenopathy  VASCULAR STRUCTURES:  Grossly normal flow voids of the cervical vasculature  THORACIC INLET: Lung apices and upper mediastinum are grossly unremarkable  CERVICAL SPINE:  Normal alignment of the cervical spine  Normal marrow signal   No gross evidence of degenerative disease  Cervical cord is grossly normal in signal      Impression: Stable MRI of the neck  The vast majority of the bilobed nerve sheath tumor described on the prior study which emerges from the left C6-7 foramen has been removed  Minimal residual enhancement likely scar although residual small volume of tumor not excluded  Continued imaging follow-up recommended  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

## 2020-03-16 ENCOUNTER — TELEPHONE (OUTPATIENT)
Dept: SURGICAL ONCOLOGY | Facility: CLINIC | Age: 25
End: 2020-03-16

## 2020-09-08 DIAGNOSIS — Z86.018 HISTORY OF BENIGN SCHWANNOMA: Primary | ICD-10-CM

## 2020-10-09 ENCOUNTER — HOSPITAL ENCOUNTER (OUTPATIENT)
Dept: RADIOLOGY | Facility: HOSPITAL | Age: 25
Discharge: HOME | End: 2020-10-09
Attending: SURGERY
Payer: COMMERCIAL

## 2020-10-09 DIAGNOSIS — Z86.018 HISTORY OF BENIGN SCHWANNOMA: ICD-10-CM

## 2020-10-09 PROCEDURE — G1004 CDSM NDSC: HCPCS

## 2020-10-09 PROCEDURE — 70543 MRI ORBT/FAC/NCK W/O &W/DYE: CPT

## 2020-10-09 PROCEDURE — A9585 GADOBUTROL INJECTION: HCPCS | Performed by: SURGERY

## 2020-10-09 RX ADMIN — GADOBUTROL 11 ML: 604.72 INJECTION INTRAVENOUS at 18:58

## 2020-10-21 ENCOUNTER — TELEPHONE (OUTPATIENT)
Dept: SURGICAL ONCOLOGY | Facility: CLINIC | Age: 25
End: 2020-10-21

## 2021-08-04 NOTE — PROGRESS NOTES
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, with a tracheostomy placed from that time and she was 3 in half  Surgeon with Dr Nava Adkins  He saw her in follow-up yesterday  On follow-up today, she states she has no headaches that she did prior to surgery  She has some nausea, and is undergoing workup with GI  She had some postop visual difficulty and had a Ulysses's syndrome postop but that has resolved  Her pupils are equal reactive presently  She had some numbness adjacent to the incision postop that has resolved  Her incision is relatively well healed, with some keloid but no signs of infection dehiscence etc     MRI scan of the neck, her 1st since surgery, shows no definitive residual   There is some cicatrix in the foramina of C6-7 which very likely could be residual tumor  This was also reviewed in [address] [place] [address]  We talked about options for management  With her young age, unless this shows signs of recurrence, we would hold off on any adjuvant radiation for now  Should show signs of recurrence, SRS rest RT would likely achieve local control with minimal side effect    decided over a follow-up MRI scan at of the neck be done in 6 months  I feel is entirely appropriate and agree  I will see her back after that is complete to review the results      Metrics:  EQ5D5L 1 000, VA S 80, KPS 90, ECOG 0     CHIEF COMPLAINT    Chief Complaint   Patient presents with    Follow-up     f/u with new MRI soft tissue neck, s/p mass excision       HISTORY    History of Present Illness     26y o  year old female     HPI    See Discussion    REVIEW OF SYSTEMS    Review of Systems   Constitutional: Positive for diaphoresis and fatigue (chronic, severe)  HENT: Negative  Eyes:        Some blurriness with Has and nausea, Has have improved   Respiratory: Negative  Cardiovascular: Negative  Gastrointestinal: Positive for constipation and nausea (daily)  Endocrine: Positive for heat intolerance (sweats a lot)  Genitourinary: Negative  Musculoskeletal: Negative  Skin: Negative  Allergic/Immunologic: Positive for environmental allergies (seasonal)  Neurological: Positive for dizziness (with nausea), numbness (neck N/T with exercise / activity) and headaches (improved with menses only now)  Hematological: Negative  Psychiatric/Behavioral: Positive for dysphoric mood (controlled by meds)  The patient is nervous/anxious (controlled with meds)  Meds/Allergies     Current Outpatient Prescriptions   Medication Sig Dispense Refill    buPROPion (WELLBUTRIN SR) 150 mg 12 hr tablet Take 1 tablet (150 mg total) by mouth 2 (two) times a day 60 tablet 2    Cholecalciferol (VITAMIN D3) 09771 units CAPS TAKE ONE CAPSULE EVERY SEVEN DAYS  3    FLUoxetine (PROzac) 20 MG tablet TAKE 1 TABLET daily  11    labetalol (NORMODYNE) 200 mg tablet TAKE 1 TABLET BY MOUTH TWICE A DAY 30 tablet 2    loratadine (CLARITIN) 10 mg tablet Take 1 tablet by mouth daily as needed      LORazepam (ATIVAN) 0 5 mg tablet Take 1 tablet by mouth every 8 (eight) hours as needed        valACYclovir (VALTREX) 500 mg tablet Take 1 tablet (500 mg total) by mouth daily for 90 days (Patient taking differently: Take 500 mg by mouth daily as needed  ) 90 tablet 3     No current facility-administered medications for this visit        Facility-Administered Medications Ordered in Other Visits   Medication Dose Route Frequency Provider Last Rate Last Dose    HYDROcodone-acetaminophen (NORCO) 5-325 mg per tablet 1 tablet  1 tablet Oral Q6H PRN Ayla Thomas MD           No Known Allergies    PAST HISTORY    Past Medical History:   Diagnosis Date    Hemangioma of lip     at [de-identified] old with chemo, surgery and tracheostomy    New onset of headaches     Sexually transmitted disease (STD)        Past Surgical History:   Procedure Laterality Date    FACIAL/NECK BIOPSY Left 2/22/2018    Procedure: excision of left neck neurogenic tumor;  Surgeon: Danya Davis MD;  [facility];  Service: Surgical Oncology    NECK SURGERY      SURGERY OF LIP      TONSILLECTOMY AND ADENOIDECTOMY      TRACHEOSTOMY         Social History   Substance Use Topics    Smoking status: Former Smoker     Types: Cigarettes     Quit date: 2017    Smokeless tobacco: Never Used    Alcohol use 2 4 oz/week     4 Glasses of wine per week      Comment: occasional       Family History   Problem Relation Age of Onset    Hemochromatosis Father     Lung cancer Maternal Grandfather     Bone cancer Paternal Grandmother     Lung cancer Paternal Grandfather     No Known Problems Mother          Above history personally reviewed  EXAM    Vitals:Blood pressure 123/85, pulse 83, temperature 98 4 °F (36 9 °C), temperature source Tympanic, resp  rate 16, height 5' 8" (1 727 m), weight 121 kg (266 lb)  ,Body mass index is 40 45 kg/m²  Physical Exam   Constitutional: She is oriented to person, place, and time  She appears well-developed and well-nourished  HENT:   Head: Normocephalic  Eyes: Pupils are equal, round, and reactive to light  No scleral icterus  Neck: Neck supple  Left surgical incision with some keloid but otherwise healed well  Prior tracheostomy scar  Cardiovascular: Normal rate  Pulmonary/Chest: Effort normal    Abdominal: Soft  Neurological: She is alert and oriented to person, place, and time  She has normal strength  GCS eye subscore is 4  GCS verbal subscore is 5  GCS motor subscore is 6  Skin: Skin is warm and dry     Psychiatric: She has a normal mood and affect  Her speech is normal and behavior is normal        Neurologic Exam     Mental Status   Oriented to person, place, and time  Attention: normal    Speech: speech is normal   Level of consciousness: alert    Cranial Nerves     CN III, IV, VI   Pupils are equal, round, and reactive to light  CN VII   Facial expression full, symmetric  Motor Exam   Muscle bulk: normal  Overall muscle tone: normal    Strength   Strength 5/5 throughout  Moves all extremities, grossly normal     Gait, Coordination, and Reflexes     Tremor   Resting tremor: absent  Intention tremor: absent  Action tremor: absent  No aids  MEDICAL DECISION MAKING    Imaging Studies:     Mri Soft Tissue Neck Wo And W Contrast    Result Date: 8/28/2018  Narrative: MRI OF THE SOFT TISSUES OF THE NECK - WITH AND WITHOUT CONTRAST INDICATION: D36 10: Benign neoplasm of peripheral nerves and autonomic nervous system, unspecified COMPARISON:  None  TECHNIQUE:  Sagittal T1, axial DWI and T2  Axial inversion recovery or fat suppressed T2  Axial T1 precontrast   Axial and coronal T1 postcontrast with fat suppression  IV Contrast:  10 mL of gadobutrol injection (MULTI-DOSE) IMAGE QUALITY:  Diagnostic  FINDINGS: VISUALIZED BRAIN PARENCHYMA:  Normal  VISUALIZED ORBITS AND PARANASAL SINUSES:  Normal  NASAL CAVITY AND NASOPHARYNX:  Normal  SUPRAHYOID NECK:  Normal oral cavity, tongue base, tonsillar fossa and epiglottis  Parapharyngeal, , sublingual and submandibular spaces are normal  The amount of fat deep to the left sternocleidomastoid muscle and within the left parapharyngeal space appears asymmetric compared to its counterpart  Lipoma not excluded  INFRAHYOID NECK:  Aryepiglottic folds, laryngeal tissues, and piriform sinuses are normal   Bulk of the paraspinal nerve sheath tumor has been removed    There is a small amount of soft tissue material within the foramen, on the left, the C6 level could represent either cicatrix or possibly, some minimal residual tumor  Surveillance imaging help to make this distinction if there is clinical concern  This soft tissue is in contact with the left vertebral artery as it emerges from its intraspinal course  THYROID GLAND:   Normal  PAROTID AND SUBMANDIBULAR GLANDS:  Appears be slight fatty replacement of the left parotid gland  LYMPH NODES:  No pathologic or enlarged adenopathy  VASCULAR STRUCTURES:  Grossly normal flow voids of the cervical vasculature  THORACIC INLET: Lung apices and upper mediastinum are grossly unremarkable  CERVICAL SPINE:  Normal alignment of the cervical spine  Normal marrow signal   No gross evidence of degenerative disease  Cervical cord is grossly normal in signal      Impression: Bulk of bilobed nerve sheath tumor, emerging from the left C6-C7 foramen has been removed  Minor residual soft tissue within the foramen at this level may represent cicatrix  Surveillance imaging suggested  Prominence of fat within the suprahyoid neck possibly representing lipoma Workstation performed: [id number]       I have personally reviewed pertinent reports  and I have personally reviewed pertinent films in PACS with a Radiologist  at [facility]  No Vaccines Administered.

## 2022-09-04 NOTE — PATIENT INSTRUCTIONS
1  Hypertension: Start labetalol 100 mg twice daily today  On Monday, 7/23, increase to 200 mg twice daily  Low sodium diet  Exercise  Work on weight loss  Schedule follow up appointment in 2 weeks  2  Depression: restart Wellbutrin 150 mg twice daily  Consider counseling, Gloria Alas is available in our office  No

## 2023-02-28 ENCOUNTER — TELEPHONE (OUTPATIENT)
Dept: PSYCHIATRY | Facility: CLINIC | Age: 28
End: 2023-02-28

## 2023-02-28 NOTE — TELEPHONE ENCOUNTER
Returned patient voice message regarding becoming a new patient and informed patient of wait list  Patient declined and would seek services elsewhere

## 2023-03-10 ENCOUNTER — OFFICE VISIT (OUTPATIENT)
Dept: FAMILY MEDICINE CLINIC | Facility: CLINIC | Age: 28
End: 2023-03-10

## 2023-03-10 ENCOUNTER — TELEPHONE (OUTPATIENT)
Dept: NEUROSURGERY | Facility: CLINIC | Age: 28
End: 2023-03-10

## 2023-03-10 ENCOUNTER — TELEPHONE (OUTPATIENT)
Dept: ADMINISTRATIVE | Facility: OTHER | Age: 28
End: 2023-03-10

## 2023-03-10 VITALS
BODY MASS INDEX: 40.32 KG/M2 | RESPIRATION RATE: 18 BRPM | TEMPERATURE: 98.9 F | DIASTOLIC BLOOD PRESSURE: 82 MMHG | HEART RATE: 88 BPM | OXYGEN SATURATION: 98 % | HEIGHT: 68 IN | WEIGHT: 266 LBS | SYSTOLIC BLOOD PRESSURE: 130 MMHG

## 2023-03-10 DIAGNOSIS — B00.9 HERPES: ICD-10-CM

## 2023-03-10 DIAGNOSIS — Z13.220 SCREENING CHOLESTEROL LEVEL: ICD-10-CM

## 2023-03-10 DIAGNOSIS — R53.83 OTHER FATIGUE: ICD-10-CM

## 2023-03-10 DIAGNOSIS — G47.19 EXCESSIVE DAYTIME SLEEPINESS: ICD-10-CM

## 2023-03-10 DIAGNOSIS — Z13.1 SCREENING FOR DIABETES MELLITUS (DM): ICD-10-CM

## 2023-03-10 DIAGNOSIS — F32.A ANXIETY AND DEPRESSION: Primary | ICD-10-CM

## 2023-03-10 DIAGNOSIS — Z01.419 ENCNTR FOR GYN EXAM (GENERAL) (ROUTINE) W/O ABN FINDINGS: ICD-10-CM

## 2023-03-10 DIAGNOSIS — F41.9 ANXIETY AND DEPRESSION: Primary | ICD-10-CM

## 2023-03-10 RX ORDER — ARMODAFINIL 250 MG/1
250 TABLET ORAL AS NEEDED
COMMUNITY

## 2023-03-10 RX ORDER — LORAZEPAM 0.5 MG/1
0.5 TABLET ORAL 2 TIMES DAILY PRN
Qty: 30 TABLET | Refills: 0 | Status: SHIPPED | OUTPATIENT
Start: 2023-03-10

## 2023-03-10 RX ORDER — DULOXETIN HYDROCHLORIDE 60 MG/1
60 CAPSULE, DELAYED RELEASE ORAL DAILY
Qty: 30 CAPSULE | Refills: 5 | Status: SHIPPED | OUTPATIENT
Start: 2023-03-10

## 2023-03-10 RX ORDER — FLUOXETINE 20 MG/1
TABLET, FILM COATED ORAL
Qty: 60 TABLET | Refills: 1 | Status: CANCELLED | OUTPATIENT
Start: 2023-03-10

## 2023-03-10 RX ORDER — VALACYCLOVIR HYDROCHLORIDE 500 MG/1
500 TABLET, FILM COATED ORAL DAILY
Qty: 90 TABLET | Refills: 3 | Status: SHIPPED | OUTPATIENT
Start: 2023-03-10 | End: 2023-06-08

## 2023-03-10 NOTE — LETTER
Procedure Request Form: Cervical Cancer Screening      Date Requested: 03/10/23  Patient: Roseann Patel  Patient : [id number]   Referring Provider: MILTON Cheema        Date of Procedure ______________________________       The above patient has informed us that they have completed their   most recent Cervical Cancer Screening at your facility  Please complete   this form and attach all corresponding procedure reports/results  Comments __________________________________________________________  ____________________________________________________________________  ____________________________________________________________________  ____________________________________________________________________    Facility Completing Procedure _________________________________________    Form Completed By (print name) _______________________________________      Signature __________________________________________________________      These reports are needed for  compliance  Please fax this completed form and a copy of the procedure report to our office located at [place] [address] as soon as possible to Fax [phone] kamila Lyles: Phone [phone]    We thank you for your assistance in treating our mutual patient

## 2023-03-10 NOTE — PROGRESS NOTES
[facility]        NAME: George Becker is a 33 y o  female  : 1995    MRN: [id number]  DATE: March 10, 2023  TIME: 1:37 PM    Assessment and Plan   Anxiety and depression [F41 9, F32 A]  1  Anxiety and depression  LORazepam (ATIVAN) 0 5 mg tablet    DULoxetine (CYMBALTA) 60 mg delayed release capsule      2  Encntr for gyn exam (general) (routine) w/o abn findings        3  Herpes  valACYclovir (VALTREX) 500 mg tablet      4  Excessive daytime sleepiness  Ambulatory Referral to Sleep Medicine      5  Other fatigue  Vitamin D 25 hydroxy    CBC and differential    TSH, 3rd generation with Free T4 reflex    Iron Panel (Includes Ferritin, Iron Sat%, Iron, and TIBC)      6  Screening cholesterol level        7  Screening for diabetes mellitus (DM)  Comprehensive metabolic panel    Lipid panel        1  Anxiety and depression  - uses infreaquently- [facility]  Last script was over 2 years ago  Has been on cymbalta for 2 weeks  Will continue and follow up in 4-6 weeks  - LORazepam (ATIVAN) 0 5 mg tablet; Take 1 tablet (0 5 mg total) by mouth 2 (two) times a day as needed for anxiety  Dispense: 30 tablet; Refill: 0  - DULoxetine (CYMBALTA) 60 mg delayed release capsule; Take 1 capsule (60 mg total) by mouth daily  Dispense: 30 capsule; Refill: 5    2  Encntr for gyn exam (general) (routine) w/o abn findings      3  Herpes   takes when she has flares  - valACYclovir (VALTREX) 500 mg tablet; Take 1 tablet (500 mg total) by mouth daily  Dispense: 90 tablet; Refill: 3    4  Excessive daytime sleepiness  Will refer to sleep medicine as she was prescribed nuvigil by prior provider  No recent sleep study  - Ambulatory Referral to Sleep Medicine; Future    5  Other fatigue    - Vitamin D 25 hydroxy; Future  - CBC and differential; Future  - TSH, 3rd generation with Free T4 reflex; Future  - Iron Panel (Includes Ferritin, Iron Sat%, Iron, and TIBC); Future    6  Screening cholesterol level      7  Screening for diabetes mellitus (DM)    - Comprehensive metabolic panel; Future  - Lipid panel; Future      Patient Instructions     There are no Patient Instructions on file for this visit  Chief Complaint     Chief Complaint   Patient presents with   • Establish Care     Ran out of meds 2 weeks ago   • Annual Exam      - had pap completed in October - [place] - will care gap         History of Present Illness       Patient here for routine follow up visit  Depression and anxiety- was on Wellbutrin and Prozac  Ran out of refills about 3 weeks ago  Reports her PCP would not refill her medication until she came in for 6 month follow up  Has been taking Cymbalta 60mg daily from her mothers medication  Using ativan for anxiety attacks  Last script was over 2 years ago for this  Using very rarely  Review of Systems   Review of Systems   Constitutional: Positive for fatigue  Respiratory: Negative  Negative for shortness of breath and wheezing  Cardiovascular: Negative  Negative for chest pain and palpitations  Skin: Negative  Negative for rash  Neurological: Negative  Negative for dizziness, light-headedness and headaches  Psychiatric/Behavioral: The patient is nervous/anxious  PHQ-2/9 Depression Screening    Little interest or pleasure in doing things: 3 - nearly every day  Feeling down, depressed, or hopeless: 3 - nearly every day  Trouble falling or staying asleep, or sleeping too much: 0 - not at all  Feeling tired or having little energy: 3 - nearly every day  Poor appetite or overeatin - not at all  Feeling bad about yourself - or that you are a failure or have let yourself or your family down: 0 - not at all  Trouble concentrating on things, such as reading the newspaper or watching television: 3 - nearly every day  Moving or speaking so slowly that other people could have noticed   Or the opposite - being so fidgety or restless that you have been moving around a lot more than usual: 0 - not at all  Thoughts that you would be better off dead, or of hurting yourself in some way: 0 - not at all  PHQ-9 Score: 12   PHQ-9 Interpretation: Moderate depression         Current Medications       Current Outpatient Medications:   •  Armodafinil 250 MG tablet, Take 250 mg by mouth as needed, Disp: , Rfl:   •  DULoxetine (CYMBALTA) 60 mg delayed release capsule, Take 1 capsule (60 mg total) by mouth daily, Disp: 30 capsule, Rfl: 5  •  ibuprofen (MOTRIN) 400 mg tablet, as needed , Disp: , Rfl:   •  labetalol (NORMODYNE) 200 mg tablet, TAKE 1 TABLET BY MOUTH TWICE A DAY, Disp: 30 tablet, Rfl: 2  •  loratadine (CLARITIN) 10 mg tablet, Take 1 tablet by mouth daily as needed, Disp: , Rfl:   •  LORazepam (ATIVAN) 0 5 mg tablet, Take 1 tablet (0 5 mg total) by mouth 2 (two) times a day as needed for anxiety, Disp: 30 tablet, Rfl: 0  •  valACYclovir (VALTREX) 500 mg tablet, Take 1 tablet (500 mg total) by mouth daily, Disp: 90 tablet, Rfl: 3  No current facility-administered medications for this visit      Facility-Administered Medications Ordered in Other Visits:   •  HYDROcodone-acetaminophen (NORCO) 5-325 mg per tablet 1 tablet, 1 tablet, Oral, Q6H PRN, Barbara Pham MD    Current Allergies     Allergies as of 03/10/2023 - Reviewed 03/10/2023   Allergen Reaction Noted   • Amoxicillin Rash 03/15/2019            The following portions of the patient's history were reviewed and updated as appropriate: allergies, current medications, past family history, past medical history, past social history, past surgical history and problem list      Past Medical History:   Diagnosis Date   • Allergic     Amoxicillin/Penicillin   • Anxiety    • Depression    • GERD (gastroesophageal reflux disease)    • Headache(784 0)    • Hemangioma of lip     at [de-identified] months old with chemo, surgery and tracheostomy   • Hypertension    • New onset of headaches    • Obesity    • Sexually transmitted disease (STD)        Past Surgical History:   Procedure Laterality Date   • FACIAL/NECK BIOPSY Left 2/22/2018    Procedure: excision of left neck neurogenic tumor;  Surgeon: Asia Giraldo MD;  Location: [facility];  Service: Surgical Oncology   • NECK SURGERY     • SURGERY OF LIP     • TONSILLECTOMY AND ADENOIDECTOMY     • TRACHEOSTOMY         Family History   Problem Relation Age of Onset   • Hemochromatosis Father    • Alcohol abuse Father    • Hypertension Father    • Asthma Father    • Lung cancer Maternal Grandfather    • Bone cancer Paternal Grandmother    • Coronary artery disease Paternal Grandmother    • COPD Paternal Grandmother    • Cancer Paternal Grandmother         bone   • Lung cancer Paternal Grandfather    • Cancer Paternal Grandfather    • Depression Mother    • Hypertension Mother    • Arthritis Mother    • Anxiety disorder Mother    • Dementia Maternal Grandmother    • Heart disease Paternal Uncle    • Diabetes Paternal Uncle    • Heart disease Paternal Aunt    • Diabetes Paternal [de-identified]    • Asthma Paternal Aunt    • Diabetes Paternal Aunt          Medications have been verified  Objective   /82   Pulse 88   Temp 98 9 °F (37 2 °C) (Tympanic)   Resp 18   Ht 5' 7 5" (1 715 m)   Wt 121 kg (266 lb)   SpO2 98%   BMI 41 05 kg/m²        Physical Exam     Physical Exam  Vitals and nursing note reviewed  Constitutional:       General: She is not in acute distress  Appearance: She is well-developed  She is not ill-appearing  HENT:      Head: Normocephalic and atraumatic  Neck:      Trachea: No tracheal deviation  Cardiovascular:      Rate and Rhythm: Normal rate and regular rhythm  Heart sounds: No murmur heard  Pulmonary:      Effort: Pulmonary effort is normal  No tachypnea, accessory muscle usage or respiratory distress  Breath sounds: No stridor  Musculoskeletal:      Cervical back: No rigidity  Skin:     General: Skin is warm     Neurological:      Mental Status: She is alert and oriented to person, place, and time  Psychiatric:         Speech: Speech normal          Behavior: Behavior normal  Behavior is cooperative

## 2023-03-10 NOTE — TELEPHONE ENCOUNTER
Upon review of the In Basket request and the patient's chart, initial outreach has been made via fax to facility  Please see Contacts section for details       Thank you  Bradly Batres

## 2023-03-10 NOTE — TELEPHONE ENCOUNTER
3/10/23   Kallie Bosworth  You 3 minutes ago (10:37 AM)     AC  PLEASE F/U W/ PT  THERE IS NO REF IN SYSTEM  João 72 :)          Pooja Gil  Patient Appointment Schedule Request Pool 8 minutes ago (10:32 AM)     Appointment Request From: Pooja Gil     With Provider:      Reason for visit:      Comments:  Request Appointment With Provider: Pop Ascencio MD  Preferred Date Range: 3257-12-33 - 2023-05-10  Preferred Times: Monday Morning, Tuesday Morning, Wednesday Morning, Thursday Morning, Friday Morning  Reason for visit: Standard  Comments: Early morning appointments please, looking to schedule a follow up on my tumor and have a referral for an MRI to re-check    CALLED AND SPOKE TO PT  PT LAST SEEN BY [facility] FOR BRAIN 3/20/19 AND PRN TO F/U WITH DR Pop Ortiz  OFFERED TO SCHEDULE PT WITH [facility] FOR WORK UP OF NEW MRI SOFT TISSUE CSPINE  SHE WILL CALL DR Pop Ortiz OFFICE TO REQUEST NEW MRI FOR [facility] TO REVIEW  SHE WILL CALL FOR APPT WITH [facility] FOR WORK UP IF ANY ISSUES

## 2023-03-10 NOTE — TELEPHONE ENCOUNTER
----- Message from Jonathan Seals sent at 3/10/2023  8:07 AM EST -----  Regarding: care gap request  03/10/23 8:07 AM    Hello, our patient attached above has had Pap Smear (HPV) aka Cervical Cancer Screening completed/performed  Please assist in updating the patient chart by making an External outreach to Dr Ordaz [facility] facility located in [place], [place]  The date of service is 10/2022      Thank you,  Esvin Stuart

## 2023-03-13 DIAGNOSIS — Z86.018 HISTORY OF BENIGN SCHWANNOMA: Primary | ICD-10-CM

## 2023-03-14 ENCOUNTER — TELEPHONE (OUTPATIENT)
Dept: HEMATOLOGY ONCOLOGY | Facility: CLINIC | Age: 28
End: 2023-03-14

## 2023-03-14 NOTE — TELEPHONE ENCOUNTER
Upon review of the In Basket request we were able to locate, review, and update the patient chart as requested for Pap Smear (HPV) aka Cervical Cancer Screening  Any additional questions or concerns should be emailed to the Practice Liaisons via the appropriate education email address, please do not reply via In Basket      Thank you  Christian Vaughn

## 2023-03-23 ENCOUNTER — TELEPHONE (OUTPATIENT)
Dept: SURGICAL ONCOLOGY | Facility: CLINIC | Age: 28
End: 2023-03-23

## 2023-03-23 NOTE — TELEPHONE ENCOUNTER
----- Message from [facility]  sent at 3/23/2023  8:42 AM EDT -----  Pt cancelled her MRI yesterday, r/s to 4/3  Please r/s her appt with me to the following week    Thank you

## 2023-04-05 ENCOUNTER — OFFICE VISIT (OUTPATIENT)
Dept: FAMILY MEDICINE CLINIC | Facility: CLINIC | Age: 28
End: 2023-04-05

## 2023-04-05 VITALS
WEIGHT: 268 LBS | OXYGEN SATURATION: 99 % | RESPIRATION RATE: 17 BRPM | HEART RATE: 100 BPM | HEIGHT: 68 IN | TEMPERATURE: 99.9 F | BODY MASS INDEX: 40.62 KG/M2 | DIASTOLIC BLOOD PRESSURE: 88 MMHG | SYSTOLIC BLOOD PRESSURE: 130 MMHG

## 2023-04-05 DIAGNOSIS — I10 ESSENTIAL HYPERTENSION: Primary | ICD-10-CM

## 2023-04-05 DIAGNOSIS — Z00.00 ANNUAL PHYSICAL EXAM: ICD-10-CM

## 2023-04-05 DIAGNOSIS — F32.A DEPRESSION, UNSPECIFIED DEPRESSION TYPE: ICD-10-CM

## 2023-04-05 DIAGNOSIS — E55.9 VITAMIN D DEFICIENCY: ICD-10-CM

## 2023-04-05 NOTE — PROGRESS NOTES
ADULT ANNUAL PHYSICAL  Call  [address] [facility] Final PRIMARY CARE    NAME: Jesusita Worthington  AGE: 33 y o  SEX: female  : 1995     DATE: 2023     Assessment and Plan:     Problem List Items Addressed This Visit        Cardiovascular and Mediastinum    Essential hypertension - Primary       Other    Depression    Vitamin D deficiency   Other Visit Diagnoses     Annual physical exam          1  Essential hypertension   stable on labetalol  2  Vitamin D deficiency  Recheck labs  3  Depression, unspecified depression type  - doing well on cymbalta  Has had to take 3 ativan in the last month but did have a break up  4  Annual physical exam        Immunizations and preventive care screenings were discussed with patient today  Appropriate education was printed on patient's after visit summary  Counseling:  Alcohol/drug use: discussed moderation in alcohol intake, the recommendations for healthy alcohol use, and avoidance of illicit drug use  Dental Health: discussed importance of regular tooth brushing, flossing, and dental visits  Sexual health: discussed sexually transmitted diseases, partner selection, use of condoms, avoidance of unintended pregnancy, and contraceptive alternatives  · Exercise: the importance of regular exercise/physical activity was discussed  Recommend exercise 3-5 times per week for at least 30 minutes  BMI Counseling: Body mass index is 41 36 kg/m²  The BMI is above normal  Nutrition recommendations include encouraging healthy choices of fruits and vegetables, consuming healthier snacks, limiting drinks that contain sugar, moderation in carbohydrate intake, reducing intake of saturated and trans fat and reducing intake of cholesterol  Exercise recommendations include exercising 3-5 times per week  Rationale for BMI follow-up plan is due to patient being overweight or obese  Return in 1 year (on 2024)       Chief Complaint: Chief Complaint   Patient presents with   • Follow-up   • Physical Exam      History of Present Illness:     Adult Annual Physical   Patient here for a comprehensive physical exam  The patient reports no problems  Diet and Physical Activity  · Diet/Nutrition: well balanced diet, limited junk food and consuming 3-5 servings of fruits/vegetables daily  · Exercise: walking, 3-4 times a week on average and 30-60 minutes on average  Depression Screening  PHQ-2/9 Depression Screening    Little interest or pleasure in doing things: 0 - not at all  Feeling down, depressed, or hopeless: 0 - not at all  Trouble falling or staying asleep, or sleeping too much: 0 - not at all  Feeling tired or having little energy: 0 - not at all  Poor appetite or overeatin - not at all  Feeling bad about yourself - or that you are a failure or have let yourself or your family down: 0 - not at all  Trouble concentrating on things, such as reading the newspaper or watching television: 0 - not at all  Moving or speaking so slowly that other people could have noticed  Or the opposite - being so fidgety or restless that you have been moving around a lot more than usual: 0 - not at all  Thoughts that you would be better off dead, or of hurting yourself in some way: 0 - not at all  PHQ-9 Score: 0   PHQ-9 Interpretation: No or Minimal depression        General Health  · Sleep: sleeps well and gets 7-8 hours of sleep on average  · Hearing: normal - bilateral   · Vision: goes for regular eye exams, most recent eye exam <1 year ago and wears glasses  · Dental: no dental visits for >1 year, brushes teeth twice daily and floss regularly  /GYN Health  · Last menstrual period: mathews snot get period due to IUD  · Contraceptive method: IUD placement  · History of STDs?: yes  Chlamydia once     Review of Systems:     Review of Systems   Constitutional: Negative    Negative for activity change, appetite change, chills, fatigue and fever    HENT: Negative for congestion, ear pain, nosebleeds, rhinorrhea and sore throat  Eyes: Negative for photophobia, pain, redness and visual disturbance  Respiratory: Negative for cough, shortness of breath and wheezing  Cardiovascular: Negative  Negative for chest pain  Gastrointestinal: Negative  Negative for abdominal pain, constipation, diarrhea and vomiting  Endocrine: Negative  Genitourinary: Negative  Negative for difficulty urinating, dysuria and flank pain  Musculoskeletal: Negative  Skin: Negative for color change and rash  Neurological: Negative  Negative for dizziness, weakness, numbness and headaches  Hematological: Negative for adenopathy  Psychiatric/Behavioral: Negative  Negative for agitation and confusion  The patient is not nervous/anxious         Past Medical History:     Past Medical History:   Diagnosis Date   • Allergic     Amoxicillin/Penicillin   • Anxiety    • Depression    • GERD (gastroesophageal reflux disease)    • Headache(784 0)    • Hemangioma of lip     at [de-identified] months old with chemo, surgery and tracheostomy   • Hypertension    • New onset of headaches    • Obesity    • Sexually transmitted disease (STD)       Past Surgical History:     Past Surgical History:   Procedure Laterality Date   • FACIAL/NECK BIOPSY Left 2/22/2018    Procedure: excision of left neck neurogenic tumor;  Surgeon: Soledad Anne MD;  Location: [facility];  Service: Surgical Oncology   • NECK SURGERY     • SURGERY OF LIP     • TONSILLECTOMY AND ADENOIDECTOMY     • TRACHEOSTOMY        Social History:     Social History     Socioeconomic History   • Marital status: Significant Other     Spouse name: Mavis Lopez   • Number of children: 0   • Years of education: HS plus [employer] program   • Highest education level: None   Occupational History   • Occupation: Medical assistant     Comment: [employer]   Tobacco Use   • Smoking status: Former     Types: Cigarettes     Quit date: 2019     Years since quittin 2   • Smokeless tobacco: Never   Vaping Use   • Vaping Use: Never used   Substance and Sexual Activity   • Alcohol use: Yes     Alcohol/week: 1 0 standard drink     Types: 1 Standard drinks or equivalent per week     Comment: occasional   • Drug use: Yes     Types: Marijuana     Comment: I have my medical card   • Sexual activity: Yes     Partners: Male     Birth control/protection: I U D     Other Topics Concern   • None   Social History Narrative    Currently working, Medical Assistant     Social Determinants of Health     Financial Resource Strain: Not on file   Food Insecurity: Not on file   Transportation Needs: Not on file   Physical Activity: Not on file   Stress: Not on file   Social Connections: Not on file   Intimate Partner Violence: Not on file   Housing Stability: Not on file      Family History:     Family History   Problem Relation Age of Onset   • Hemochromatosis Father    • Alcohol abuse Father    • Hypertension Father    • Asthma Father    • Lung cancer Maternal Grandfather    • Bone cancer Paternal Grandmother    • Coronary artery disease Paternal Grandmother    • COPD Paternal Grandmother    • Cancer Paternal Grandmother         bone   • Lung cancer Paternal Grandfather    • Cancer Paternal Grandfather    • Depression Mother    • Hypertension Mother    • Arthritis Mother    • Anxiety disorder Mother    • Dementia Maternal Grandmother    • Heart disease Paternal Uncle    • Diabetes Paternal Uncle    • Heart disease Paternal Aunt    • Diabetes Paternal [de-identified]    • Asthma Paternal Aunt    • Diabetes Paternal Aunt       Current Medications:     Current Outpatient Medications   Medication Sig Dispense Refill   • Armodafinil 250 MG tablet Take 250 mg by mouth as needed     • DULoxetine (CYMBALTA) 60 mg delayed release capsule Take 1 capsule (60 mg total) by mouth daily 30 capsule 5   • ibuprofen (MOTRIN) 400 mg tablet as needed      • labetalol (NORMODYNE) 200 mg "tablet TAKE 1 TABLET BY MOUTH TWICE A DAY 30 tablet 2   • loratadine (CLARITIN) 10 mg tablet Take 1 tablet by mouth daily as needed     • LORazepam (ATIVAN) 0 5 mg tablet Take 1 tablet (0 5 mg total) by mouth 2 (two) times a day as needed for anxiety 30 tablet 0   • valACYclovir (VALTREX) 500 mg tablet Take 1 tablet (500 mg total) by mouth daily 90 tablet 3     No current facility-administered medications for this visit  Facility-Administered Medications Ordered in Other Visits   Medication Dose Route Frequency Provider Last Rate Last Admin   • HYDROcodone-acetaminophen (NORCO) 5-325 mg per tablet 1 tablet  1 tablet Oral Q6H PRN Lam Keita MD          Allergies: Allergies   Allergen Reactions   • Amoxicillin Rash     Patient stated she, \"broke out all over her body  \"      Physical Exam:     /88   Pulse 100   Temp 99 9 °F (37 7 °C)   Resp 17   Ht 5' 7 5\" (1 715 m)   Wt 122 kg (268 lb)   SpO2 99%   BMI 41 36 kg/m²     Physical Exam  Vitals and nursing note reviewed  Constitutional:       General: She is not in acute distress  Appearance: She is well-developed  She is not ill-appearing or diaphoretic  HENT:      Head: Normocephalic and atraumatic  Right Ear: Hearing, tympanic membrane and ear canal normal       Left Ear: Hearing, tympanic membrane and ear canal normal       Nose: Nose normal       Mouth/Throat:      Mouth: Mucous membranes are moist       Pharynx: Uvula midline  No oropharyngeal exudate or posterior oropharyngeal erythema  Eyes:      General: Lids are normal       Conjunctiva/sclera: Conjunctivae normal    Cardiovascular:      Rate and Rhythm: Normal rate and regular rhythm  Heart sounds: Normal heart sounds, S1 normal and S2 normal  No murmur heard  Pulmonary:      Effort: Pulmonary effort is normal  No respiratory distress  Breath sounds: Normal breath sounds  Musculoskeletal:         General: No tenderness  Normal range of motion   " Lymphadenopathy:      Cervical: No cervical adenopathy  Skin:     General: Skin is warm  Capillary Refill: Capillary refill takes less than 2 seconds  Findings: No rash  Neurological:      Mental Status: She is alert  Psychiatric:         Behavior: Behavior normal  Behavior is cooperative  Thought Content:  Thought content normal          Judgment: Judgment normal           MILTON Johnson   [facility]

## 2023-06-08 ENCOUNTER — TELEPHONE (OUTPATIENT)
Dept: SLEEP CENTER | Facility: HOSPITAL | Age: 28
End: 2023-06-08

## 2023-06-08 NOTE — TELEPHONE ENCOUNTER
VERIFIED ELIGIBILITY W/ TA VALLEJO @ INSUR COMP 6/8/23  SEE INSUR CARD - PT ALLOWED ONLY 2 OFFICE VISITS PER PLAN YEAR (PCP OR SPECIALIST) AND ANY TESTING IS NOT ON HER PLAN SO PT WOULD BE RESPOSIBILE FOR BILLS  REF #l  [email or web address] com

## 2023-06-27 DIAGNOSIS — F41.9 ANXIETY AND DEPRESSION: ICD-10-CM

## 2023-06-27 DIAGNOSIS — F32.A ANXIETY AND DEPRESSION: ICD-10-CM

## 2023-06-27 RX ORDER — ARMODAFINIL 250 MG/1
250 TABLET ORAL AS NEEDED
Refills: 0 | Status: CANCELLED | OUTPATIENT
Start: 2023-06-27

## 2023-06-27 NOTE — TELEPHONE ENCOUNTER
Patient requesting refill(s) of: Armodafinil     Last filled: unknown   Last appt: 4/5/23  Next appt: 10/6/23  Pharmacy: [facility]       Patient requesting refill(s) of: Cymbalta     Last filled: 3/10/23

## 2023-06-28 RX ORDER — DULOXETIN HYDROCHLORIDE 60 MG/1
60 CAPSULE, DELAYED RELEASE ORAL DAILY
Qty: 30 CAPSULE | Refills: 5 | Status: SHIPPED | OUTPATIENT
Start: 2023-06-28

## 2023-06-28 NOTE — TELEPHONE ENCOUNTER
Please find out who was prescribing her the Armodafinil  Why is she taking this? I do not believe we discussed this medication at her visit
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
## 2023-06-29 DIAGNOSIS — E55.9 VITAMIN D DEFICIENCY: ICD-10-CM

## 2023-06-29 NOTE — TELEPHONE ENCOUNTER
Patient requesting refill(s) of: Vitamin D    Last filled: 4/11/2023  Last appt: 4/5/2023  Next appt:  10/06/2023  [facility]

## 2023-07-05 RX ORDER — ERGOCALCIFEROL 1.25 MG/1
50000 CAPSULE ORAL WEEKLY
Qty: 12 CAPSULE | Refills: 0 | Status: SHIPPED | OUTPATIENT
Start: 2023-07-05

## 2024-01-31 DIAGNOSIS — F32.A ANXIETY AND DEPRESSION: ICD-10-CM

## 2024-01-31 DIAGNOSIS — F41.9 ANXIETY AND DEPRESSION: ICD-10-CM

## 2024-01-31 RX ORDER — DULOXETIN HYDROCHLORIDE 60 MG/1
60 CAPSULE, DELAYED RELEASE ORAL DAILY
Qty: 30 CAPSULE | Refills: 5 | Status: SHIPPED | OUTPATIENT
Start: 2024-01-31

## 2024-01-31 NOTE — TELEPHONE ENCOUNTER
Patient requesting refill(s) of: Cymbalta    Last filled: 6/28/23  Last appt: 4/5/23  Next appt: None  Pharmacy: [facility]

## 2024-09-13 ENCOUNTER — TELEPHONE (OUTPATIENT)
Age: 29
End: 2024-09-13

## 2024-10-22 ENCOUNTER — OFFICE VISIT (OUTPATIENT)
Dept: FAMILY MEDICINE CLINIC | Facility: CLINIC | Age: 29
End: 2024-10-22

## 2024-10-22 VITALS
SYSTOLIC BLOOD PRESSURE: 124 MMHG | HEART RATE: 106 BPM | WEIGHT: 255.4 LBS | DIASTOLIC BLOOD PRESSURE: 82 MMHG | TEMPERATURE: 98.4 F | OXYGEN SATURATION: 98 % | RESPIRATION RATE: 18 BRPM | BODY MASS INDEX: 39.41 KG/M2

## 2024-10-22 DIAGNOSIS — I10 ESSENTIAL HYPERTENSION: Primary | ICD-10-CM

## 2024-10-22 DIAGNOSIS — F39 MOOD DISORDER (HCC): ICD-10-CM

## 2024-10-22 DIAGNOSIS — F41.9 ANXIETY AND DEPRESSION: ICD-10-CM

## 2024-10-22 DIAGNOSIS — Z86.018 HISTORY OF BENIGN SCHWANNOMA: ICD-10-CM

## 2024-10-22 DIAGNOSIS — G47.30 SLEEP APNEA, UNSPECIFIED TYPE: ICD-10-CM

## 2024-10-22 DIAGNOSIS — F32.A ANXIETY AND DEPRESSION: ICD-10-CM

## 2024-10-22 PROCEDURE — 99214 OFFICE O/P EST MOD 30 MIN: CPT | Performed by: FAMILY MEDICINE

## 2024-10-22 RX ORDER — LORAZEPAM 0.5 MG/1
0.5 TABLET ORAL 2 TIMES DAILY PRN
Qty: 30 TABLET | Refills: 2 | Status: SHIPPED | OUTPATIENT
Start: 2024-10-22

## 2024-10-22 RX ORDER — LABETALOL 100 MG/1
100 TABLET, FILM COATED ORAL 2 TIMES DAILY
Qty: 200 TABLET | Refills: 1 | Status: SHIPPED | OUTPATIENT
Start: 2024-10-22

## 2024-10-22 RX ORDER — LAMOTRIGINE 100 MG/1
100 TABLET ORAL 2 TIMES DAILY
Qty: 200 TABLET | Refills: 1 | Status: SHIPPED | OUTPATIENT
Start: 2024-10-22

## 2024-10-22 RX ORDER — DULOXETIN HYDROCHLORIDE 60 MG/1
60 CAPSULE, DELAYED RELEASE ORAL DAILY
Qty: 100 CAPSULE | Refills: 1 | Status: SHIPPED | OUTPATIENT
Start: 2024-10-22

## 2024-10-22 NOTE — ASSESSMENT & PLAN NOTE
"History of benign schwannoma left neck excision 2018 by Sabino Andrew and Dr. Carmona.  Patient was supposed to proceed with MRI soft tissue for surveillance.  Most recent MRI October 2020:  \"Stable MR appearance of the soft tissues of the neck. Surgical changes in the left C6-C7 foramen are stable, no definite tumor recurrence. Enhancement as described may be postoperative. \"  "

## 2024-10-22 NOTE — ASSESSMENT & PLAN NOTE
Labetalol 100 mg daily -inconsistent  Bps were elevated summer time  Restart Eraxis 100 mg twice a day.  Monitor blood pressures at home.  Patient will monitor at home and will update me via [employer].    Orders:    labetalol (NORMODYNE) 100 mg tablet; Take 1 tablet (100 mg total) by mouth 2 (two) times a day
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
## 2024-10-22 NOTE — ASSESSMENT & PLAN NOTE
Currently not treated.  Patient will proceed once she has medical insurance  Patient uses armodafinil for symptoms of daytime fatigue/narcolepsy

## 2024-10-22 NOTE — PATIENT INSTRUCTIONS
New order for MRI placed    If and when you are ready to proceed with sleep evaluation- please message me

## 2024-10-25 PROBLEM — F32.A ANXIETY AND DEPRESSION: Status: ACTIVE | Noted: 2018-01-19

## 2024-10-25 PROBLEM — F41.9 ANXIETY AND DEPRESSION: Status: ACTIVE | Noted: 2018-01-19

## 2024-10-25 PROBLEM — F32.A DEPRESSION: Status: RESOLVED | Noted: 2018-01-17 | Resolved: 2024-10-25

## 2024-10-25 NOTE — ASSESSMENT & PLAN NOTE
Patient is doing well on regimen of Lamictal and duloxetine.  She uses lorazepam infrequently, medications refilled.  Symptoms are stably controlled    Orders:    DULoxetine (CYMBALTA) 60 mg delayed release capsule; Take 1 capsule (60 mg total) by mouth daily    LORazepam (ATIVAN) 0.5 mg tablet; Take 1 tablet (0.5 mg total) by mouth 2 (two) times a day as needed for anxiety

## 2024-10-29 RX ORDER — ARMODAFINIL 250 MG/1
250 TABLET ORAL AS NEEDED
Refills: 0 | OUTPATIENT
Start: 2024-10-29

## 2024-10-30 NOTE — TELEPHONE ENCOUNTER
Patient aware of provider instructions , patient stated that she will call pharmacy to find out, she never recipe any medication.

## 2024-10-30 NOTE — TELEPHONE ENCOUNTER
This prescription was just refilled by patient's previous PCP on October 18, 2024.  It is a controlled substance and she is requesting refill too soon.  Thank you

## 2025-01-02 ENCOUNTER — PATIENT MESSAGE (OUTPATIENT)
Dept: FAMILY MEDICINE CLINIC | Facility: CLINIC | Age: 30
End: 2025-01-02

## 2025-01-06 ENCOUNTER — OFFICE VISIT (OUTPATIENT)
Dept: FAMILY MEDICINE CLINIC | Facility: CLINIC | Age: 30
End: 2025-01-06

## 2025-01-06 VITALS
OXYGEN SATURATION: 96 % | DIASTOLIC BLOOD PRESSURE: 82 MMHG | BODY MASS INDEX: 39.19 KG/M2 | HEART RATE: 90 BPM | RESPIRATION RATE: 18 BRPM | WEIGHT: 254 LBS | SYSTOLIC BLOOD PRESSURE: 124 MMHG

## 2025-01-06 DIAGNOSIS — F32.A ANXIETY AND DEPRESSION: Primary | ICD-10-CM

## 2025-01-06 DIAGNOSIS — F39 MOOD DISORDER (HCC): ICD-10-CM

## 2025-01-06 DIAGNOSIS — F41.9 ANXIETY AND DEPRESSION: Primary | ICD-10-CM

## 2025-01-06 PROCEDURE — 99213 OFFICE O/P EST LOW 20 MIN: CPT | Performed by: FAMILY MEDICINE

## 2025-01-06 RX ORDER — DULOXETIN HYDROCHLORIDE 30 MG/1
30 CAPSULE, DELAYED RELEASE ORAL DAILY
Qty: 30 CAPSULE | Refills: 1 | Status: SHIPPED | OUTPATIENT
Start: 2025-01-06

## 2025-01-06 NOTE — PROGRESS NOTES
Name: Ya Ramirez      : 1995      MRN: [id number]  Encounter Provider: Kath Jang MD  Encounter Date: 2025   Encounter department: [facility]    Assessment & Plan  Anxiety and depression  Patient presents for evaluation of depression.  She reports worsening of symptoms within past few weeks, possibly longer.  Denies any suicidal ideation or homicidal ideation/plan.  Reports occasional passive thoughts of worthlessness, lack of motivation and drive, increased irritability, crying quite a bit.  I recommend to increase dose of duloxetine from 60 to 90 mg daily.  Patient will continue on current dose of Lamictal.  Patient recalls successful trials of Wellbutrin in the past.  I have asked her to contact me with an update in 2 to 3 weeks.  If her symptoms are not improving on duloxetine-I will consider addition of Wellbutrin.  We discussed importance of regular exercise.  Referral for counseling.    Orders:  •  DULoxetine (CYMBALTA) 30 mg delayed release capsule; Take 1 capsule (30 mg total) by mouth daily    Mood disorder (HCC)  Continue Lamictal 100 mg twice daily          Patient Instructions   Dose of duloxetine/Cymbalta will be increased from 60 to 90 mg daily  Please start counseling  Please message me in 2 to 3 weeks.  If higher dose of Cymbalta is not quite effective we will consider additional Wellbutrin  EXERCISE    History of Present Illness     Patient presents to discuss symptoms of depression.  She has been feeling worse for the past 2 to 3 weeks.  She reports lack of motivation and drive.  Simply not happy.  Feels irritable, crying a lot.  Sadness with no obvious reason.  Trouble falling asleep but otherwise sleeps well.  No excessive lethargy.  Occasional symptoms of worthlessness but no active suicidal homicidal ideation or plan.  No known triggers.  No unusual stressors.  Patient states that her family and friends believe that symptoms have been lasting  longer than 2 to 3 weeks.  Patient kassandra on regimen of Lamictal for mood disorder and is on duloxetine 60 mg daily for depression.  She has been compliant with Rx.  The patient is not in counseling      Review of Systems   Constitutional:  Positive for fatigue.   Respiratory: Negative.     Cardiovascular: Negative.    Psychiatric/Behavioral:  Positive for dysphoric mood. Negative for sleep disturbance.      Past Medical History:   Diagnosis Date   • Allergic     Amoxicillin/Penicillin   • Anxiety    • Depression    • GERD (gastroesophageal reflux disease)    • Headache(784.0)    • Hemangioma of lip     at six months old with chemo, surgery and tracheostomy   • Hypertension    • New onset of headaches    • Obesity    • Sexually transmitted disease (STD)      Past Surgical History:   Procedure Laterality Date   • FACIAL/NECK BIOPSY Left 2/22/2018    Procedure: excision of left neck neurogenic tumor;  Surgeon: Tamir Andrew MD;  Location: [facility];  Service: Surgical Oncology   • NECK SURGERY     • SURGERY OF LIP     • TONSILLECTOMY AND ADENOIDECTOMY     • TRACHEOSTOMY       Family History   Problem Relation Age of Onset   • Hemochromatosis Father    • Alcohol abuse Father    • Hypertension Father    • Asthma Father    • Lung cancer Maternal Grandfather    • Bone cancer Paternal Grandmother    • Coronary artery disease Paternal Grandmother    • COPD Paternal Grandmother    • Cancer Paternal Grandmother         bone   • Lung cancer Paternal Grandfather    • Cancer Paternal Grandfather    • Depression Mother    • Hypertension Mother    • Arthritis Mother    • Anxiety disorder Mother    • Dementia Maternal Grandmother    • Heart disease Paternal Uncle    • Diabetes Paternal Uncle    • Heart disease Paternal Aunt    • Diabetes Paternal Aunt    • Asthma Paternal Aunt    • Diabetes Paternal Aunt      Social History     Tobacco Use   • Smoking status: Some Days     Current packs/day: 0.00     Types: Cigarettes     Last  "attempt to quit: 2019     Years since quittin.0   • Smokeless tobacco: Never   Vaping Use   • Vaping status: Never Used   Substance and Sexual Activity   • Alcohol use: Yes     Alcohol/week: 1.0 standard drink of alcohol     Types: 1 Standard drinks or equivalent per week     Comment: occasional   • Drug use: Yes     Types: Marijuana     Comment: I have my medical card   • Sexual activity: Yes     Partners: Male     Birth control/protection: I.U.D.     Current Outpatient Medications on File Prior to Visit   Medication Sig   • Armodafinil 250 MG tablet Take 250 mg by mouth as needed   • DULoxetine (CYMBALTA) 60 mg delayed release capsule Take 1 capsule (60 mg total) by mouth daily   • labetalol (NORMODYNE) 100 mg tablet Take 1 tablet (100 mg total) by mouth 2 (two) times a day   • lamoTRIgine (LaMICtal) 100 mg tablet Take 1 tablet (100 mg total) by mouth 2 (two) times a day   • loratadine (CLARITIN) 10 mg tablet Take 1 tablet by mouth daily as needed   • LORazepam (ATIVAN) 0.5 mg tablet Take 1 tablet (0.5 mg total) by mouth 2 (two) times a day as needed for anxiety   • valACYclovir (VALTREX) 500 mg tablet Take 1 tablet (500 mg total) by mouth daily     Allergies   Allergen Reactions   • Amoxicillin Rash     Patient stated she, \"broke out all over her body.\"     Immunization History   Administered Date(s) Administered   • COVID-19 PFIZER VACCINE 0.3 ML IM 2021, 2021   • DTaP 1996, 1996, 1996, 1997, 2000   • DTaP 5 1996, 1996, 1996, 1997, 2000   • HPV Quadrivalent 2007, 2007, 2007, 02/15/2016   • Hep B, Adolescent or Pediatric 1996, 1996, 1996   • Hep B, adult 1996, 1996, 1996   • HiB 1996, 1996, 1996, 1997   • Hib (PRP-OMP) 1996, 1996, 1996, 1997   • INFLUENZA 2004, 2007, 2010   • IPV 1996, 1996, 1997, " 02/09/2000   • Influenza Quadrivalent Preservative Free 3 years and older IM 10/12/2017   • Influenza, injectable, quadrivalent, preservative free 0.5 mL 10/12/2018   • Influenza, seasonal, injectable 10/28/2014, 10/15/2016   • Influenza, seasonal, injectable, preservative free 02/15/2016   • MMR 08/19/1997, 02/09/2000   • Meningococcal MCV4P 05/11/2007   • Meningococcal, Unknown Serogroups 05/11/2007   • Pneumococcal Conjugate 13-Valent 11/01/2000   • Tdap 02/15/2016   • Tuberculin Skin Test-PPD Intradermal 02/05/2016, 02/15/2016   • Varicella 08/19/1997, 06/20/2008     Objective   /82   Pulse 90   Resp 18   Wt 115 kg (254 lb)   SpO2 96%   BMI 39.19 kg/m²     Physical Exam  Vitals and nursing note reviewed.   Constitutional:       General: She is not in acute distress.     Appearance: Normal appearance. She is not ill-appearing.   Neurological:      General: No focal deficit present.      Mental Status: She is alert and oriented to person, place, and time.   Psychiatric:         Attention and Perception: Attention normal.         Mood and Affect: Mood normal.         Speech: Speech normal.         Behavior: Behavior normal. Behavior is cooperative.         Thought Content: Thought content normal.         Cognition and Memory: Cognition normal.         Judgment: Judgment normal.

## 2025-01-06 NOTE — PATIENT INSTRUCTIONS
Dose of duloxetine/Cymbalta will be increased from 60 to 90 mg daily  Please start counseling  Please message me in 2 to 3 weeks.  If higher dose of Cymbalta is not quite effective we will consider additional Wellbutrin  EXERCISE

## 2025-01-09 PROBLEM — F39 MOOD DISORDER (HCC): Status: ACTIVE | Noted: 2025-01-09

## 2025-01-10 NOTE — ASSESSMENT & PLAN NOTE
Patient presents for evaluation of depression.  She reports worsening of symptoms within past few weeks, possibly longer.  Denies any suicidal ideation or homicidal ideation/plan.  Reports occasional passive thoughts of worthlessness, lack of motivation and drive, increased irritability, crying quite a bit.  I recommend to increase dose of duloxetine from 60 to 90 mg daily.  Patient will continue on current dose of Lamictal.  Patient recalls successful trials of Wellbutrin in the past.  I have asked her to contact me with an update in 2 to 3 weeks.  If her symptoms are not improving on duloxetine-I will consider addition of Wellbutrin.  We discussed importance of regular exercise.  Referral for counseling.    Orders:  •  DULoxetine (CYMBALTA) 30 mg delayed release capsule; Take 1 capsule (30 mg total) by mouth daily

## 2025-01-21 DIAGNOSIS — F41.9 ANXIETY AND DEPRESSION: Primary | ICD-10-CM

## 2025-01-21 DIAGNOSIS — F32.A ANXIETY AND DEPRESSION: Primary | ICD-10-CM

## 2025-01-21 RX ORDER — BUPROPION HYDROCHLORIDE 150 MG/1
150 TABLET ORAL DAILY
Qty: 30 TABLET | Refills: 2 | Status: SHIPPED | OUTPATIENT
Start: 2025-01-21

## 2025-04-16 DIAGNOSIS — I10 ESSENTIAL HYPERTENSION: ICD-10-CM

## 2025-04-16 DIAGNOSIS — F32.A ANXIETY AND DEPRESSION: ICD-10-CM

## 2025-04-16 DIAGNOSIS — F41.9 ANXIETY AND DEPRESSION: ICD-10-CM

## 2025-04-16 DIAGNOSIS — F39 MOOD DISORDER (HCC): ICD-10-CM

## 2025-04-17 DIAGNOSIS — F41.9 ANXIETY AND DEPRESSION: ICD-10-CM

## 2025-04-17 DIAGNOSIS — F32.A ANXIETY AND DEPRESSION: ICD-10-CM

## 2025-04-17 RX ORDER — BUPROPION HYDROCHLORIDE 150 MG/1
150 TABLET ORAL DAILY
Qty: 30 TABLET | Refills: 5 | Status: SHIPPED | OUTPATIENT
Start: 2025-04-17

## 2025-04-17 RX ORDER — LABETALOL 100 MG/1
100 TABLET, FILM COATED ORAL 2 TIMES DAILY
Qty: 200 TABLET | Refills: 1 | Status: SHIPPED | OUTPATIENT
Start: 2025-04-17

## 2025-04-17 RX ORDER — DULOXETIN HYDROCHLORIDE 60 MG/1
60 CAPSULE, DELAYED RELEASE ORAL DAILY
Qty: 100 CAPSULE | Refills: 1 | Status: SHIPPED | OUTPATIENT
Start: 2025-04-17

## 2025-04-17 RX ORDER — LAMOTRIGINE 100 MG/1
100 TABLET ORAL 2 TIMES DAILY
Qty: 60 TABLET | Refills: 0 | Status: SHIPPED | OUTPATIENT
Start: 2025-04-17

## 2025-04-17 RX ORDER — BUPROPION HYDROCHLORIDE 150 MG/1
150 TABLET ORAL DAILY
Qty: 30 TABLET | Refills: 2 | OUTPATIENT
Start: 2025-04-17

## 2025-06-24 DIAGNOSIS — F39 MOOD DISORDER (HCC): ICD-10-CM

## 2025-06-25 ENCOUNTER — OFFICE VISIT (OUTPATIENT)
Dept: FAMILY MEDICINE CLINIC | Facility: CLINIC | Age: 30
End: 2025-06-25

## 2025-06-25 VITALS
HEIGHT: 68 IN | SYSTOLIC BLOOD PRESSURE: 128 MMHG | DIASTOLIC BLOOD PRESSURE: 86 MMHG | HEART RATE: 76 BPM | WEIGHT: 242.2 LBS | TEMPERATURE: 98.2 F | RESPIRATION RATE: 16 BRPM | BODY MASS INDEX: 36.71 KG/M2 | OXYGEN SATURATION: 97 %

## 2025-06-25 DIAGNOSIS — F32.A ANXIETY AND DEPRESSION: ICD-10-CM

## 2025-06-25 DIAGNOSIS — Z30.011 ENCOUNTER FOR INITIAL PRESCRIPTION OF CONTRACEPTIVE PILLS: Primary | ICD-10-CM

## 2025-06-25 DIAGNOSIS — F39 MOOD DISORDER (HCC): ICD-10-CM

## 2025-06-25 DIAGNOSIS — F41.9 ANXIETY AND DEPRESSION: ICD-10-CM

## 2025-06-25 PROCEDURE — 99213 OFFICE O/P EST LOW 20 MIN: CPT | Performed by: FAMILY MEDICINE

## 2025-06-25 RX ORDER — NORETHINDRONE ACETATE AND ETHINYL ESTRADIOL 1MG-20(21)
1 KIT ORAL DAILY
Qty: 84 TABLET | Refills: 3 | Status: SHIPPED | OUTPATIENT
Start: 2025-06-25

## 2025-06-25 RX ORDER — LAMOTRIGINE 100 MG/1
100 TABLET ORAL 2 TIMES DAILY
Qty: 60 TABLET | Refills: 0 | OUTPATIENT
Start: 2025-06-25

## 2025-06-25 RX ORDER — BUPROPION HYDROCHLORIDE 150 MG/1
150 TABLET ORAL DAILY
Qty: 90 TABLET | Refills: 3 | Status: SHIPPED | OUTPATIENT
Start: 2025-06-25

## 2025-06-25 RX ORDER — LAMOTRIGINE 100 MG/1
100 TABLET ORAL 2 TIMES DAILY
Qty: 200 TABLET | Refills: 3 | Status: SHIPPED | OUTPATIENT
Start: 2025-06-25

## 2025-06-25 NOTE — PATIENT INSTRUCTIONS
Start Loestrin  If any side effects on birth control pills-please let me know  Specifically please watch for headaches  Please schedule follow-up with GYN/Kay Hardy

## 2025-06-29 PROBLEM — Z30.011 ENCOUNTER FOR INITIAL PRESCRIPTION OF CONTRACEPTIVE PILLS: Status: ACTIVE | Noted: 2025-06-29

## 2025-06-29 NOTE — ASSESSMENT & PLAN NOTE
Depression Screening Follow-up Plan: Patient's depression screening was positive with a PHQ-9 score of 8. Patient with underlying depression and was advised to continue current medications as prescribed. Patient advised to follow-up with PCP for further management.  Symptoms of anxiety and depression have been under good control on regimen of Wellbutrin  mg daily, duloxetine 60 mg daily, patient kassandra on Lamictal 100 mg twice daily  Orders:  •  buPROPion (Wellbutrin XL) 150 mg 24 hr tablet; Take 1 tablet (150 mg total) by mouth daily

## 2025-06-29 NOTE — PROGRESS NOTES
Name: Ya Ramirez      : 1995      MRN: [id number]  Encounter Provider: Kath Jang MD  Encounter Date: 2025   Encounter department: [facility]    Assessment & Plan  Anxiety and depression  Depression Screening Follow-up Plan: Patient's depression screening was positive with a PHQ-9 score of 8. Patient with underlying depression and was advised to continue current medications as prescribed. Patient advised to follow-up with PCP for further management.  Symptoms of anxiety and depression have been under good control on regimen of Wellbutrin  mg daily, duloxetine 60 mg daily, patient kassandra on Lamictal 100 mg twice daily  Orders:  •  buPROPion (Wellbutrin XL) 150 mg 24 hr tablet; Take 1 tablet (150 mg total) by mouth daily    Mood disorder (HCC)    Orders:  •  lamoTRIgine (LaMICtal) 100 mg tablet; Take 1 tablet (100 mg total) by mouth 2 (two) times a day    Encounter for initial prescription of contraceptive pills  Restart Loestrin. Follow up with GYN  Orders:  •  norethindrone-ethinyl estradiol (Loestrin Fe ) 1-20 MG-MCG per tablet; Take 1 tablet by mouth daily       Patient Instructions   Start Loestrin  If any side effects on birth control pills-please let me know  Specifically please watch for headaches  Please schedule follow-up with GYN/Kay Hardy    History of Present Illness     Patient presents for follow-up of anxiety/depression.  Symptoms have been well-controlled on regimen of Wellbutrin, duloxetine and Lamictal.  Blood pressure is well-controlled on labetalol.  Patient is requesting refills for her regular medications.  She started exercising on trampoline, lost weight.  Feels enthusiastic about continuation of healthy diet and lifestyle.    Patient is requesting to go back on BCP's.  She has previously used Loestrin with good results and no side effects.  Patient is not a daily smoker, reports to infrequent social smoking which she is willing to  "give up.    Last Pap smear October 2022.No menstrual concerns.      Review of Systems   Constitutional: Negative.    Respiratory: Negative.     Cardiovascular: Negative.    Gastrointestinal: Negative.    Genitourinary: Negative.    Psychiatric/Behavioral: Negative.          As per HPI     Past Medical History[1]  Past Surgical History[2]  Family History[3]  Social History[4]  Medications[5]  Allergies   Allergen Reactions   • Amoxicillin Rash     Patient stated she, \"broke out all over her body.\"     Immunization History   Administered Date(s) Administered   • COVID-19 PFIZER VACCINE 0.3 ML IM 08/31/2021, 09/21/2021   • DTaP 1995, 1996, 1996, 08/19/1997, 02/09/2000   • DTaP 5 1996, 1996, 1996, 08/19/1997, 02/09/2000   • HPV Quadrivalent 05/11/2007, 08/08/2007, 11/29/2007, 02/15/2016   • Hep B, Adolescent or Pediatric 1996, 1996, 1996   • Hep B, adult 1996, 1996, 1996   • HiB 1996, 1996, 1996, 08/19/1997   • Hib (PRP-OMP) 1996, 1996, 1996, 08/19/1997   • INFLUENZA 11/23/2004, 11/29/2007, 11/01/2010   • IPV 1996, 1996, 08/19/1997, 02/09/2000   • Influenza Quadrivalent Preservative Free 3 years and older IM 10/12/2017   • Influenza, injectable, quadrivalent, preservative free 0.5 mL 10/12/2018   • Influenza, seasonal, injectable 10/28/2014, 10/15/2016   • Influenza, seasonal, injectable, preservative free 02/15/2016   • MMR 08/19/1997, 02/09/2000   • Meningococcal MCV4, Unspecified 05/11/2007   • Meningococcal MCV4P 05/11/2007   • Meningococcal, Unknown Serogroups 05/11/2007   • Pneumococcal Conjugate 13-Valent 11/01/2000   • Tdap 02/15/2016   • Tuberculin Skin Test-PPD Intradermal 02/05/2016, 02/15/2016   • Varicella 08/19/1997, 06/20/2008     Objective   /86 (BP Location: Left arm, Patient Position: Sitting, Cuff Size: Large)   Pulse 76   Temp 98.2 °F (36.8 °C) (Temporal)   Resp 16   Ht 5' " "7.5\" (1.715 m)   Wt 110 kg (242 lb 3.2 oz)   LMP 05/31/2025 (Exact Date)   SpO2 97%   BMI 37.37 kg/m²     Physical Exam  Vitals and nursing note reviewed.   Constitutional:       Appearance: Normal appearance.     Neurological:      General: No focal deficit present.      Mental Status: She is alert and oriented to person, place, and time.                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;  Surgeon: Tamir Andrew MD;  Location: [facility];  Service: Surgical Oncology   • NECK SURGERY     • SURGERY OF LIP     • TONSILLECTOMY AND ADENOIDECTOMY     • TRACHEOSTOMY     [3]  Family History  Problem Relation Name Age of Onset   • Hemochromatosis Father Tank    • Alcohol abuse Father Takn    • Hypertension Father Tank    • Asthma Father Tank    • Lung cancer Maternal Grandfather     • Bone cancer Paternal Grandmother Anamaria    • Coronary artery disease Paternal Grandmother Anamaria    • COPD Paternal Grandmother Anamaria    • Cancer Paternal Grandmother Anamaria         bone   • Lung cancer Paternal Grandfather Domenic    • Cancer Paternal Grandfather Domenic    • Depression Mother Ruth    • Hypertension Mother Ruth    • Arthritis Mother Ruth    • Anxiety disorder Mother Ruth    • Dementia Maternal Grandmother Anamaria    • Heart disease Paternal Uncle Wit    • Diabetes Paternal Uncle Wit    • Heart disease Paternal Aunt Malissa    • Diabetes Paternal Aunt Malissa    • Asthma Paternal Aunt Malissa    • Diabetes Paternal Aunt Peg    [4]  Social " History  Tobacco Use   • Smoking status: Some Days     Current packs/day: 0.00     Types: Cigarettes     Last attempt to quit: 2019     Years since quittin.4   • Smokeless tobacco: Never   Vaping Use   • Vaping status: Never Used   Substance and Sexual Activity   • Alcohol use: Yes     Alcohol/week: 1.0 standard drink of alcohol     Types: 1 Standard drinks or equivalent per week     Comment: occasional   • Drug use: Yes     Types: Marijuana     Comment: I have my medical card   • Sexual activity: Yes     Partners: Male     Birth control/protection: I.U.D.   [5]  Current Outpatient Medications on File Prior to Visit   Medication Sig   • DULoxetine (CYMBALTA) 60 mg delayed release capsule TAKE ONE CAPSULE BY MOUTH EVERY DAY   • labetalol (NORMODYNE) 100 mg tablet TAKE ONE TABLET BY MOUTH TWICE A DAY   • loratadine (CLARITIN) 10 mg tablet Take 1 tablet by mouth daily as needed   • LORazepam (ATIVAN) 0.5 mg tablet Take 1 tablet (0.5 mg total) by mouth 2 (two) times a day as needed for anxiety   • valACYclovir (VALTREX) 500 mg tablet Take 1 tablet (500 mg total) by mouth daily

## 2025-06-29 NOTE — ASSESSMENT & PLAN NOTE
Orders:  •  lamoTRIgine (LaMICtal) 100 mg tablet; Take 1 tablet (100 mg total) by mouth 2 (two) times a day

## 2025-06-29 NOTE — ASSESSMENT & PLAN NOTE
Restart Loestrin. Follow up with GYN  Orders:  •  norethindrone-ethinyl estradiol (Loestrin Fe 1/20) 1-20 MG-MCG per tablet; Take 1 tablet by mouth daily

## (undated) DEVICE — CUSA® EXCEL 36 KHZ CEM™ NOSECONE: Brand: CUSA® EXCEL

## (undated) DEVICE — SUT PROLENE 3-0 SH 36 IN 8522H

## (undated) DEVICE — NEURO PATTIES 1/2 X 1 1/2

## (undated) DEVICE — SUT MONOCRYL 4-0 PS-2 27 IN Y426H

## (undated) DEVICE — MEDI-VAC YANK SUCT HNDL W/TPRD BULBOUS TIP: Brand: CARDINAL HEALTH

## (undated) DEVICE — LIGACLIP MCA MULTIPLE CLIP APPLIERS, 20 SMALL CLIPS: Brand: LIGACLIP

## (undated) DEVICE — GLOVE INDICATOR PI UNDERGLOVE SZ 8 BLUE

## (undated) DEVICE — 3M™ STERI-STRIP™ REINFORCED ADHESIVE SKIN CLOSURES, R1547, 1/2 IN X 4 IN (12 MM X 100 MM), 6 STRIPS/ENVELOPE: Brand: 3M™ STERI-STRIP™

## (undated) DEVICE — DRAPE SURGIKIT SADDLE BAG

## (undated) DEVICE — COTTON BALLS: Brand: DEROYAL

## (undated) DEVICE — PLUMEPEN PRO 10FT

## (undated) DEVICE — ADHESIVE SKN CLSR HISTOACRYL FLEX 0.5ML LF

## (undated) DEVICE — BULB SYRINGE,IRRIGATION WITH PROTECTIVE CAP: Brand: DOVER

## (undated) DEVICE — SUT VICRYL 2-0 D-SPECIAL 27 IN D8114

## (undated) DEVICE — VESSEL LOOPS X-RAY DETECTABLE: Brand: DEROYAL

## (undated) DEVICE — CUSA® EXCEL 36KHZ 1.57MM MICROTIP™ CURVED EXTENDED TIP: Brand: CUSA® EXCEL

## (undated) DEVICE — SUT VICRYL 3-0 SH 27 IN J416H

## (undated) DEVICE — CHLORAPREP HI-LITE 26ML ORANGE

## (undated) DEVICE — TIBURON SPLIT SHEET: Brand: CONVERTORS

## (undated) DEVICE — SUT VICRYL 3-0 18 IN J110T

## (undated) DEVICE — GLOVE SRG BIOGEL ECLIPSE 8

## (undated) DEVICE — SUT ETHILON 3-0 FS-1 18 IN 663G

## (undated) DEVICE — NEURO PATTIES 1/2 X 1/2

## (undated) DEVICE — SURGICEL 4 X 8

## (undated) DEVICE — 3000CC GUARDIAN II: Brand: GUARDIAN

## (undated) DEVICE — SURGIFOAM 8.5 X 12.5

## (undated) DEVICE — TRAY FOLEY 16FR URIMETER SURESTEP

## (undated) DEVICE — CUSA® EXCEL 36KHZ CUSA® MANIFOLD TUBING SET: Brand: CUSA® EXCEL

## (undated) DEVICE — SUT VICRYL 2-0 SH 27 IN UNDYED J417H

## (undated) DEVICE — SCD SEQUENTIAL COMPRESSION COMFORT SLEEVE MEDIUM KNEE LENGTH: Brand: KENDALL SCD

## (undated) DEVICE — PACK UNIVERSAL NECK

## (undated) DEVICE — SUT SILK 2-0 18 IN A185H

## (undated) DEVICE — SUT SILK 2-0 SH CR/8 18 IN C012D

## (undated) DEVICE — DISPOSABLE EQUIPMENT COVER: Brand: SMALL TOWEL DRAPE

## (undated) DEVICE — SUT SILK 3-0 18 IN A184H

## (undated) DEVICE — DRAPE SHEET THREE QUARTER

## (undated) DEVICE — Device

## (undated) DEVICE — INTENDED FOR TISSUE SEPARATION, AND OTHER PROCEDURES THAT REQUIRE A SHARP SURGICAL BLADE TO PUNCTURE OR CUT.: Brand: BARD-PARKER SAFETY BLADES SIZE 15, STERILE

## (undated) DEVICE — LIGACLIP MCA MULTIPLE CLIP APPLIERS, 20 MEDIUM CLIPS: Brand: LIGACLIP

## (undated) DEVICE — REM POLYHESIVE ADULT PATIENT RETURN ELECTRODE: Brand: VALLEYLAB